# Patient Record
Sex: MALE | Race: WHITE | NOT HISPANIC OR LATINO | Employment: FULL TIME | URBAN - METROPOLITAN AREA
[De-identification: names, ages, dates, MRNs, and addresses within clinical notes are randomized per-mention and may not be internally consistent; named-entity substitution may affect disease eponyms.]

---

## 2017-01-05 ENCOUNTER — LAB CONVERSION - ENCOUNTER (OUTPATIENT)
Dept: OTHER | Facility: OTHER | Age: 57
End: 2017-01-05

## 2017-01-05 ENCOUNTER — GENERIC CONVERSION - ENCOUNTER (OUTPATIENT)
Dept: OTHER | Facility: OTHER | Age: 57
End: 2017-01-05

## 2017-01-05 LAB
INR PPP: 2.4
PROTHROMBIN TIME: 25 SEC (ref 9–11.5)

## 2017-02-02 ENCOUNTER — LAB CONVERSION - ENCOUNTER (OUTPATIENT)
Dept: OTHER | Facility: OTHER | Age: 57
End: 2017-02-02

## 2017-02-02 ENCOUNTER — GENERIC CONVERSION - ENCOUNTER (OUTPATIENT)
Dept: OTHER | Facility: OTHER | Age: 57
End: 2017-02-02

## 2017-02-02 LAB
INR PPP: 2.3
PROTHROMBIN TIME: 23.4 SEC (ref 9–11.5)

## 2017-03-02 ENCOUNTER — GENERIC CONVERSION - ENCOUNTER (OUTPATIENT)
Dept: OTHER | Facility: OTHER | Age: 57
End: 2017-03-02

## 2017-03-02 ENCOUNTER — LAB CONVERSION - ENCOUNTER (OUTPATIENT)
Dept: OTHER | Facility: OTHER | Age: 57
End: 2017-03-02

## 2017-03-02 LAB
INR PPP: 2.1
PROTHROMBIN TIME: 21 SEC (ref 9–11.5)

## 2017-04-04 ENCOUNTER — GENERIC CONVERSION - ENCOUNTER (OUTPATIENT)
Dept: OTHER | Facility: OTHER | Age: 57
End: 2017-04-04

## 2017-04-04 ENCOUNTER — LAB CONVERSION - ENCOUNTER (OUTPATIENT)
Dept: OTHER | Facility: OTHER | Age: 57
End: 2017-04-04

## 2017-04-04 LAB
INR PPP: 2.3
PROTHROMBIN TIME: 23.7 SEC (ref 9–11.5)

## 2017-05-02 ENCOUNTER — LAB CONVERSION - ENCOUNTER (OUTPATIENT)
Dept: OTHER | Facility: OTHER | Age: 57
End: 2017-05-02

## 2017-05-02 ENCOUNTER — GENERIC CONVERSION - ENCOUNTER (OUTPATIENT)
Dept: OTHER | Facility: OTHER | Age: 57
End: 2017-05-02

## 2017-05-02 LAB
INR PPP: 1.9
PROTHROMBIN TIME: 19.5 SEC (ref 9–11.5)

## 2017-05-16 ENCOUNTER — GENERIC CONVERSION - ENCOUNTER (OUTPATIENT)
Dept: OTHER | Facility: OTHER | Age: 57
End: 2017-05-16

## 2017-06-02 ENCOUNTER — GENERIC CONVERSION - ENCOUNTER (OUTPATIENT)
Dept: OTHER | Facility: OTHER | Age: 57
End: 2017-06-02

## 2017-06-02 ENCOUNTER — LAB CONVERSION - ENCOUNTER (OUTPATIENT)
Dept: OTHER | Facility: OTHER | Age: 57
End: 2017-06-02

## 2017-06-02 LAB
INR PPP: 1.9
PROTHROMBIN TIME: 19.8 SEC (ref 9–11.5)

## 2017-06-16 ENCOUNTER — ALLSCRIPTS OFFICE VISIT (OUTPATIENT)
Dept: OTHER | Facility: OTHER | Age: 57
End: 2017-06-16

## 2017-06-30 ENCOUNTER — LAB CONVERSION - ENCOUNTER (OUTPATIENT)
Dept: OTHER | Facility: OTHER | Age: 57
End: 2017-06-30

## 2017-06-30 LAB
INR PPP: 2.5
PROTHROMBIN TIME: 25.4 SEC (ref 9–11.5)

## 2017-07-06 ENCOUNTER — GENERIC CONVERSION - ENCOUNTER (OUTPATIENT)
Dept: OTHER | Facility: OTHER | Age: 57
End: 2017-07-06

## 2017-07-28 ENCOUNTER — GENERIC CONVERSION - ENCOUNTER (OUTPATIENT)
Dept: OTHER | Facility: OTHER | Age: 57
End: 2017-07-28

## 2017-07-28 ENCOUNTER — LAB CONVERSION - ENCOUNTER (OUTPATIENT)
Dept: OTHER | Facility: OTHER | Age: 57
End: 2017-07-28

## 2017-07-28 LAB
INR PPP: 2.2
PROTHROMBIN TIME: 22.3 SEC (ref 9–11.5)

## 2017-08-24 ENCOUNTER — GENERIC CONVERSION - ENCOUNTER (OUTPATIENT)
Dept: OTHER | Facility: OTHER | Age: 57
End: 2017-08-24

## 2017-08-25 ENCOUNTER — LAB CONVERSION - ENCOUNTER (OUTPATIENT)
Dept: OTHER | Facility: OTHER | Age: 57
End: 2017-08-25

## 2017-08-25 ENCOUNTER — GENERIC CONVERSION - ENCOUNTER (OUTPATIENT)
Dept: OTHER | Facility: OTHER | Age: 57
End: 2017-08-25

## 2017-08-25 LAB
INR PPP: 2.5
PROTHROMBIN TIME: 25.4 SEC (ref 9–11.5)

## 2017-08-29 ENCOUNTER — GENERIC CONVERSION - ENCOUNTER (OUTPATIENT)
Dept: OTHER | Facility: OTHER | Age: 57
End: 2017-08-29

## 2017-09-26 ENCOUNTER — LAB CONVERSION - ENCOUNTER (OUTPATIENT)
Dept: OTHER | Facility: OTHER | Age: 57
End: 2017-09-26

## 2017-09-26 ENCOUNTER — GENERIC CONVERSION - ENCOUNTER (OUTPATIENT)
Dept: OTHER | Facility: OTHER | Age: 57
End: 2017-09-26

## 2017-09-26 LAB
INR PPP: 2.2
PROTHROMBIN TIME: 22.8 SEC (ref 9–11.5)

## 2017-10-16 ENCOUNTER — GENERIC CONVERSION - ENCOUNTER (OUTPATIENT)
Dept: OTHER | Facility: OTHER | Age: 57
End: 2017-10-16

## 2017-10-16 DIAGNOSIS — D68.51 ACTIVATED PROTEIN C RESISTANCE (HCC): ICD-10-CM

## 2017-10-27 ENCOUNTER — GENERIC CONVERSION - ENCOUNTER (OUTPATIENT)
Dept: OTHER | Facility: OTHER | Age: 57
End: 2017-10-27

## 2017-10-27 LAB
INR PPP: 2.2
PROTHROMBIN TIME: 22.4 SEC (ref 9–11.5)

## 2017-11-29 ENCOUNTER — ALLSCRIPTS OFFICE VISIT (OUTPATIENT)
Dept: OTHER | Facility: OTHER | Age: 57
End: 2017-11-29

## 2017-11-29 DIAGNOSIS — R07.9 CHEST PAIN: ICD-10-CM

## 2017-11-29 DIAGNOSIS — I10 ESSENTIAL (PRIMARY) HYPERTENSION: ICD-10-CM

## 2017-11-29 DIAGNOSIS — R00.2 PALPITATIONS: ICD-10-CM

## 2017-11-29 DIAGNOSIS — M10.9 GOUT: ICD-10-CM

## 2017-12-02 ENCOUNTER — LAB CONVERSION - ENCOUNTER (OUTPATIENT)
Dept: OTHER | Facility: OTHER | Age: 57
End: 2017-12-02

## 2017-12-02 LAB
A/G RATIO (HISTORICAL): 2.3 (CALC) (ref 1–2.5)
ALBUMIN SERPL BCP-MCNC: 4.5 G/DL (ref 3.6–5.1)
ALP SERPL-CCNC: 53 U/L (ref 40–115)
ALT SERPL W P-5'-P-CCNC: 39 U/L (ref 9–46)
AST SERPL W P-5'-P-CCNC: 33 U/L (ref 10–35)
BASOPHILS # BLD AUTO: 0.8 %
BASOPHILS # BLD AUTO: 58 CELLS/UL (ref 0–200)
BILIRUB SERPL-MCNC: 0.9 MG/DL (ref 0.2–1.2)
BUN SERPL-MCNC: 9 MG/DL (ref 7–25)
BUN/CREA RATIO (HISTORICAL): NORMAL (CALC) (ref 6–22)
CALCIUM SERPL-MCNC: 9.4 MG/DL (ref 8.6–10.3)
CHLORIDE SERPL-SCNC: 103 MMOL/L (ref 98–110)
CHOLEST SERPL-MCNC: 201 MG/DL
CHOLEST/HDLC SERPL: 2.8 (CALC)
CO2 SERPL-SCNC: 27 MMOL/L (ref 20–31)
CREAT SERPL-MCNC: 0.84 MG/DL (ref 0.7–1.33)
DEPRECATED RDW RBC AUTO: 13.1 % (ref 11–15)
EGFR AFRICAN AMERICAN (HISTORICAL): 114 ML/MIN/1.73M2
EGFR-AMERICAN CALC (HISTORICAL): 99 ML/MIN/1.73M2
EOSINOPHIL # BLD AUTO: 285 CELLS/UL (ref 15–500)
EOSINOPHIL # BLD AUTO: 3.9 %
GAMMA GLOBULIN (HISTORICAL): 2 G/DL (CALC) (ref 1.9–3.7)
GLUCOSE (HISTORICAL): 85 MG/DL (ref 65–99)
HBA1C MFR BLD HPLC: 4.9 % OF TOTAL HGB
HCT VFR BLD AUTO: 48.1 % (ref 38.5–50)
HDLC SERPL-MCNC: 72 MG/DL
HGB BLD-MCNC: 17.3 G/DL (ref 13.2–17.1)
LDL CHOLESTEROL (HISTORICAL): 112 MG/DL (CALC)
LYMPHOCYTES # BLD AUTO: 2285 CELLS/UL (ref 850–3900)
LYMPHOCYTES # BLD AUTO: 31.3 %
MCH RBC QN AUTO: 34.5 PG (ref 27–33)
MCHC RBC AUTO-ENTMCNC: 36 G/DL (ref 32–36)
MCV RBC AUTO: 95.8 FL (ref 80–100)
MONOCYTES # BLD AUTO: 526 CELLS/UL (ref 200–950)
MONOCYTES (HISTORICAL): 7.2 %
NEUTROPHILS # BLD AUTO: 4146 CELLS/UL (ref 1500–7800)
NEUTROPHILS # BLD AUTO: 56.8 %
NON-HDL-CHOL (CHOL-HDL) (HISTORICAL): 129 MG/DL (CALC)
PLATELET # BLD AUTO: 128 THOUSAND/UL (ref 140–400)
PMV BLD AUTO: 10.2 FL (ref 7.5–12.5)
POTASSIUM SERPL-SCNC: 4.3 MMOL/L (ref 3.5–5.3)
RBC # BLD AUTO: 5.02 MILLION/UL (ref 4.2–5.8)
SODIUM SERPL-SCNC: 140 MMOL/L (ref 135–146)
TOTAL PROTEIN (HISTORICAL): 6.5 G/DL (ref 6.1–8.1)
TRIGL SERPL-MCNC: 78 MG/DL
TSH SERPL DL<=0.05 MIU/L-ACNC: 1.77 MIU/L (ref 0.4–4.5)
URIC ACID (HISTORICAL): 3.3 MG/DL (ref 4–8)
WBC # BLD AUTO: 7.3 THOUSAND/UL (ref 3.8–10.8)

## 2017-12-05 NOTE — PROGRESS NOTES
Assessment    1  Never smoked cigarettes (V49 89) (Z78 9)   2  Palpitations (785 1) (R00 2)   3  Encounter for preventive health examination (V70 0) (Z00 00)   4  Chest pain (786 50) (R07 9)   5  Elevated hemoglobin (282 7) (D58 2)   6  Esophageal reflux (530 81) (K21 9)   7  Hypertension (401 9) (I10)   8  Gout (274 9) (M10 9)    Plan  Chest pain    · RaNITidine HCl - 300 MG Oral Capsule; 1 Cap daily   · * NM MYOCARDIAL PERFUSION SPECT (STRESS AND/OR REST); Status:Need Information - Financial  Authorization; Requested for:29Nov2017;    · EKG/ECG- POC; Status:Complete;   Done: 59MGQ2045 10:19AM  Chest pain, EKG, abnormal, Hypertension, Palpitations    · *Polysomnography, Sleep Study, Diagnostic; Status:Need Information - Financial Authorization; Requested for:29Nov2017;   there any other medical conditions or medications that would explain these      symptoms? : No  is the sleep disturbance affecting the patient's ability to function? : DAYTIME      HYPERSOMNOLENCE, CHEST PAIN, ELEVATED BP  History/Symptoms: : POOR SLEEP, PALPITATIONS, WAKING UP CP  Study Only or Consult : Sleep Study and Consult and F/U with Sleep Specialist  Chest pain, Gout    · (1) LIPID PANEL, FASTING; Status:Active; Requested for:29Nov2017;   Chest pain, Hypertension    · Metoprolol Succinate ER 50 MG Oral Tablet Extended Release 24 Hour; 1 PO BID  Chest pain, Palpitations    · ECHO COMPLETE WITH CONTRAST IF INDICATED; Status:Need Information - Financial Authorization; Requested for:29Nov2017;   Gout    · (1) CBC/PLT/DIFF; Status:Active; Requested for:29Nov2017;    · (1) COMPREHENSIVE METABOLIC PANEL; Status:Active; Requested for:29Nov2017;    · (1) HEMOGLOBIN A1C; Status:Active; Requested for:29Nov2017;    · (1) URIC ACID; Status:Active; Requested for:29Nov2017;   Gout, Hypertension, Palpitations    · (1) TSH; Status:Active;  Requested for:29Nov2017;     Discussion/Summary  Discussion Summary:   REFER TO CARDIOLOGY- PT HAS SEEN DR ANDERSON- IF APPT WON'T BE FORE MORE THAN A MONTH WILL REFER FOR NUCLEAR STRES TEST, HOLTER TO RULE OUT ARRHYTHMIA AND SLEEP STUDY TO RULE OUT A FIB; INCREASE BETA BLOCKER TO 50 ER BID; FOLLOW BP  CHECK HOLTER  CONTINUE ON WARFARIN FOR HYPERCOAG STATE  ADD RANITIDINE FOR ANY GERD SYMPTOMS   FOLLOW UP  EKG NORMAL  CHECK LABS  Counseling Documentation With Imm: The patient was counseled regarding diagnostic results, instructions for management, risk factor reductions, prognosis, patient and family education, impressions, risks and benefits of treatment options, importance of compliance with treatment  Chief Complaint    1  Chest Pain  Chief Complaint Free Text Note Form: patient here for chest pain, palpitations and having more issues      History of Present Illness  Palpitations: LAURA MALONEY presents with complaints of palpitations  Associated symptoms include a fluttering heartbeat, a pounding heartbeat and chest pain, but no dizziness, no dyspnea, no lightheadedness, no syncope and no weakness  HPI: PT AWAKES WITH PALPITATIONS, FEELING LIKE PRESSURE IN HIS CHEST - AWAKNES - HE HAS BEEN TAKING ATIVAN AND HE TAKES A BETA BLOCKER= HE IS MAINTAINED ON COUMADIN FOR HYPERCOAG STATE; HE HAS SEEN DR ANDERSON IN THE PAST AND WOULD LIKE TO SEE HIM AGAIN;       Chest Pain:   LAURA MALONEY presents with complaints of sudden onset of moderate anterior mid-chest chest pain, described as pressure-like, non-radiating  Associated symptoms include no cough, no dyspnea, no leg edema, no orthopnea, no palpitations, no anxiety, no dizziness, no fatigue, no heartburn, no leg pain, no leg swelling, no nausea, no sweating, no syncope, no vomiting, no weakness and no weight gain  Review of Systems  Complete-Male:   Constitutional: feeling poorly and feeling tired, but as noted in HPI  Eyes: No complaints of eye pain, no red eyes, no discharge from eyes, no itchy eyes, no eyesight problems and no purulent discharge from the eyes  ENT: no complaints of earache, no hearing loss, no nosebleeds, no nasal discharge, no sore throat, no hoarseness, no earache and no hearing loss  Cardiovascular: chest pain, fast heart rate and palpitations, but as noted in HPI and the heart rate was not slow  Respiratory: No complaints of shortness of breath, no wheezing, no cough, no SOB on exertion, no orthopnea or PND, no shortness of breath, no cough, no orthopnea, no wheezing, no shortness of breath during exertion and no PND  Gastrointestinal: as noted in HPI, no abdominal pain, no nausea, no vomiting, no diarrhea and no blood in stools  Genitourinary: No complaints of dysuria, no incontinence, no hesitancy, no nocturia, no genital lesion, no testicular pain, no dysuria, no urinary hesitancy, no incontinence and no nocturia  Musculoskeletal: No complaints of arthralgia, no myalgias, no joint swelling or stiffness, no limb pain or swelling and no arthralgias  Integumentary: No complaints of skin rash or skin lesions, no itching, no skin wound, no dry skin and no rashes  Neurological: No compliants of headache, no confusion, no convulsions, no numbness or tingling, no dizziness or fainting, no limb weakness, no difficulty walking  Psychiatric: Is not suicidal, no sleep disturbances, no anxiety or depression, no change in personality, no emotional problems  Hematologic/Lymphatic: No complaints of swollen glands, no swollen glands in the neck, does not bleed easily, no easy bruising  ROS Reviewed:   ROS reviewed  Active Problems    1  Abdominal pain (789 00) (R10 9)   2  Acute left lower quadrant pain (789 04,338 19) (R10 32)   3  Chest mass (786 6) (R22 2)   4  Chest pain (786 50) (R07 9)   5  Diverticulitis of colon (562 11) (K57 32)   6  Edema (782 3) (R60 9)   7  EKG, abnormal (794 31) (R94 31)   8  Elbow pain (719 42) (M25 529)   9  Elevated hemoglobin (282 7) (D58 2)   10  Esophageal reflux (530 81) (K21 9)   11   Factor V Leiden mutation (289 81) (D68 51)   12  Fatty liver (571 8) (K76 0)   13  Gout (274 9) (M10 9)   14  Hypertension (401 9) (I10)   15  Thrombocytopenia (287 5) (D69 6)   16  Umbilical hernia (125 9) (K42 9)   17  Ventral hernia (553 20) (K43 9)    Past Medical History    1  History of Abnormal Liver Function Test (790 6)   2  History of Foot Pain (Soft Tissue) (729 5)   3  History of acute gouty arthritis (V13 4) (Z87 39)   4  History of Polyp of sigmoid colon (211 3) (D12 5)  Active Problems And Past Medical History Reviewed: The active problems and past medical history were reviewed and updated today  Surgical History    1  History of Umbilical Hernia Repair  Surgical History Reviewed: The surgical history was reviewed and updated today  Family History  Mother    1  Family history of Breast Cancer (V16 3)   2  Family history of Hypertension (V17 49)   3  Family history of Lung cancer  Father    4  Family history of Coronary Artery Disease (V17 49)   5  Family history of Hypertension (V17 49)  Sister    10  Family history of Hypertension (V17 49)   7  Family history of Leukemia (V16 6)  Family History Reviewed: The family history was reviewed and updated today  Social History    · Moderate alcohol use   · Never smoked cigarettes (V49 89) (Z78 9)  Social History Reviewed: The social history was reviewed and updated today  The social history was reviewed and is unchanged  Current Meds   1  Allopurinol 300 MG Oral Tablet; TAKE 1 TABLET DAILY; Therapy: 03EON2634 to (Evaluate:72Ahm7367)  Requested for: 88Yrl2806; Last Rx:40Tmq0350   Ordered   2  Lisinopril 20 MG Oral Tablet; TAKE 1 TABLET DAILY  Requested for: 30RSP5922; Last MI:02NSO1303   Ordered   3  Warfarin Sodium 4 MG Oral Tablet; take one table 4  days per week; Therapy: 04DXU7226 to ([facility])  Requested for: 87YBA6794 Recorded   4  Warfarin Sodium 5 MG Oral Tablet; take one tablet 3 days per week(T,R,SAT);    Therapy: 74LAB4766 to ([phone])  Requested for: 09YSS5008 Recorded  Medication List Reviewed: The medication list was reviewed and updated today  Allergies    1  No Known Drug Allergies    Vitals  Vital Signs    Recorded: 17IID3666 10:08AM   Temperature 98 7 F, Tympanic   Heart Rate 76   Respiration 16   Systolic 715, LUE, Sitting   Diastolic 94, LUE, Sitting   Height 5 ft 8 5 in   Weight 196 lb 8 oz   BMI Calculated 29 44   BSA Calculated 2 04     Physical Exam    Constitutional   General appearance: No acute distress, well appearing and well nourished  WDWN MALE- ANXIOUS  Eyes   Conjunctiva and lids: No swelling, erythema, or discharge  Pupils and irises: Equal, round and reactive to light  Ears, Nose, Mouth, and Throat   Nasal mucosa, septum, and turbinates: Normal without edema or erythema  Oropharynx: Normal with no erythema, edema, exudate or lesions  Pulmonary   Respiratory effort: No increased work of breathing or signs of respiratory distress  Auscultation of lungs: Clear to auscultation, equal breath sounds bilaterally, no wheezes, no rales, no rhonci  Auscultation of the lungs revealed no expiratory wheezing, normal expiratory time and no inspiratory wheezing  no rales or crackles were heard bilaterally  no rhonchi  no friction rub  no wheezing  no diminished breath sounds  no bronchial breath sounds  Cardiovascular   Palpation of heart: Normal PMI, no thrills  RRR, SOME PAIN LEFT PECTORAL IS MUSCLE ON PALPATION  Auscultation of heart: Normal rate and rhythm, normal S1 and S2, without murmurs  The heart rate was normal  The rhythm was regular  Heart sounds: normal S1, normal S2, no S3 and no S4  no murmurs were heard  Abdomen   Abdomen: Non-tender, no masses  NORMAL BS; NO CVA TENDENRESS  Liver and spleen: No hepatomegaly or splenomegaly  NO ORGANOMEGALY  Lymphatic   Palpation of lymph nodes in neck: No lymphadenopathy      Musculoskeletal   Gait and station: Normal  Neurologic   Cranial nerves: Cranial nerves 2-12 intact  Reflexes: 2+ and symmetric  Sensation: No sensory loss  Psychiatric   Orientation to person, place and time: Normal     Mood and affect: Normal          Results/Data  EKG/ECG- POC [id number] 10:19AM Amber Lozada     Test Name Result Flag Reference   EKG/ECG 11/29/2017       PHQ-2 Adult Depression Screening 29Nov2017 10:14AM User, Ahs     Test Name Result Flag Reference   PHQ-2 Adult Depression Score 0     Over the last two weeks, how often have you been bothered by any of the following problems? Little interest or pleasure in doing things: Not at all - 0  Feeling down, depressed, or hopeless: Not at all - 0   PHQ-2 Adult Depression Screening Negative       Coumadin Flow Sheet 07ABQ5387 12:00AM      Test Name Result Flag Reference   INR 2 4     Recheck INR 86Xmf4801     Current Dose      5mgs tues and Thurs, 4mgs other 5 days   New Dose same dose     Patient Notified yes       ECG: A 12 lead ECG was performed and was normal  No acute ischemia  Rhythm and rate: normal sinus rhythm  P-waves: the P wave is normal    QRS: the QRS is normal       Health Management  History of Screen for colon cancer   COLONOSCOPY; every 5 years; Last 63PDN2881; Next Due: [id number]; Active    Signatures   Electronically signed by :  Darby Olea DO; Nov 30 2017  7:55AM EST                       (Author)

## 2017-12-06 ENCOUNTER — GENERIC CONVERSION - ENCOUNTER (OUTPATIENT)
Dept: OTHER | Facility: OTHER | Age: 57
End: 2017-12-06

## 2017-12-11 ENCOUNTER — GENERIC CONVERSION - ENCOUNTER (OUTPATIENT)
Dept: FAMILY MEDICINE CLINIC | Facility: CLINIC | Age: 57
End: 2017-12-11

## 2017-12-12 ENCOUNTER — GENERIC CONVERSION - ENCOUNTER (OUTPATIENT)
Dept: FAMILY MEDICINE CLINIC | Facility: CLINIC | Age: 57
End: 2017-12-12

## 2017-12-28 ENCOUNTER — GENERIC CONVERSION - ENCOUNTER (OUTPATIENT)
Dept: OTHER | Facility: OTHER | Age: 57
End: 2017-12-28

## 2018-01-04 ENCOUNTER — GENERIC CONVERSION - ENCOUNTER (OUTPATIENT)
Dept: FAMILY MEDICINE CLINIC | Facility: CLINIC | Age: 58
End: 2018-01-04

## 2018-01-09 NOTE — RESULT NOTES
Verified Results  (1) PT WITH INR [id number] 06:32AM Rosette Ventura     Test Name Result Flag Reference   INR 2 3 H    Reference Range                     0 9-1 1  Moderate-intensity Warfarin Therapy 2 0-3 0  Higher-intensity Warfarin Therapy   3 0-4 0   PT 24 1 sec H 9 0-11 5   For more information on this test, go to:  [email or web address]

## 2018-01-10 NOTE — RESULT NOTES
Verified Results  (1) PT WITH INR [id number] 09:10AM Martin Montilla     Test Name Result Flag Reference   INR 2 2 H    Reference Range                     0 9-1 1  Moderate-intensity Warfarin Therapy 2 0-3 0  Higher-intensity Warfarin Therapy   3 0-4 0   PT 22 4 sec H 9 0-11 5   For more information on this test, go to:  [email or web address]

## 2018-01-10 NOTE — RESULT NOTES
Verified Results  (1) PT WITH INR [id number] 07:15AM Shailesh Breaker     Test Name Result Flag Reference   INR 2 0 H    Reference Range                     0 9-1 1  Moderate-intensity Warfarin Therapy 2 0-3 0  Higher-intensity Warfarin Therapy   3 0-4 0   PT 20 2 sec H 9 0-11 5   For more information on this test, go to:  [email or web address]

## 2018-01-10 NOTE — RESULT NOTES
Verified Results  (1) PT WITH INR [id number] 07:17AM Mor Almonte   REPORT COMMENT:  FASTING:NO     Test Name Result Flag Reference   INR 2 6 H    Reference Range                     0 9-1 1  Moderate-intensity Warfarin Therapy 2 0-3 0  Higher-intensity Warfarin Therapy   3 0-4 0   PT 26 8 sec H 9 0-11 5   For more information on this test, go to:  http://[email or web address]/TZA556

## 2018-01-10 NOTE — RESULT NOTES
Verified Results  (1) PT WITH INR [id number] 06:33AM Peyman Eye     Test Name Result Flag Reference   INR 2 4 H    Reference Range                     0 9-1 1  Moderate-intensity Warfarin Therapy 2 0-3 0  Higher-intensity Warfarin Therapy   3 0-4 0   PT 24 3 sec H 9 0-11 5   For more information on this test, go to:  [email or web address]

## 2018-01-10 NOTE — RESULT NOTES
Verified Results  (1) PT WITH INR [id number] 07:15AM Melva Booth   REPORT COMMENT:  FASTING:NO     Test Name Result Flag Reference   INR 2 3 H    Reference Range                     0 9-1 1  Moderate-intensity Warfarin Therapy 2 0-3 0  Higher-intensity Warfarin Therapy   3 0-4 0   PT 24 0 sec H 9 0-11 5   For more information on this test, go to:  [email or web address]       Signatures   Electronically signed by : YAMILETH Callahan ; Jul  3 2016  9:10AM EST                       (Author)

## 2018-01-10 NOTE — RESULT NOTES
Message   PSA in normal range  Verified Results  (1) PSA (SCREEN) (Dx V76 44 Screen for Prostate Cancer) [id number] 07:17AM Shailesh Breaker   REPORT COMMENT:  FASTING:NO     Test Name Result Flag Reference   PSA, TOTAL 2 8 ng/mL  < OR = 4 0   This test was performed using the Siemens  chemiluminescent method  Values obtained from  different assay methods cannot be used  interchangeably  PSA levels, regardless of  value, should not be interpreted as absolute  evidence of the presence or absence of disease

## 2018-01-10 NOTE — RESULT NOTES
Verified Results  (1) PT WITH INR [id number] 06:44AM José Miguel Magaña   REPORT COMMENT:  FASTING:NO     Test Name Result Flag Reference   INR 2 3 H    Reference Range                     0 9-1 1  Moderate-intensity Warfarin Therapy 2 0-3 0  Higher-intensity Warfarin Therapy   3 0-4 0   PT 23 7 sec H 9 0-11 5   For more information on this test, go to:  [email or web address]

## 2018-01-10 NOTE — RESULT NOTES
Verified Results  (1) PT WITH INR [id number] 07:00AM Bishop Valle     Test Name Result Flag Reference   INR 2 1 H    Reference Range                     0 9-1 1  Moderate-intensity Warfarin Therapy 2 0-3 0  Higher-intensity Warfarin Therapy   3 0-4 0   PT 21 1 sec H 9 0-11 5   For more information on this test, go to:  [email or web address]

## 2018-01-11 NOTE — RESULT NOTES
Verified Results  (1) PT WITH INR [id number] 06:46AM Ashley Montgomery   REPORT COMMENT:  FASTING:NO     Test Name Result Flag Reference   INR 1 9 H    Reference Range                     0 9-1 1  Moderate-intensity Warfarin Therapy 2 0-3 0  Higher-intensity Warfarin Therapy   3 0-4 0   PT 19 5 sec H 9 0-11 5   For more information on this test, go to:  [email or web address]

## 2018-01-11 NOTE — RESULT NOTES
Verified Results  (1) PT WITH INR [id number] 06:43AM Chantel Reyes     Test Name Result Flag Reference   INR 3 0 H    Reference Range                     0 9-1 1  Moderate-intensity Warfarin Therapy 2 0-3 0  Higher-intensity Warfarin Therapy   3 0-4 0   PT 31 1 sec H 9 0-11 5   For more information on this test, go to:  [email or web address]

## 2018-01-11 NOTE — RESULT NOTES
Verified Results  (1) PT WITH INR [id number] 06:39AM Denice Quintanilla     Test Name Result Flag Reference   INR 2 5 H    Reference Range                     0 9-1 1  Moderate-intensity Warfarin Therapy 2 0-3 0  Higher-intensity Warfarin Therapy   3 0-4 0   PT 25 4 sec H 9 0-11 5   For more information on this test, go to:  [email or web address]/[email or web address]

## 2018-01-11 NOTE — RESULT NOTES
Verified Results  (1) PT WITH INR [id number] 06:33AM Viviane Schwartz     Test Name Result Flag Reference   INR 2 3 H    Reference Range                     0 9-1 1  Moderate-intensity Warfarin Therapy 2 0-3 0  Higher-intensity Warfarin Therapy   3 0-4 0   PT 24 1 sec H 9 0-11 5   For more information on this test, go to:  [email or web address]

## 2018-01-12 NOTE — RESULT NOTES
Verified Results  (1) PT WITH INR [id number] 06:44AM Amber Lozada     Test Name Result Flag Reference   INR 2 3 H    Reference Range                     0 9-1 1  Moderate-intensity Warfarin Therapy 2 0-3 0  Higher-intensity Warfarin Therapy   3 0-4 0   PT 23 4 sec H 9 0-11 5   For more information on this test, go to:  [email or web address]

## 2018-01-12 NOTE — RESULT NOTES
Verified Results  (1) PT WITH INR [id number] 06:44AM Mor Almonte     Test Name Result Flag Reference   INR 2 5 H    Reference Range                     0 9-1 1  Moderate-intensity Warfarin Therapy 2 0-3 0  Higher-intensity Warfarin Therapy   3 0-4 0   PT 25 4 sec H 9 0-11 5   For more information on this test, go to:  [email or web address]

## 2018-01-13 VITALS
HEART RATE: 76 BPM | BODY MASS INDEX: 29.1 KG/M2 | WEIGHT: 196.5 LBS | HEIGHT: 69 IN | SYSTOLIC BLOOD PRESSURE: 146 MMHG | TEMPERATURE: 98.7 F | RESPIRATION RATE: 16 BRPM | DIASTOLIC BLOOD PRESSURE: 94 MMHG

## 2018-01-13 VITALS
SYSTOLIC BLOOD PRESSURE: 102 MMHG | BODY MASS INDEX: 29.06 KG/M2 | HEART RATE: 70 BPM | RESPIRATION RATE: 16 BRPM | TEMPERATURE: 98 F | HEIGHT: 69 IN | WEIGHT: 196.2 LBS | DIASTOLIC BLOOD PRESSURE: 64 MMHG

## 2018-01-14 NOTE — RESULT NOTES
Verified Results  (1) PT WITH INR [id number] 06:46AM Bishop Valle   REPORT COMMENT:  FASTING:NO     Test Name Result Flag Reference   INR 2 4 H    Reference Range                     0 9-1 1  Moderate-intensity Warfarin Therapy 2 0-3 0  Higher-intensity Warfarin Therapy   3 0-4 0   PT 25 0 sec H 9 0-11 5   For more information on this test, go to:  [email or web address]

## 2018-01-15 NOTE — RESULT NOTES
Verified Results  (1) PT WITH INR [id number] 07:11AM Shailesh Breaker   REPORT COMMENT:  FASTING:YES     Test Name Result Flag Reference   INR 2 5 H    Reference Range                     0 9-1 1  Moderate-intensity Warfarin Therapy 2 0-3 0  Higher-intensity Warfarin Therapy   3 0-4 0   PT 25 8 sec H 9 0-11 5   For more information on this test, go to:  [email or web address]

## 2018-01-15 NOTE — RESULT NOTES
Verified Results  *US BREAST RIGHT LIMITED (DIAGNOSTIC) 13Oct2016 11:14AM [facility]     Test Name Result Flag Reference   US BREAST RIGHT LIMITED (Report)     Patient History:   Family history of breast cancer in mother  Reason for exam: clinical finding  Mammo Diagnostic Bilateral W CAD: October 13, 2016 - Check In #:    [de-identified]   Bilateral CC and MLO view(s) were taken  Technologist: WARREN Basilio (R)(M)   The breast tissue is almost entirely fat  These images were    obtained using digital technique and with the assistance of    Computer Aided Detection  There are no dominant masses, foci of    architectural distortion or suspicious clusters of calcification    to suggest malignancy  The visualized skin appears normal      Targeted ultrasound over the formerly palpable abnormality in the   left breast demonstrates no evidence of hypoechoic mass or    architectural distortion to suggest malignancy  Other probable    lymph nodes are identified bilaterally correlating with    mammographic findings  US Breast Right Limited: October 13, 2016 - Check In #: [de-identified]   Technologist: Shayne Aguila RDMS     US Breast Left Limited: October 13, 2016 - Check In #: [de-identified]   Technologist: Shayne Aguila RDMS     ASSESSMENT: BiRad:2 - Benign (Overall)     Recommendation:   Clinical management of the left breast    Analyzed by CAD     Transcription Location: [address]   Signing Station: [id number]     Risk Value(s):   Myriad Table: 1 5%   Signed by:   Marga Soulier, MD   10/13/16     *US BREAST LEFT LIMITED (DIAGNOSTIC) [id number] 10:48AM [facility]   TW Order Number: [id number]    - Patient Instructions: To schedule this appointment, please contact Central Scheduling at [phone]  TW Order Number: [id number]    - Patient Instructions: To schedule this appointment, please contact Central Scheduling at [phone]       Test Name Result Flag Reference   US BREAST LEFT LIMITED (Report) Patient History:   Family history of breast cancer in mother  Reason for exam: clinical finding  Mammo Diagnostic Bilateral W CAD: October 13, 2016 - Check In #:    [de-identified]   Bilateral CC and MLO view(s) were taken  Technologist: WARREN Maria T (R)(M)   The breast tissue is almost entirely fat  These images were    obtained using digital technique and with the assistance of    Computer Aided Detection  There are no dominant masses, foci of    architectural distortion or suspicious clusters of calcification    to suggest malignancy  The visualized skin appears normal      Targeted ultrasound over the formerly palpable abnormality in the   left breast demonstrates no evidence of hypoechoic mass or    architectural distortion to suggest malignancy  Other probable    lymph nodes are identified bilaterally correlating with    mammographic findings  US Breast Right Limited: October 13, 2016 - Check In #: [de-identified]   Technologist: Jackie Moya RDMS     US Breast Left Limited: October 13, 2016 - Check In #: [de-identified]   Technologist: Jackie Moya RDMS     ASSESSMENT: BiRad:2 - Benign (Overall)     Recommendation:   Clinical management of the left breast    Analyzed by CAD     Transcription Location: [address]   Signing Station: [id number]     Risk Value(s):   Myriad Table: 1 5%   Signed by:   Fabi Jackson MD   10/13/16     MAMMO DIAGNOSTIC BILATERAL W CAD [id number] 10:48AM Sidonie Matter   TW Order Number: [id number]    - Patient Instructions: To schedule this appointment, please contact Central Scheduling at [phone]  Do not wear any perfume, powder, lotion or deodorant on breast or underarm area  Please bring your doctors order, referral (if needed) and insurance information with you on the day of the test  Failure to bring this information may result in this test being rescheduled  Arrive 15 minutes prior to your appointment time to register      On the day of your test, please bring any prior mammogram or breast studies with you that were not performed at a [facility]  Failure to bring prior exams may result in your test needing to be rescheduled  TW Order Number: [id number]    - Patient Instructions: To schedule this appointment, please contact Central Scheduling at [phone]  Do not wear any perfume, powder, lotion or deodorant on breast or underarm area  Please bring your doctors order, referral (if needed) and insurance information with you on the day of the test  Failure to bring this information may result in this test being rescheduled  Arrive 15 minutes prior to your appointment time to register  On the day of your test, please bring any prior mammogram or breast studies with you that were not performed at a [facility]  Failure to bring prior exams may result in your test needing to be rescheduled  Test Name Result Flag Reference   MAMMO DIAGNOSTIC BILATERAL W CAD (Report)     Patient History:   Family history of breast cancer in mother  Reason for exam: clinical finding  Mammo Diagnostic Bilateral W CAD: October 13, 2016 - Check In #:    [de-identified]   Bilateral CC and MLO view(s) were taken  Technologist: WRAREN Johnson (WARREN)(M)   The breast tissue is almost entirely fat  These images were    obtained using digital technique and with the assistance of    Computer Aided Detection  There are no dominant masses, foci of    architectural distortion or suspicious clusters of calcification    to suggest malignancy  The visualized skin appears normal      Targeted ultrasound over the formerly palpable abnormality in the   left breast demonstrates no evidence of hypoechoic mass or    architectural distortion to suggest malignancy  Other probable    lymph nodes are identified bilaterally correlating with    mammographic findings       US Breast Right Limited: October 13, 2016 - Check In #: [de-identified]   Technologist: Aure Richter RDMS     US Breast Left Limited: October 13, 2016 - Check In #: [de-identified]   Technologist: Venecia Milner RDMS     ASSESSMENT: BiRad:2 - Benign (Overall)     Recommendation:   Clinical management of the left breast    Analyzed by CAD     Transcription Location: [address]   Signing Station: [id number]     Risk Value(s):   Myriad Table: 1 5%   Signed by:   Brenden Barnes MD   10/13/16

## 2018-01-16 NOTE — RESULT NOTES
Verified Results  (1) PT WITH INR [id number] 06:46AM Mor Almonte   REPORT COMMENT:  FASTING:YES     Test Name Result Flag Reference   INR 3 2 H    Reference Range                     0 9-1 1  Moderate-intensity Warfarin Therapy 2 0-3 0  Higher-intensity Warfarin Therapy   3 0-4 0   PT 33 3 sec H 9 0-11 5   For more information on this test, go to:  http://[employer]/faq/JOF968

## 2018-01-16 NOTE — RESULT NOTES
Verified Results  (1) PT WITH INR [id number] 06:30AM Christel Bailey   REPORT COMMENT:  FASTING:NO     Test Name Result Flag Reference   INR 2 5 H    Reference Range                     0 9-1 1  Moderate-intensity Warfarin Therapy 2 0-3 0  Higher-intensity Warfarin Therapy   3 0-4 0   PT 26 2 sec H 9 0-11 5   For more information on this test, go to:  http://[employer]/faq/IRE690

## 2018-01-16 NOTE — RESULT NOTES
Verified Results  (1) PT WITH INR [id number] 06:52AM Peyman Eye   REPORT COMMENT:  FASTING:NO     Test Name Result Flag Reference   INR 2 1 H    Reference Range                     0 9-1 1  Moderate-intensity Warfarin Therapy 2 0-3 0  Higher-intensity Warfarin Therapy   3 0-4 0   PT 21 0 sec H 9 0-11 5   For more information on this test, go to:  http://[employer]/faq/CYV849

## 2018-01-17 NOTE — RESULT NOTES
Verified Results  (1) PT WITH INR [id number] 06:49AM Germán Valadez     Test Name Result Flag Reference   INR 2 2 H    Reference Range                     0 9-1 1  Moderate-intensity Warfarin Therapy 2 0-3 0  Higher-intensity Warfarin Therapy   3 0-4 0   PT 22 8 sec H 9 0-11 5   For more information on this test, go to:  http://[employer]/faq/HCN055

## 2018-01-17 NOTE — RESULT NOTES
Verified Results  (1) PT WITH INR [id number] 06:44AM Denice Quintanilla     Test Name Result Flag Reference   INR 2 2 H    Reference Range                     0 9-1 1  Moderate-intensity Warfarin Therapy 2 0-3 0  Higher-intensity Warfarin Therapy   3 0-4 0   PT 22 3 sec H 9 0-11 5   For more information on this test, go to:  http://[employer]/faq/GFA935

## 2018-01-17 NOTE — RESULT NOTES
Verified Results  (1) PT WITH INR [id number] 06:45AM Félix Debra     Test Name Result Flag Reference   INR 1 9 H    Reference Range                     0 9-1 1  Moderate-intensity Warfarin Therapy 2 0-3 0  Higher-intensity Warfarin Therapy   3 0-4 0   PT 19 5 sec H 9 0-11 5   For more information on this test, go to:  http://[employer]/faq/KOL198

## 2018-01-18 NOTE — RESULT NOTES
Verified Results  (1) PT WITH INR [id number] 06:44AM Morenita Khan     Test Name Result Flag Reference   INR 2 5 H    Reference Range                     0 9-1 1  Moderate-intensity Warfarin Therapy 2 0-3 0  Higher-intensity Warfarin Therapy   3 0-4 0   PT 26 2 sec H 9 0-11 5   For more information on this test, go to:  http://[employer]/faq/IXL538

## 2018-01-19 ENCOUNTER — LAB CONVERSION - ENCOUNTER (OUTPATIENT)
Dept: OTHER | Facility: OTHER | Age: 58
End: 2018-01-19

## 2018-01-19 LAB
INR PPP: 2.6
INR PPP: 2.6 (ref 0.86–1.16)
INR PPP: 2.6 (ref 0.86–1.16)
PROTHROMBIN TIME: 26.7 SEC (ref 9–11.5)

## 2018-01-22 ENCOUNTER — GENERIC CONVERSION - ENCOUNTER (OUTPATIENT)
Dept: OTHER | Facility: OTHER | Age: 58
End: 2018-01-22

## 2018-01-23 NOTE — RESULT NOTES
Discussion/Summary   Labs are all very good  Verified Results  (1) CBC/PLT/DIFF [id number] 10:06AM Elvan Schirmer     Test Name Result Flag Reference   WHITE BLOOD CELL COUNT 7 3 Thousand/uL  3 8-10 8   RED BLOOD CELL COUNT 5 02 Million/uL  4 20-5 80   HEMOGLOBIN 17 3 g/dL H 13 2-17 1   HEMATOCRIT 48 1 %  38 5-50 0   MCV 95 8 fL  80 0-100 0   MCH 34 5 pg H 27 0-33 0   MCHC 36 0 g/dL  32 0-36 0   RDW 13 1 %  11 0-15 0   PLATELET COUNT 529 Thousand/uL L 140-400   ABSOLUTE NEUTROPHILS 4146 cells/uL  3868-1003   ABSOLUTE LYMPHOCYTES 2285 cells/uL  850-3900   ABSOLUTE MONOCYTES 526 cells/uL  200-950   ABSOLUTE EOSINOPHILS 285 cells/uL     ABSOLUTE BASOPHILS 58 cells/uL  0-200   NEUTROPHILS 56 8 %     LYMPHOCYTES 31 3 %     MONOCYTES 7 2 %     EOSINOPHILS 3 9 %     BASOPHILS 0 8 %     MPV 10 2 fL  7 5-12 5     (1) COMPREHENSIVE METABOLIC PANEL [id number] 45:69ZH Elvan Schirmer     Test Name Result Flag Reference   GLUCOSE 85 mg/dL  65-99   Fasting reference interval   UREA NITROGEN (BUN) 9 mg/dL  7-25   CREATININE 0 84 mg/dL  0 70-1 33   For patients >52years of age, the reference limit  for Creatinine is approximately 13% higher for people  identified as -American  eGFR NON-AFR   AMERICAN 99 mL/min/1 73m2  > OR = 60   eGFR AFRICAN AMERICAN 114 mL/min/1 73m2  > OR = 60   BUN/CREATININE RATIO   5-57   NOT APPLICABLE (calc)   SODIUM 140 mmol/L  135-146   POTASSIUM 4 3 mmol/L  3 5-5 3   CHLORIDE 103 mmol/L     CARBON DIOXIDE 27 mmol/L  20-31   CALCIUM 9 4 mg/dL  8 6-10 3   PROTEIN, TOTAL 6 5 g/dL  6 1-8 1   ALBUMIN 4 5 g/dL  3 6-5 1   GLOBULIN 2 0 g/dL (calc)  1 9-3 7   ALBUMIN/GLOBULIN RATIO 2 3 (calc)  1 0-2 5   BILIRUBIN, TOTAL 0 9 mg/dL  0 2-1 2   ALKALINE PHOSPHATASE 53 U/L     AST 33 U/L  10-35   ALT 39 U/L  9-46     (1) LIPID PANEL, FASTING [id number] 10:06AM Elvan Schirmer     Test Name Result Flag Reference   CHOLESTEROL, TOTAL 201 mg/dL H <200   HDL CHOLESTEROL 72 mg/dL  >36   TRIGLICERIDES 78 mg/dL  <826   LDL-CHOLESTEROL 112 mg/dL (calc) H    Reference range: <100     Desirable range <100 mg/dL for patients with CHD or  diabetes and <70 mg/dL for diabetic patients with  known heart disease  LDL-C is now calculated using the Juan Luis-Storm   calculation, which is a validated novel method providing   better accuracy than the Friedewald equation in the   estimation of LDL-C  Ita Burgess  [facility]  4388;659(17): [phone]   (http://[employer]/faq/KMJ545)   CHOL/HDLC RATIO 2 8 (calc)  <5 0   NON HDL CHOLESTEROL 129 mg/dL (calc)  <130   For patients with diabetes plus 1 major ASCVD risk   factor, treating to a non-HDL-C goal of <100 mg/dL   (LDL-C of <70 mg/dL) is considered a therapeutic   option      (1) URIC ACID [id number] 10:06AM Amparo Conway     Test Name Result Flag Reference   URIC ACID 3 3 mg/dL L 4 0-8 0   Therapeutic target for gout patients: <6 0 mg/dL     (Q) TSH, 3RD GENERATION [id number] 10:06AM Amparo Conway     Test Name Result Flag Reference   TSH 1 77 mIU/L  0 40-4 50     (Q) HEMOGLOBIN A1c [id number] 10:06AM Amparo Conway   REPORT COMMENT:  FASTING:YES     Test Name Result Flag Reference   HEMOGLOBIN A1c 4 9 % of total Hgb  <5 7   For the purpose of screening for the presence of  diabetes:     <5 7%       Consistent with the absence of diabetes  5 7-6 4%    Consistent with increased risk for diabetes              (prediabetes)  > or =6 5%  Consistent with diabetes     This assay result is consistent with a decreased risk  of diabetes  Currently, no consensus exists regarding use of  hemoglobin A1c for diagnosis of diabetes in children  According to American Diabetes Association (ADA)  guidelines, hemoglobin A1c <7 0% represents optimal  control in non-pregnant diabetic patients  Different  metrics may apply to specific patient populations  Standards of Medical Care in Diabetes(ADA)

## 2018-01-24 NOTE — RESULT NOTES
Verified Results  (1) PT WITH INR [id number] 06:44AM Clemente Zeng     Test Name Result Flag Reference   INR 2 6 H    Reference Range                     0 9-1 1  Moderate-intensity Warfarin Therapy 2 0-3 0  Higher-intensity Warfarin Therapy   3 0-4 0   PT 26 7 sec H 9 0-11 5   For more information on this test, go to:  http://[employer]/faq/ZMW372

## 2018-01-26 DIAGNOSIS — I10 ESSENTIAL (PRIMARY) HYPERTENSION: Primary | ICD-10-CM

## 2018-01-26 RX ORDER — METOPROLOL SUCCINATE 50 MG/1
TABLET, EXTENDED RELEASE ORAL
Qty: 60 TABLET | Refills: 1 | Status: SHIPPED | OUTPATIENT
Start: 2018-01-26 | End: 2018-01-29 | Stop reason: SDUPTHER

## 2018-01-29 DIAGNOSIS — I10 ESSENTIAL (PRIMARY) HYPERTENSION: ICD-10-CM

## 2018-01-29 RX ORDER — METOPROLOL SUCCINATE 50 MG/1
TABLET, EXTENDED RELEASE ORAL
Qty: 60 TABLET | Refills: 1 | Status: SHIPPED | OUTPATIENT
Start: 2018-01-29 | End: 2018-02-09 | Stop reason: SDUPTHER

## 2018-01-30 ENCOUNTER — TELEPHONE (OUTPATIENT)
Dept: FAMILY MEDICINE CLINIC | Facility: CLINIC | Age: 58
End: 2018-01-30

## 2018-02-09 DIAGNOSIS — I10 ESSENTIAL (PRIMARY) HYPERTENSION: ICD-10-CM

## 2018-02-09 RX ORDER — METOPROLOL SUCCINATE 50 MG/1
TABLET, EXTENDED RELEASE ORAL
Qty: 60 TABLET | Refills: 1 | Status: SHIPPED | OUTPATIENT
Start: 2018-02-09 | End: 2018-12-31 | Stop reason: SDUPTHER

## 2018-02-12 ENCOUNTER — ANTICOAG VISIT (OUTPATIENT)
Dept: FAMILY MEDICINE CLINIC | Facility: CLINIC | Age: 58
End: 2018-02-12

## 2018-02-19 ENCOUNTER — ANTICOAG VISIT (OUTPATIENT)
Dept: FAMILY MEDICINE CLINIC | Facility: CLINIC | Age: 58
End: 2018-02-19

## 2018-02-19 ENCOUNTER — TELEPHONE (OUTPATIENT)
Dept: FAMILY MEDICINE CLINIC | Facility: CLINIC | Age: 58
End: 2018-02-19

## 2018-02-19 LAB
INR PPP: 2.8
PROTHROMBIN TIME: 28.1 SEC (ref 9–11.5)

## 2018-03-23 ENCOUNTER — ANTICOAG VISIT (OUTPATIENT)
Dept: FAMILY MEDICINE CLINIC | Facility: CLINIC | Age: 58
End: 2018-03-23

## 2018-03-23 ENCOUNTER — TELEPHONE (OUTPATIENT)
Dept: FAMILY MEDICINE CLINIC | Facility: CLINIC | Age: 58
End: 2018-03-23

## 2018-03-23 DIAGNOSIS — Z79.01 CHRONIC ANTICOAGULATION: Primary | ICD-10-CM

## 2018-03-23 LAB
INR PPP: 4.4
PROTHROMBIN TIME: 44.8 SEC (ref 9–11.5)

## 2018-03-23 NOTE — TELEPHONE ENCOUNTER
Patient called back for his INR number  Can we send him a new INR lab slip   His expires after next draw

## 2018-03-23 NOTE — TELEPHONE ENCOUNTER
Please call the patient have him hold his Coumadin for 2 days  Have him recheck an INR in 1 week  If he is having signs or symptoms of bleeding right now that he has to go to the Er, if not advise him to give call if he does develop any symptoms

## 2018-03-23 NOTE — TELEPHONE ENCOUNTER
Left message for patient to return call on vm  Was able to speak with wife Omar Cartagena at [facility] and advised per [facility]' s written instructions ( Dr Yue Omalley aware) hold 3/23, 3/24 and to start 4mg daily on 3/25/18  Protime 3/29  Wife stated understanding and will relay information to

## 2018-03-29 ENCOUNTER — ANTICOAG VISIT (OUTPATIENT)
Dept: FAMILY MEDICINE CLINIC | Facility: CLINIC | Age: 58
End: 2018-03-29

## 2018-03-29 ENCOUNTER — TELEPHONE (OUTPATIENT)
Dept: FAMILY MEDICINE CLINIC | Facility: CLINIC | Age: 58
End: 2018-03-29

## 2018-03-29 LAB
INR PPP: 1.9
PROTHROMBIN TIME: 19.8 SEC (ref 9–11.5)

## 2018-04-05 ENCOUNTER — ANTICOAG VISIT (OUTPATIENT)
Dept: FAMILY MEDICINE CLINIC | Facility: CLINIC | Age: 58
End: 2018-04-05

## 2018-04-05 LAB
INR PPP: 2.2
PROTHROMBIN TIME: 22.2 SEC (ref 9–11.5)

## 2018-04-08 NOTE — RESULT NOTES
Verified Results  (1) PT WITH INR [id number] 07:15AM Nicolette Kwok   REPORT COMMENT:  FASTING:NO     Test Name Result Flag Reference   INR 1 9 H    Reference Range                     0 9-1 1  Moderate-intensity Warfarin Therapy 2 0-3 0  Higher-intensity Warfarin Therapy   3 0-4 0   PT 19 8 sec H 9 0-11 5   For more information on this test, go to:  http://[employer]/faq/KYA734
patient, RN, cardiology NP
patient, RN, cardiology NP

## 2018-05-04 ENCOUNTER — TELEPHONE (OUTPATIENT)
Dept: FAMILY MEDICINE CLINIC | Facility: CLINIC | Age: 58
End: 2018-05-04

## 2018-05-04 LAB
INR PPP: 3.3
PROTHROMBIN TIME: 33.8 SEC (ref 9–11.5)

## 2018-05-04 NOTE — TELEPHONE ENCOUNTER
Please call Eusebio Ambrosio and let him know that his INR was on the high side, I want him to hold one dose of his Coumadin and then do 6 days of 4mg and recheck at that time in 1 week  Really important that he checks it so that we can adjust accordingly

## 2018-05-04 NOTE — TELEPHONE ENCOUNTER
Patient informed  Also stated that he woke up today with leg pain/cramping  Dr Sujata Herbert notified and stated  For patient to monitor leg  For increase pain, and if he develops any redness, swelling, shortness of breath or chest pain to go to ER  Patient agreed on instructions and will call on Monday with update

## 2018-05-07 ENCOUNTER — ANTICOAG VISIT (OUTPATIENT)
Dept: FAMILY MEDICINE CLINIC | Facility: CLINIC | Age: 58
End: 2018-05-07

## 2018-05-08 ENCOUNTER — TELEPHONE (OUTPATIENT)
Dept: FAMILY MEDICINE CLINIC | Facility: CLINIC | Age: 58
End: 2018-05-08

## 2018-05-08 DIAGNOSIS — D68.9 COAGULATION DISORDER (HCC): Primary | ICD-10-CM

## 2018-05-11 ENCOUNTER — ANTICOAG VISIT (OUTPATIENT)
Dept: FAMILY MEDICINE CLINIC | Facility: CLINIC | Age: 58
End: 2018-05-11

## 2018-05-11 LAB
INR PPP: 3
PROTHROMBIN TIME: 30.6 SEC (ref 9–11.5)

## 2018-05-11 NOTE — PROGRESS NOTES
Advised patient per  verbal instructions 4 MG everyday to hold on Sunday's  Recheck on 5/21/18  Patient stated understanding

## 2018-05-21 ENCOUNTER — ANTICOAG VISIT (OUTPATIENT)
Dept: FAMILY MEDICINE CLINIC | Facility: CLINIC | Age: 58
End: 2018-05-21

## 2018-05-21 LAB
INR PPP: 2.1
PROTHROMBIN TIME: 21.2 SEC (ref 9–11.5)

## 2018-05-31 ENCOUNTER — OFFICE VISIT (OUTPATIENT)
Dept: FAMILY MEDICINE CLINIC | Facility: CLINIC | Age: 58
End: 2018-05-31
Payer: COMMERCIAL

## 2018-05-31 VITALS
HEIGHT: 69 IN | SYSTOLIC BLOOD PRESSURE: 118 MMHG | DIASTOLIC BLOOD PRESSURE: 74 MMHG | RESPIRATION RATE: 16 BRPM | HEART RATE: 72 BPM | WEIGHT: 203 LBS | TEMPERATURE: 97.4 F | BODY MASS INDEX: 30.07 KG/M2
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
## 2018-05-31 DIAGNOSIS — L08.9 NONVENOMOUS INSECT BITE OF FACE WITH INFECTION, INITIAL ENCOUNTER: Primary | ICD-10-CM

## 2018-05-31 DIAGNOSIS — S00.86XA NONVENOMOUS INSECT BITE OF FACE WITH INFECTION, INITIAL ENCOUNTER: Primary | ICD-10-CM

## 2018-05-31 PROCEDURE — 99213 OFFICE O/P EST LOW 20 MIN: CPT | Performed by: INTERNAL MEDICINE

## 2018-05-31 PROCEDURE — 3008F BODY MASS INDEX DOCD: CPT | Performed by: INTERNAL MEDICINE

## 2018-05-31 RX ORDER — LISINOPRIL 20 MG/1
20 TABLET ORAL DAILY
COMMUNITY
End: 2018-12-31 | Stop reason: SDUPTHER

## 2018-05-31 RX ORDER — WARFARIN SODIUM 5 MG/1
5 TABLET ORAL DAILY
COMMUNITY
End: 2019-08-08

## 2018-05-31 RX ORDER — WARFARIN SODIUM 4 MG/1
4 TABLET ORAL DAILY
COMMUNITY
End: 2018-12-31 | Stop reason: SDUPTHER

## 2018-05-31 RX ORDER — CEPHALEXIN 500 MG/1
500 CAPSULE ORAL EVERY 12 HOURS SCHEDULED
Qty: 14 CAPSULE | Refills: 0 | Status: SHIPPED | OUTPATIENT
Start: 2018-05-31 | End: 2018-06-07

## 2018-05-31 RX ORDER — ALLOPURINOL 300 MG/1
300 TABLET ORAL DAILY
COMMUNITY
End: 2018-07-17 | Stop reason: SDUPTHER

## 2018-05-31 RX ORDER — PREDNISONE 10 MG/1
TABLET ORAL
Qty: 15 TABLET | Refills: 0 | Status: SHIPPED | OUTPATIENT
Start: 2018-05-31 | End: 2018-11-16

## 2018-05-31 NOTE — PROGRESS NOTES
ASSESSMENT and PLAN:  Eusebio Ambrosio is a 56 y o  male with:   Problem List Items Addressed This Visit     None      Visit Diagnoses     Nonvenomous insect bite of face with infection, initial encounter    -  Primary    Relevant Medications    cephalexin (KEFLEX) 500 mg capsule    predniSONE 10 mg tablet          SUBJECTIVE:  Eusebio Ambrosio is a 56 y o  male who presents today with a chief complaint of Insect Bite (right neck)    Patient here for bug bite  He saw a bite on left side of neck- itchy, red raised; had chills and fever last night  He had chills this am         Review of Systems   Constitutional: Positive for chills and fever  HENT: Negative  Eyes: Negative  Respiratory: Negative  Cardiovascular: Negative  Gastrointestinal: Negative  Endocrine: Negative  Genitourinary: Negative  Musculoskeletal: Negative  Skin: Positive for wound (large single puncture wound right side of neck  with redness and surrounding erythema;  )  Allergic/Immunologic: Negative  Neurological: Negative  Hematological: Negative  Psychiatric/Behavioral: Negative  I have reviewed the patient's PMH, Social History, Medication List and Allergies  OBJECTIVE:  /74   Pulse 72   Temp (!) 97 4 °F (36 3 °C)   Resp 16   Ht 5' 9" (1 753 m)   Wt 92 1 kg (203 lb)   BMI 29 98 kg/m²   Physical Exam   Constitutional: He is oriented to person, place, and time  He appears well-developed and well-nourished  HENT:   Head: Normocephalic and atraumatic  Right Ear: External ear normal    Left Ear: External ear normal    Eyes: Conjunctivae are normal  Pupils are equal, round, and reactive to light  Neck: Normal range of motion  Neck supple  No JVD present  No tracheal deviation present  No thyromegaly present  Cardiovascular: Normal rate and regular rhythm  Pulmonary/Chest: Effort normal and breath sounds normal  No respiratory distress  He has no wheezes  He has no rales     Musculoskeletal: Normal range of motion  Neurological: He is alert and oriented to person, place, and time  Skin: Skin is warm and dry  Large reddened area right side of neck with single puncture rightside of neck with surrounding erythema   Nursing note and vitals reviewed

## 2018-06-19 LAB
INR PPP: 3.8
PROTHROMBIN TIME: 39 SEC (ref 9–11.5)

## 2018-06-20 ENCOUNTER — ANTICOAG VISIT (OUTPATIENT)
Dept: FAMILY MEDICINE CLINIC | Facility: CLINIC | Age: 58
End: 2018-06-20

## 2018-06-20 ENCOUNTER — TELEPHONE (OUTPATIENT)
Dept: FAMILY MEDICINE CLINIC | Facility: CLINIC | Age: 58
End: 2018-06-20

## 2018-06-20 NOTE — TELEPHONE ENCOUNTER
Spoke with pt, gave message  ----- Message from Markell Liriano DO sent at 6/20/2018  7:26 AM EDT -----  Please call the patient regarding his abnormal result  call pt - inr too high- skip one dose then resume and check inr in 10 days

## 2018-06-29 ENCOUNTER — ANTICOAG VISIT (OUTPATIENT)
Dept: FAMILY MEDICINE CLINIC | Facility: CLINIC | Age: 58
End: 2018-06-29

## 2018-06-29 ENCOUNTER — TELEPHONE (OUTPATIENT)
Dept: FAMILY MEDICINE CLINIC | Facility: CLINIC | Age: 58
End: 2018-06-29

## 2018-06-29 LAB
INR PPP: 2.3
PROTHROMBIN TIME: 23.4 SEC (ref 9–11.5)

## 2018-07-17 DIAGNOSIS — E79.0 HYPERURICEMIA: Primary | ICD-10-CM

## 2018-07-18 RX ORDER — ALLOPURINOL 300 MG/1
TABLET ORAL
Qty: 90 TABLET | Refills: 3 | Status: SHIPPED | OUTPATIENT
Start: 2018-07-18 | End: 2018-12-31 | Stop reason: SDUPTHER

## 2018-07-27 LAB
INR PPP: 2.4
PROTHROMBIN TIME: 24.4 SEC (ref 9–11.5)

## 2018-07-30 ENCOUNTER — ANTICOAG VISIT (OUTPATIENT)
Dept: FAMILY MEDICINE CLINIC | Facility: CLINIC | Age: 58
End: 2018-07-30

## 2018-07-30 ENCOUNTER — TELEPHONE (OUTPATIENT)
Dept: FAMILY MEDICINE CLINIC | Facility: CLINIC | Age: 58
End: 2018-07-30

## 2018-07-30 NOTE — TELEPHONE ENCOUNTER
Spoke with pt, gave message      ----- Message from Morena Case DO sent at 7/29/2018  9:41 AM EDT -----  Please call the patient regarding his abnormal result   Call pt- inr stable- same dose recheck in 4 weeks

## 2018-08-28 ENCOUNTER — ANTICOAG VISIT (OUTPATIENT)
Dept: FAMILY MEDICINE CLINIC | Facility: CLINIC | Age: 58
End: 2018-08-28

## 2018-08-28 LAB
INR PPP: 3.4
PROTHROMBIN TIME: 34.6 SEC (ref 9–11.5)

## 2018-09-13 ENCOUNTER — TELEPHONE (OUTPATIENT)
Dept: FAMILY MEDICINE CLINIC | Facility: CLINIC | Age: 58
End: 2018-09-13

## 2018-09-13 NOTE — TELEPHONE ENCOUNTER
Patient notified of message and is requesting a RX be called in for him for tennis elbow     DICLOFENAC 1% 100 grams  Apply as needed for tendonitis      [facility]

## 2018-09-13 NOTE — TELEPHONE ENCOUNTER
Patient is calling to see if he can use DICLOFENAC topical gel for tendonitis while he is taking warfarin?  Please advise  [phone]

## 2018-09-14 DIAGNOSIS — M77.9 TENDONITIS: Primary | ICD-10-CM

## 2018-09-14 NOTE — TELEPHONE ENCOUNTER
Patient calling back to check on status of request for DICLOFENAC   Call patient when script is called in  [phone]

## 2018-09-27 LAB
INR PPP: 3.2
PROTHROMBIN TIME: 32.9 SEC (ref 9–11.5)

## 2018-09-28 ENCOUNTER — TELEPHONE (OUTPATIENT)
Dept: FAMILY MEDICINE CLINIC | Facility: CLINIC | Age: 58
End: 2018-09-28

## 2018-09-28 ENCOUNTER — ANTICOAG VISIT (OUTPATIENT)
Dept: FAMILY MEDICINE CLINIC | Facility: CLINIC | Age: 58
End: 2018-09-28

## 2018-10-02 ENCOUNTER — TELEPHONE (OUTPATIENT)
Dept: FAMILY MEDICINE CLINIC | Facility: CLINIC | Age: 58
End: 2018-10-02

## 2018-10-02 DIAGNOSIS — D68.51 FACTOR V LEIDEN MUTATION (HCC): Primary | ICD-10-CM

## 2018-10-02 DIAGNOSIS — Z79.01 CHRONIC ANTICOAGULATION: ICD-10-CM

## 2018-10-02 NOTE — TELEPHONE ENCOUNTER
Patient called  He needs an new PT INR slip  Can you please enter one for patient good for 6 months?

## 2018-10-12 ENCOUNTER — ANTICOAG VISIT (OUTPATIENT)
Dept: FAMILY MEDICINE CLINIC | Facility: CLINIC | Age: 58
End: 2018-10-12

## 2018-10-12 LAB
INR PPP: 3.1
PROTHROMBIN TIME: 31.1 SEC (ref 9–11.5)

## 2018-10-12 NOTE — PROGRESS NOTES
Patient notified of dosing instructions per Dr Scar Ballesteros, stay on same dose of coumadin, 4 mg, and repeat INR in 2 weeks

## 2018-10-26 ENCOUNTER — TELEPHONE (OUTPATIENT)
Dept: FAMILY MEDICINE CLINIC | Facility: CLINIC | Age: 58
End: 2018-10-26

## 2018-10-26 ENCOUNTER — ANTICOAG VISIT (OUTPATIENT)
Dept: FAMILY MEDICINE CLINIC | Facility: CLINIC | Age: 58
End: 2018-10-26

## 2018-10-26 LAB
INR PPP: 3.9
PROTHROMBIN TIME: 39.6 SEC (ref 9–11.5)

## 2018-10-26 NOTE — TELEPHONE ENCOUNTER
Please call Andrzej Paniagua and let him know that his labs showed his INR is too high  I would like him to hold one dose and do 4mg on Mon-Thursday and 0mg on Fri-Sun  And Recheck on 11/5    Thank you! Orders Only on 10/26/2018   Component Date Value Ref Range Status    INR 10/26/2018 3 9*  Final    Comment: Reference Range                     0 9-1 1  Moderate-intensity Warfarin Therapy 2 0-3 0  Higher-intensity Warfarin Therapy   3 0-4 0          SL AMB PROTHROMBIN TIME 10/26/2018 39 6* 9 0 - 11 5 sec Final    Comment: For more information on this test, go to:  http://[employer]/faq/DXY498

## 2018-11-05 ENCOUNTER — TELEPHONE (OUTPATIENT)
Dept: FAMILY MEDICINE CLINIC | Facility: CLINIC | Age: 58
End: 2018-11-05

## 2018-11-05 LAB
INR PPP: 1.2
PROTHROMBIN TIME: 12.6 SEC (ref 9–11.5)

## 2018-11-05 NOTE — TELEPHONE ENCOUNTER
Please call Jl Abarca and let him know that his labs showed: his INR is too low, have him take an extra 2mg today and then change to 4mg Mon-Thur, 2mg Friday and 0 on Sat/Sun and recheck in 2 weeks    Thank you! Orders Only on 11/05/2018   Component Date Value Ref Range Status    INR 11/05/2018 1 2*  Final    Comment: Reference Range                     0 9-1 1  Moderate-intensity Warfarin Therapy 2 0-3 0  Higher-intensity Warfarin Therapy   3 0-4 0          Prothrombin Time 11/05/2018 12 6* 9 0 - 11 5 sec Final    Comment: For more information on this test, go to:  http://[employer]/faq/[email or web address]

## 2018-11-16 ENCOUNTER — OFFICE VISIT (OUTPATIENT)
Dept: FAMILY MEDICINE CLINIC | Facility: CLINIC | Age: 58
End: 2018-11-16
Payer: COMMERCIAL

## 2018-11-16 ENCOUNTER — TELEPHONE (OUTPATIENT)
Dept: FAMILY MEDICINE CLINIC | Facility: CLINIC | Age: 58
End: 2018-11-16

## 2018-11-16 VITALS
RESPIRATION RATE: 15 BRPM | TEMPERATURE: 98 F | WEIGHT: 209.6 LBS | DIASTOLIC BLOOD PRESSURE: 80 MMHG | BODY MASS INDEX: 31.04 KG/M2 | HEIGHT: 69 IN | HEART RATE: 78 BPM | SYSTOLIC BLOOD PRESSURE: 104 MMHG

## 2018-11-16 DIAGNOSIS — Z23 NEED FOR INFLUENZA VACCINATION: ICD-10-CM

## 2018-11-16 DIAGNOSIS — D69.6 THROMBOCYTOPENIA (HCC): ICD-10-CM

## 2018-11-16 DIAGNOSIS — E66.09 CLASS 1 OBESITY DUE TO EXCESS CALORIES WITH SERIOUS COMORBIDITY AND BODY MASS INDEX (BMI) OF 30.0 TO 30.9 IN ADULT: ICD-10-CM

## 2018-11-16 DIAGNOSIS — M1A.0710 CHRONIC IDIOPATHIC GOUT INVOLVING TOE OF RIGHT FOOT WITHOUT TOPHUS: ICD-10-CM

## 2018-11-16 DIAGNOSIS — I10 ESSENTIAL HYPERTENSION: Primary | ICD-10-CM

## 2018-11-16 DIAGNOSIS — K76.0 FATTY LIVER: ICD-10-CM

## 2018-11-16 DIAGNOSIS — D68.51 FACTOR V LEIDEN MUTATION (HCC): ICD-10-CM

## 2018-11-16 DIAGNOSIS — E78.00 PURE HYPERCHOLESTEROLEMIA: ICD-10-CM

## 2018-11-16 DIAGNOSIS — R00.2 HEART PALPITATIONS: ICD-10-CM

## 2018-11-16 PROBLEM — S00.86XA NONVENOMOUS INSECT BITE OF FACE WITH INFECTION: Status: RESOLVED | Noted: 2018-05-31 | Resolved: 2018-11-16
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
## 2018-11-16 PROBLEM — L08.9 NONVENOMOUS INSECT BITE OF FACE WITH INFECTION: Status: RESOLVED | Noted: 2018-05-31 | Resolved: 2018-11-16

## 2018-11-16 PROBLEM — E66.811 CLASS 1 OBESITY DUE TO EXCESS CALORIES WITH SERIOUS COMORBIDITY AND BODY MASS INDEX (BMI) OF 30.0 TO 30.9 IN ADULT: Status: ACTIVE | Noted: 2018-11-16

## 2018-11-16 PROCEDURE — 3079F DIAST BP 80-89 MM HG: CPT | Performed by: FAMILY MEDICINE

## 2018-11-16 PROCEDURE — 3008F BODY MASS INDEX DOCD: CPT | Performed by: FAMILY MEDICINE

## 2018-11-16 PROCEDURE — 99214 OFFICE O/P EST MOD 30 MIN: CPT | Performed by: FAMILY MEDICINE

## 2018-11-16 PROCEDURE — 90471 IMMUNIZATION ADMIN: CPT | Performed by: FAMILY MEDICINE

## 2018-11-16 PROCEDURE — 3074F SYST BP LT 130 MM HG: CPT | Performed by: FAMILY MEDICINE

## 2018-11-16 PROCEDURE — 1036F TOBACCO NON-USER: CPT | Performed by: FAMILY MEDICINE

## 2018-11-16 PROCEDURE — 90682 RIV4 VACC RECOMBINANT DNA IM: CPT | Performed by: FAMILY MEDICINE

## 2018-11-16 PROCEDURE — 93000 ELECTROCARDIOGRAM COMPLETE: CPT | Performed by: FAMILY MEDICINE

## 2018-11-16 NOTE — ASSESSMENT & PLAN NOTE
Wt Readings from Last 3 Encounters:   11/16/18 95 1 kg (209 lb 9 6 oz)   05/31/18 92 1 kg (203 lb)   11/29/17 89 1 kg (196 lb 8 oz)     -Body mass index is 30 95 kg/m²    -Reviewed healthy diet- high in protein, low in carbohydrates, high calorie/low nutrition foods, try to stop drinking regular and diet sodas  -Drink at least 8 glasses of pure water a day  -Get at least 30 minutes of breathless exercise 4-5 days per week
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
## 2018-11-16 NOTE — ASSESSMENT & PLAN NOTE
BP Readings from Last 3 Encounters:   11/16/18 104/80   05/31/18 118/74   11/29/17 146/94           Invalid input(s): LABALBU  Controlled on current regimen:  - and pro 20 mg   -Metoprolol succinate 50 mg b i d      check CMP

## 2018-11-16 NOTE — ASSESSMENT & PLAN NOTE
Well controlled with Allopurinol 300mg daily  Recheck uric acid  Lab Results   Component Value Date    URICACID 3 3 (L) 12/01/2017

## 2018-11-16 NOTE — ASSESSMENT & PLAN NOTE
Normal cardiovascular exam, EKG done in the office showed normal sinus rhythm at 74 beats per minute without any PACs or PVCs  Reassured patient that he should continue on his current dose of metoprolol and to call if symptoms persist or worsen

## 2018-11-16 NOTE — ASSESSMENT & PLAN NOTE
Lab Results   Component Value Date    WBC 7 3 12/01/2017    HGB 17 3 (H) 12/01/2017    HCT 48 1 12/01/2017    MCV 95 8 12/01/2017     (L) 12/01/2017     Followed by Hematology and had extensive negative work-up  Recheck CBC

## 2018-11-16 NOTE — ASSESSMENT & PLAN NOTE
Last Lipid Panel:  Lab Results   Component Value Date    CHOL 201 (H) 12/01/2017    HDL 72 12/01/2017    TRIG 78 12/01/2017     Recheck FLP

## 2018-11-16 NOTE — ASSESSMENT & PLAN NOTE
Lab Results   Component Value Date    ALT 39 12/01/2017    AST 33 12/01/2017    GGT 88 06/17/2016    ALKPHOS 53 12/01/2017    BILITOT 0 9 12/01/2017     Recheck CMP  Advised weight loss

## 2018-11-16 NOTE — PROGRESS NOTES
FAMILY MEDICINE PROGRESS NOTE  Juliana Romero 66 y o  male   DATE: November 16, 2018     ASSESSMENT and PLAN:  Juliana Romero is a 66 y o  male with: Thrombocytopenia ([facility] [address] )  Lab Results   Component Value Date    WBC 7 3 12/01/2017    HGB 17 3 (H) 12/01/2017    HCT 48 1 12/01/2017    MCV 95 8 12/01/2017     (L) 12/01/2017     Followed by Hematology and had extensive negative work-up  Recheck CBC      Essential hypertension  BP Readings from Last 3 Encounters:   11/16/18 104/80   05/31/18 118/74   11/29/17 146/94           Invalid input(s): LABALBU  Controlled on current regimen:  - and pro 20 mg   -Metoprolol succinate 50 mg b i d      check CMP    Pure hypercholesterolemia  Last Lipid Panel:  Lab Results   Component Value Date    CHOL 201 (H) 12/01/2017    HDL 72 12/01/2017    TRIG 78 12/01/2017     Recheck FLP    Fatty liver  Lab Results   Component Value Date    ALT 39 12/01/2017    AST 33 12/01/2017    GGT 88 06/17/2016    ALKPHOS 53 12/01/2017    BILITOT 0 9 12/01/2017     Recheck CMP  Advised weight loss    Factor V Leiden mutation ([facility] [address] )  History of Factory V Leiden and multiple DVTs years ago after prolonged drive in [facility]  Had seen hematologist  -On Chronic Coumadin, but has been having labile INRs recently   he has new insurance starting in December, will have patient inquired about novel oral anticoagulants versus home monitoring INR  Chronic idiopathic gout involving toe of right foot without tophus  Well controlled with Allopurinol 300mg daily  Recheck uric acid  Lab Results   Component Value Date    URICACID 3 3 (L) 12/01/2017         Class 1 obesity due to excess calories with serious comorbidity and body mass index (BMI) of 30 0 to 30 9 in adult  Wt Readings from Last 3 Encounters:   11/16/18 95 1 kg (209 lb 9 6 oz)   05/31/18 92 1 kg (203 lb)   11/29/17 89 1 kg (196 lb 8 oz)     -Body mass index is 30 95 kg/m²    -Reviewed healthy diet- high in protein, low in carbohydrates, high calorie/low nutrition foods, try to stop drinking regular and diet sodas  -Drink at least 8 glasses of pure water a day  -Get at least 30 minutes of breathless exercise 4-5 days per week      Heart palpitations    Normal cardiovascular exam, EKG done in the office showed normal sinus rhythm at 74 beats per minute without any PACs or PVCs  Reassured patient that he should continue on his current dose of metoprolol and to call if symptoms persist or worsen  SUBJECTIVE:  Otilia Zuñiga is a 66 y o  male who presents today with a chief complaint of Hypertension; Anticoagulation; and Palpitations (heart palpitations on and off)  Pt is here for chronic med follow up and to establish with me as a new patient  1  HTN- on 2 meds, asymptomatic, compliant with meds  Occ gets chest pain, saw Cardiology last year and had a treadmill stress test that was normal  Will get dull pressure in his left anterior chest after exertion  Has occ palpitations and had a Holter monitor done in January 2018 and has improved with higher dose of Metoprolol dose  2  Gout- one medication well controlled, last flare up was years ago in his right big toe  3  HLD- due for FLP  4  Fatty liver- no labs recently      Hypertension   Associated symptoms include chest pain and palpitations  Pertinent negatives include no blurred vision, peripheral edema or shortness of breath  Palpitations   Associated symptoms include chest pain  Pertinent negatives include no fatigue  Review of Systems   Constitutional: Negative for fatigue  Eyes: Negative for blurred vision  Respiratory: Negative for shortness of breath  Cardiovascular: Positive for chest pain and palpitations  Psychiatric/Behavioral: The patient is not nervous/anxious  I have reviewed the patient's PMH, Social History, Medication List and Allergies as appropriate        OBJECTIVE:  /80 (BP Location: Left arm, Patient Position: Sitting, Cuff Size: Standard)   Pulse 78   Temp 98 °F (36 7 °C)   Resp 15   Ht 5' 9" (1 753 m)   Wt 95 1 kg (209 lb 9 6 oz)   BMI 30 95 kg/m²    Physical Exam   Constitutional: He appears well-developed and well-nourished  No distress  obese   Cardiovascular: Normal rate, regular rhythm and normal heart sounds  Pulmonary/Chest: Effort normal and breath sounds normal  No respiratory distress  He has no wheezes  He has no rales  Skin: He is not diaphoretic  Vitals reviewed  [employer]  Shaq Lorenzo MD    Note: Portions of the record may have been created with voice recognition software  Occasional wrong word or "sound a like" substitutions may have occurred due to the inherent limitations of voice recognition software  Read the chart carefully and recognize, using context, where substitutions have occurred

## 2018-11-20 ENCOUNTER — TELEPHONE (OUTPATIENT)
Dept: FAMILY MEDICINE CLINIC | Facility: CLINIC | Age: 58
End: 2018-11-20

## 2018-11-20 ENCOUNTER — ANTICOAG VISIT (OUTPATIENT)
Dept: FAMILY MEDICINE CLINIC | Facility: CLINIC | Age: 58
End: 2018-11-20

## 2018-11-20 LAB
INR PPP: 1.3
PROTHROMBIN TIME: 13.8 SEC (ref 9–11.5)

## 2018-11-20 NOTE — TELEPHONE ENCOUNTER
Spoke with patient, he did not stop warfarin  He is taking 4 mg 5 days per week and nothing 2 days per week

## 2018-11-21 LAB
ALBUMIN SERPL-MCNC: 4.5 G/DL (ref 3.6–5.1)
ALBUMIN/GLOB SERPL: 2 (CALC) (ref 1–2.5)
ALP SERPL-CCNC: 61 U/L (ref 40–115)
ALT SERPL-CCNC: 50 U/L (ref 9–46)
AST SERPL-CCNC: 39 U/L (ref 10–35)
BASOPHILS # BLD AUTO: 92 CELLS/UL (ref 0–200)
BASOPHILS NFR BLD AUTO: 1.2 %
BILIRUB SERPL-MCNC: 1 MG/DL (ref 0.2–1.2)
BUN SERPL-MCNC: 11 MG/DL (ref 7–25)
BUN/CREAT SERPL: ABNORMAL (CALC) (ref 6–22)
CALCIUM SERPL-MCNC: 9.8 MG/DL (ref 8.6–10.3)
CHLORIDE SERPL-SCNC: 102 MMOL/L (ref 98–110)
CHOLEST SERPL-MCNC: 184 MG/DL
CHOLEST/HDLC SERPL: 3 (CALC)
CO2 SERPL-SCNC: 27 MMOL/L (ref 20–32)
CREAT SERPL-MCNC: 1.01 MG/DL (ref 0.7–1.33)
EOSINOPHIL # BLD AUTO: 385 CELLS/UL (ref 15–500)
EOSINOPHIL NFR BLD AUTO: 5 %
ERYTHROCYTE [DISTWIDTH] IN BLOOD BY AUTOMATED COUNT: 13 % (ref 11–15)
GLOBULIN SER CALC-MCNC: 2.2 G/DL (CALC) (ref 1.9–3.7)
GLUCOSE SERPL-MCNC: 100 MG/DL (ref 65–99)
HCT VFR BLD AUTO: 51.4 % (ref 38.5–50)
HDLC SERPL-MCNC: 61 MG/DL
HGB BLD-MCNC: 18.7 G/DL (ref 13.2–17.1)
LDLC SERPL CALC-MCNC: 106 MG/DL (CALC)
LYMPHOCYTES # BLD AUTO: 2456 CELLS/UL (ref 850–3900)
LYMPHOCYTES NFR BLD AUTO: 31.9 %
MCH RBC QN AUTO: 34.4 PG (ref 27–33)
MCHC RBC AUTO-ENTMCNC: 36.4 G/DL (ref 32–36)
MCV RBC AUTO: 94.7 FL (ref 80–100)
MONOCYTES # BLD AUTO: 516 CELLS/UL (ref 200–950)
MONOCYTES NFR BLD AUTO: 6.7 %
NEUTROPHILS # BLD AUTO: 4250 CELLS/UL (ref 1500–7800)
NEUTROPHILS NFR BLD AUTO: 55.2 %
NONHDLC SERPL-MCNC: 123 MG/DL (CALC)
PLATELET # BLD AUTO: 138 THOUSAND/UL (ref 140–400)
PMV BLD REES-ECKER: 11.4 FL (ref 7.5–12.5)
POTASSIUM SERPL-SCNC: 4.2 MMOL/L (ref 3.5–5.3)
PROT SERPL-MCNC: 6.7 G/DL (ref 6.1–8.1)
RBC # BLD AUTO: 5.43 MILLION/UL (ref 4.2–5.8)
SL AMB EGFR AFRICAN AMERICAN: 96 ML/MIN/1.73M2
SL AMB EGFR NON AFRICAN AMERICAN: 83 ML/MIN/1.73M2
SODIUM SERPL-SCNC: 140 MMOL/L (ref 135–146)
TRIGL SERPL-MCNC: 80 MG/DL
TSH SERPL-ACNC: 3.77 MIU/L (ref 0.4–4.5)
URATE SERPL-MCNC: 3.5 MG/DL (ref 4–8)
WBC # BLD AUTO: 7.7 THOUSAND/UL (ref 3.8–10.8)

## 2018-11-25 ENCOUNTER — TELEPHONE (OUTPATIENT)
Dept: FAMILY MEDICINE CLINIC | Facility: CLINIC | Age: 58
End: 2018-11-25

## 2018-11-25 DIAGNOSIS — D58.2 ABNORMAL HEMOGLOBIN (HGB) (HCC): Primary | ICD-10-CM

## 2018-11-25 PROBLEM — E74.39 GLUCOSE INTOLERANCE: Status: ACTIVE | Noted: 2018-11-25

## 2018-11-25 NOTE — TELEPHONE ENCOUNTER
Please call Mini Garciairasemaky and let him know that his labs showed:  1  Your electrolytes, kidney function, liver function, uric acid and thyroid are all in the normal range! His cholesterol is slightly high with ASCVD risk of 3 8%, so no statin at this time, it is improved from last year  Should follow a low cholesterol diet regardless though  2  His platelets are slightly low and his HgbHct are slightly high  If he is still seeing a hematologist, he can f/u with them or I would recommend further testing for hemochromatosis  3  His liver enzymes are elevated may be related to fatty liver versus related to hemachromatosis  4  Elevated glucose, may have pre-diabetes, should limit carbs in his diet  Also, have him give us a call in December after he discusses with his new insurance company about noacs such as pradaxa, eliquis, xarelto instead of coumadin  We may be able to give him samples if the insurance company doesn't cover it  Thank you! Orders Only on 11/20/2018   Component Date Value Ref Range Status    Total Cholesterol 11/20/2018 184  <200 mg/dL Final    HDL 11/20/2018 61  >40 mg/dL Final    Triglycerides 11/20/2018 80  <150 mg/dL Final    LDL Direct 11/20/2018 106* mg/dL (calc) Final    Comment: Reference range: <100     Desirable range <100 mg/dL for primary prevention;    <70 mg/dL for patients with CHD or diabetic patients   with > or = 2 CHD risk factors  LDL-C is now calculated using the Juan Luis-Storm   calculation, which is a validated novel method providing   better accuracy than the Friedewald equation in the   estimation of LDL-C  Elisa Ortiz et al  [facility]  2868;894): [id number]   (http://[employer]/faq/ZQG283)      Chol HDLC Ratio 11/20/2018 3 0  <5 0 (calc) Final    Non-HDL Cholesterol 11/20/2018 123  <130 mg/dL (calc) Final    Comment: For patients with diabetes plus 1 major ASCVD risk   factor, treating to a non-HDL-C goal of <100 mg/dL   (LDL-C of <70 mg/dL) is considered a therapeutic   option   Uric Acid 11/20/2018 3 5* 4 0 - 8 0 mg/dL Final    Comment: Therapeutic target for gout patients: <6 0 mg/dL          Glucose, Random 11/20/2018 100* 65 - 99 mg/dL Final    Comment:               Fasting reference interval     For someone without known diabetes, a glucose value  between 100 and 125 mg/dL is consistent with  prediabetes and should be confirmed with a  follow-up test          BUN 11/20/2018 11  7 - 25 mg/dL Final    Creatinine 11/20/2018 1 01  0 70 - 1 33 mg/dL Final    Comment: For patients >52years of age, the reference limit  for Creatinine is approximately 13% higher for people  identified as -American           eGFR Non  11/20/2018 83  > OR = 60 mL/min/1 73m2 Final    SL AMB EGFR  11/20/2018 96  > OR = 60 mL/min/1 73m2 Final    SL AMB BUN/CREATININE RATIO 74/01/6663 NOT APPLICABLE  6 - 22 (calc) Final    Sodium 11/20/2018 140  135 - 146 mmol/L Final    Potassium 11/20/2018 4 2  3 5 - 5 3 mmol/L Final    Chloride 11/20/2018 102  98 - 110 mmol/L Final    CO2 11/20/2018 27  20 - 32 mmol/L Final    SL AMB CALCIUM 11/20/2018 9 8  8 6 - 10 3 mg/dL Final    SL AMB PROTEIN, TOTAL 11/20/2018 6 7  6 1 - 8 1 g/dL Final    Albumin 11/20/2018 4 5  3 6 - 5 1 g/dL Final    Globulin 11/20/2018 2 2  1 9 - 3 7 g/dL (calc) Final    Albumin/Globulin Ratio 11/20/2018 2 0  1 0 - 2 5 (calc) Final    TOTAL BILIRUBIN 11/20/2018 1 0  0 2 - 1 2 mg/dL Final    Alkaline Phosphatase 11/20/2018 61  40 - 115 U/L Final    SL AMB AST 11/20/2018 39* 10 - 35 U/L Final    SL AMB ALT 11/20/2018 50* 9 - 46 U/L Final    White Blood Cell Count 11/20/2018 7 7  3 8 - 10 8 Thousand/uL Final    Red Blood Cell Count 11/20/2018 5 43  4 20 - 5 80 Million/uL Final    Hemoglobin 11/20/2018 18 7* 13 2 - 17 1 g/dL Final    HCT 11/20/2018 51 4* 38 5 - 50 0 % Final    MCV 11/20/2018 94 7  80 0 - 100 0 fL Final    MCH 11/20/2018 34 4* 27 0 - 33 0 pg Final    MCHC 11/20/2018 36 4* 32 0 - 36 0 g/dL Final    RDW 11/20/2018 13 0  11 0 - 15 0 % Final    Platelet Count 69/01/1699 138* 140 - 400 Thousand/uL Final    SL AMB MPV 11/20/2018 11 4  7 5 - 12 5 fL Final    Neutrophils (Absolute) 11/20/2018 4250  1,500 - 7,800 cells/uL Final    Lymphocytes (Absolute) 11/20/2018 2456  850 - 3,900 cells/uL Final    Monocytes (Absolute) 11/20/2018 516  200 - 950 cells/uL Final    Eosinophils (Absolute) 11/20/2018 385  15 - 500 cells/uL Final    Basophils ABS 11/20/2018 92  0 - 200 cells/uL Final    Neutrophils 11/20/2018 55 2  % Final    Lymphocytes 11/20/2018 31 9  % Final    Monocytes 11/20/2018 6 7  % Final    Eosinophils 11/20/2018 5 0  % Final    Basophils PCT 11/20/2018 1 2  % Final    TSH W/RFX TO FREE T4 11/20/2018 3 77  0 40 - 4 50 mIU/L Final   Orders Only on 11/20/2018   Component Date Value Ref Range Status    INR 11/20/2018 1 3*  Final    Comment: Reference Range                     0 9-1 1  Moderate-intensity Warfarin Therapy 2 0-3 0  Higher-intensity Warfarin Therapy   3 0-4 0          Prothrombin Time 11/20/2018 13 8* 9 0 - 11 5 sec Final    Comment: For more information on this test, go to:  http://[employer]/faq/HOS181

## 2018-11-26 NOTE — TELEPHONE ENCOUNTER
Patient notified of message, he doesn't see Hematology anymore   Patient states he will call insurance company in the [address] [place] [address] in regards to the [address] [place] [address]

## 2018-11-28 ENCOUNTER — TELEPHONE (OUTPATIENT)
Dept: FAMILY MEDICINE CLINIC | Facility: CLINIC | Age: 58
End: 2018-11-28

## 2018-11-28 LAB
INR PPP: 1.7
PROTHROMBIN TIME: 17.8 SEC (ref 9–11.5)

## 2018-11-28 NOTE — TELEPHONE ENCOUNTER
Call INR improved to 1 7 on current dose of Warfarin  He was changed to daily dosing    Continue with same dose protime in 1 week

## 2018-11-29 ENCOUNTER — ANTICOAG VISIT (OUTPATIENT)
Dept: FAMILY MEDICINE CLINIC | Facility: CLINIC | Age: 58
End: 2018-11-29

## 2018-12-11 ENCOUNTER — ANTICOAG VISIT (OUTPATIENT)
Dept: FAMILY MEDICINE CLINIC | Facility: CLINIC | Age: 58
End: 2018-12-11

## 2018-12-11 ENCOUNTER — TELEPHONE (OUTPATIENT)
Dept: FAMILY MEDICINE CLINIC | Facility: CLINIC | Age: 58
End: 2018-12-11

## 2018-12-11 LAB
INR PPP: 3.8
PROTHROMBIN TIME: 38.9 SEC (ref 9–11.5)

## 2018-12-11 NOTE — PROGRESS NOTES
Patient notified of dosing instructions  Hold today's dose  Then 4 mg M-F , 2 mg Sat and Sun  Repeat INR in 1 week  Patient states that he has 4 mg and 5 mg tablets in the home

## 2018-12-11 NOTE — TELEPHONE ENCOUNTER
Patient notified of dosing instructions, hold today's dose, then 4 mg M-F, 2 mg Sat and Sun and repeat INR in 1 week  Patient states that he has 4 & 5 mg tablets in the home

## 2018-12-11 NOTE — TELEPHONE ENCOUNTER
Call INR 3 8 goal 2 to 3  Currently on Warfarin 4 mg daily  Hold one dose then change to 4 mg M-F  2 mg Saturday and Sunday   INR one week

## 2018-12-19 LAB
INR PPP: 1.5
PROTHROMBIN TIME: 15.8 SEC (ref 9–11.5)

## 2018-12-20 ENCOUNTER — ANTICOAG VISIT (OUTPATIENT)
Dept: FAMILY MEDICINE CLINIC | Facility: CLINIC | Age: 58
End: 2018-12-20

## 2018-12-20 ENCOUNTER — TELEPHONE (OUTPATIENT)
Dept: FAMILY MEDICINE CLINIC | Facility: CLINIC | Age: 58
End: 2018-12-20

## 2018-12-20 NOTE — TELEPHONE ENCOUNTER
His insurance was supposed to change in December, have him check if Eliquis, Xarelto, Pradaxa, etc, any of the NOACS are covered/for how much? We can switch him from the coumadin  If not, we can look into samples as well, we have some

## 2018-12-20 NOTE — TELEPHONE ENCOUNTER
Patient notified of message  Dr Nilsa Barraza , patient requested to speak with you about taking Eliquis, states he has had it with  The constant labs and warfarin

## 2018-12-20 NOTE — TELEPHONE ENCOUNTER
Call re INR 1 5 goal 2 to 3  Last dose Warfarin 4 mg M-F and 2 mg sat and Sunday   I would have go back on 4 mg daily and check INR one week     Recent Results (from the past 168 hour(s))   Protime-INR    Collection Time: 12/19/18  8:08 AM   Result Value Ref Range    INR 1 5 (H)     Prothrombin Time 15 8 (H) 9 0 - 11 5 sec

## 2018-12-21 NOTE — TELEPHONE ENCOUNTER
I spoke with patient, he states that the Eliquis is covered for 70 00 for a 90 day supply  He is going to see if Pradaxa is in the formulary and how much that would be  Please leave message in task

## 2018-12-26 ENCOUNTER — TELEPHONE (OUTPATIENT)
Dept: FAMILY MEDICINE CLINIC | Facility: CLINIC | Age: 58
End: 2018-12-26

## 2018-12-26 LAB
INR PPP: 2.5
PROTHROMBIN TIME: 26 SEC (ref 9–11.5)

## 2018-12-27 ENCOUNTER — ANTICOAG VISIT (OUTPATIENT)
Dept: FAMILY MEDICINE CLINIC | Facility: CLINIC | Age: 58
End: 2018-12-27

## 2018-12-31 DIAGNOSIS — E79.0 HYPERURICEMIA: ICD-10-CM

## 2018-12-31 DIAGNOSIS — I10 ESSENTIAL (PRIMARY) HYPERTENSION: Primary | ICD-10-CM

## 2018-12-31 DIAGNOSIS — D68.51 FACTOR V LEIDEN (HCC): Primary | ICD-10-CM

## 2018-12-31 RX ORDER — WARFARIN SODIUM 4 MG/1
4 TABLET ORAL DAILY
Qty: 90 TABLET | Refills: 1 | Status: SHIPPED | OUTPATIENT
Start: 2018-12-31 | End: 2019-06-20 | Stop reason: SDUPTHER

## 2018-12-31 RX ORDER — METOPROLOL SUCCINATE 50 MG/1
TABLET, EXTENDED RELEASE ORAL
Qty: 180 TABLET | Refills: 1 | Status: SHIPPED | OUTPATIENT
Start: 2018-12-31 | End: 2019-02-25 | Stop reason: SDUPTHER

## 2018-12-31 RX ORDER — ALLOPURINOL 300 MG/1
300 TABLET ORAL DAILY
Qty: 90 TABLET | Refills: 1 | Status: SHIPPED | OUTPATIENT
Start: 2018-12-31 | End: 2019-06-20 | Stop reason: SDUPTHER

## 2018-12-31 RX ORDER — LISINOPRIL 20 MG/1
20 TABLET ORAL DAILY
Qty: 90 TABLET | Refills: 1 | Status: SHIPPED | OUTPATIENT
Start: 2018-12-31 | End: 2019-06-20 | Stop reason: SDUPTHER

## 2019-01-17 ENCOUNTER — OFFICE VISIT (OUTPATIENT)
Dept: FAMILY MEDICINE CLINIC | Facility: CLINIC | Age: 59
End: 2019-01-17
Payer: COMMERCIAL

## 2019-01-17 VITALS
WEIGHT: 207 LBS | RESPIRATION RATE: 16 BRPM | DIASTOLIC BLOOD PRESSURE: 80 MMHG | SYSTOLIC BLOOD PRESSURE: 118 MMHG | TEMPERATURE: 98.7 F | HEART RATE: 88 BPM | BODY MASS INDEX: 30.66 KG/M2 | HEIGHT: 69 IN

## 2019-01-17 DIAGNOSIS — R13.19 ESOPHAGEAL DYSPHAGIA: ICD-10-CM

## 2019-01-17 DIAGNOSIS — D68.51 FACTOR V LEIDEN MUTATION (HCC): Primary | ICD-10-CM

## 2019-01-17 PROCEDURE — 1036F TOBACCO NON-USER: CPT | Performed by: FAMILY MEDICINE

## 2019-01-17 PROCEDURE — 99214 OFFICE O/P EST MOD 30 MIN: CPT | Performed by: FAMILY MEDICINE

## 2019-01-17 PROCEDURE — 3008F BODY MASS INDEX DOCD: CPT | Performed by: FAMILY MEDICINE

## 2019-01-17 NOTE — PROGRESS NOTES
FAMILY MEDICINE PROGRESS NOTE  Bhanu Hamilton 66 y o  male   DATE: January 17, 2019     ASSESSMENT and PLAN:  Bhanu Hamilton is a 66 y o  male with:     Factor V Leiden mutation ([address] )  History of Factory V Leiden and multiple DVTs years ago after prolonged drive in [address]7[place]  Had seen hematologist in the past  -On Chronic Coumadin, but has been having labile INRs  -Interested in switching, will check which NOAC is FDA approved for Factor V Leiden def as well as DVT and switch with his next INR check    Esophageal dysphagia  Pt had been on Ranitidine for GERD in the past, but given dysphagia and early satiety would recommend GI referral for EGD, he has seen Angelika in the past  Offerred to start [id number] or PPI prior to visit, but pt would like to wait until he is evaluated first     I have spent 25 minutes with Patient  today in which greater than 50% of this time was spent in counseling/coordination of care regarding Risks and benefits of tx options, Intructions for management, Patient and family education, Importance of treatment compliance and Impressions  SUBJECTIVE:  Bhanu Hamilton is a 66 y o  male who presents today with a chief complaint of Medication Problem (Discuss Eliquis)  Pt is here to discuss other anticoagulation options for his Factor V Leiden deficiency  He is currently on Warfarin, but his INR levels are labile and he doesn't like to get his blood checked so often  He checked with his new insurance company and Eliquis and Xarelto are more cost effective than Pradaxa  About 1 5 yers ago he went on Nutrisystem and has since been doing portion size control and had been doing well, but recently has been having more gas and has early satiety  If he overeats he gets dysphagia that can take 1-2 days to resolve  No n/v/change in BM/bleeding  Review of Systems   Constitutional: Positive for appetite change  Negative for fatigue  HENT: Positive for trouble swallowing      Respiratory: Negative for shortness of breath  Cardiovascular: Negative for chest pain  Gastrointestinal: Negative for abdominal pain, blood in stool, constipation, diarrhea, nausea and vomiting  I have reviewed the patient's PMH, Social History, Medication List and Allergies as appropriate  OBJECTIVE:  /80 (BP Location: Left arm, Patient Position: Sitting, Cuff Size: Standard)   Pulse 88   Temp 98 7 °F (37 1 °C)   Resp 16   Ht 5' 9" (1 753 m)   Wt 93 9 kg (207 lb)   BMI 30 57 kg/m²    Physical Exam   Constitutional: He appears well-developed and well-nourished  No distress  HENT:   Head: Normocephalic and atraumatic  Cardiovascular: Normal rate, regular rhythm and normal heart sounds  No murmur heard  Pulmonary/Chest: Effort normal and breath sounds normal  No respiratory distress  He has no wheezes  Abdominal: Soft  Normal appearance and bowel sounds are normal  He exhibits no distension  There is no tenderness  There is no rebound  Neurological: He is alert  Skin: He is not diaphoretic  Vitals reviewed  Stevie Contreras MD    Note: Portions of the record may have been created with voice recognition software  Occasional wrong word or "sound a like" substitutions may have occurred due to the inherent limitations of voice recognition software  Read the chart carefully and recognize, using context, where substitutions have occurred

## 2019-01-17 NOTE — ASSESSMENT & PLAN NOTE
History of [address] and multiple DVTs years ago after prolonged drive in [address]132/[address]  Had seen hematologist in the past  -On Chronic Coumadin, but has been having labile INRs  -Interested in switching, will check which NOAC is FDA approved for Factor V Leiden def as well as DVT and switch with his next INR check

## 2019-01-22 ENCOUNTER — TELEPHONE (OUTPATIENT)
Dept: FAMILY MEDICINE CLINIC | Facility: CLINIC | Age: 59
End: 2019-01-22

## 2019-01-22 LAB
INR PPP: 2.5
PROTHROMBIN TIME: 25.3 SEC (ref 9–11.5)

## 2019-01-22 NOTE — TELEPHONE ENCOUNTER
Call INR 2 5  Same dose of Warfarin  INR 4 weeks       Recent Results (from the past 336 hour(s))   Protime-INR    Collection Time: 01/22/19  7:14 AM   Result Value Ref Range    INR 2 5 (H)     Prothrombin Time 25 3 (H) 9 0 - 11 5 sec

## 2019-02-07 ENCOUNTER — TELEPHONE (OUTPATIENT)
Dept: FAMILY MEDICINE CLINIC | Facility: CLINIC | Age: 59
End: 2019-02-07

## 2019-02-07 DIAGNOSIS — D68.51 FACTOR V LEIDEN MUTATION (HCC): Primary | ICD-10-CM

## 2019-02-07 NOTE — TELEPHONE ENCOUNTER
I spoke with patient, he thought that his previous INR was acceptable to statrt the Xarelto  He couldn't take the number for Dr Ann Kawasaki, he was driving  I will send it to his my chart

## 2019-02-07 NOTE — TELEPHONE ENCOUNTER
I would like to switch him to Xarelto, I was waiting on his next INR, once he is in the appropriate range for his INR, then I can switch to Xarelto     I put in a referral for Dr Jolene Carvalho office, not sure why he wasn't contacted, can we give him their number and he can call them directly  Thanks

## 2019-02-07 NOTE — TELEPHONE ENCOUNTER
Patient called stating he was in about 3 weeks ago  He said it was discussed that you would let him know about alternatives for Warfarin  He said Eliquis and Xarelto was mentioned  Also stated that Dr Olivier Alvarez was supposed to be contacted from our office but he never heard anything back regarding an Endoscopy  Please advise  Patient would like a return call   21 [phone]

## 2019-02-07 NOTE — TELEPHONE ENCOUNTER
Because his INR's are sometimes labile, I'd like him to get his INR checked again one more time and if its in the range or closer to 2 0, he can start the Xarelto and stop warfarin  Ill send in the med, so that he has it on hand to start once his INR is acceptable

## 2019-02-19 ENCOUNTER — ANTICOAG VISIT (OUTPATIENT)
Dept: FAMILY MEDICINE CLINIC | Facility: CLINIC | Age: 59
End: 2019-02-19

## 2019-02-19 ENCOUNTER — TELEPHONE (OUTPATIENT)
Dept: FAMILY MEDICINE CLINIC | Facility: CLINIC | Age: 59
End: 2019-02-19

## 2019-02-19 LAB
INR PPP: 2
PROTHROMBIN TIME: 20.8 SEC (ref 9–11.5)

## 2019-02-19 NOTE — TELEPHONE ENCOUNTER
Spoke with patient, gave message  He said Dr Supa Price did order xarelto for him but he is "not committed" to taking it yet  He is going to stay on the warfarin for now and talk to her (more in-depth) at a later date

## 2019-02-25 DIAGNOSIS — I10 ESSENTIAL (PRIMARY) HYPERTENSION: ICD-10-CM

## 2019-02-25 RX ORDER — METOPROLOL SUCCINATE 50 MG/1
TABLET, EXTENDED RELEASE ORAL
Qty: 180 TABLET | Refills: 1 | Status: SHIPPED | OUTPATIENT
Start: 2019-02-25 | End: 2019-06-27 | Stop reason: SDUPTHER

## 2019-03-04 NOTE — TELEPHONE ENCOUNTER
There was some confusion from his mail order, so we were supposed to call him to see if there is a reason why his metoprolol succinate is 2x/day, normally it is a once a day medication unless there's some clinical reason  So, if there isn't, I'll change him to metoprolol succinate 100mg daily  I'll send it in to [facility] and mail order, once we confirm

## 2019-03-04 NOTE — TELEPHONE ENCOUNTER
Patient called stating he still hasn't received his prescription through mail order patient is asking if a script can be sent into [facility] 90 day supply  Today  because he is completely out  Please advise  Patient would like a return call  29-Sep-2018 18:27

## 2019-03-05 NOTE — TELEPHONE ENCOUNTER
I spoke with patient yesterday about this and he did not know why he was taking it twice a day  He said Dr Robin Winters and his cardiologist had wanted him to take it bid but he does not remember why

## 2019-03-05 NOTE — TELEPHONE ENCOUNTER
I believe there was a form from his insurance company rejecting the claim that I put in the clinical bin, please return that to me and I'll see what documentation they need

## 2019-03-05 NOTE — TELEPHONE ENCOUNTER
Patient sees [facility] Cardiology,   He was previously on 25 mg tartrate bid, he then had some issues and it was switched to 50 mg succinate bid and he has been well controlled since then  Will call Cardiology and check into this

## 2019-03-06 NOTE — TELEPHONE ENCOUNTER
Left message for patient to call office to inform him that prior-auth for Metoprolol Succinate ER 50 mg BID was approved and he can call [facility] to inform them to send medication

## 2019-03-19 ENCOUNTER — TELEPHONE (OUTPATIENT)
Dept: FAMILY MEDICINE CLINIC | Facility: CLINIC | Age: 59
End: 2019-03-19

## 2019-03-19 LAB
INR PPP: 2.1
PROTHROMBIN TIME: 21.4 SEC (ref 9–11.5)

## 2019-03-19 NOTE — TELEPHONE ENCOUNTER
----- Message from Norvel Lennox sent at 3/19/2019  5:52 PM EDT -----  Regarding: PT/INR      ----- Message -----  From: Sidney Flores Results In  Sent: 3/19/2019   4:16 PM  To: [address]

## 2019-03-20 ENCOUNTER — TELEPHONE (OUTPATIENT)
Dept: FAMILY MEDICINE CLINIC | Facility: CLINIC | Age: 59
End: 2019-03-20

## 2019-03-20 ENCOUNTER — ANTICOAG VISIT (OUTPATIENT)
Dept: FAMILY MEDICINE CLINIC | Facility: CLINIC | Age: 59
End: 2019-03-20

## 2019-03-20 DIAGNOSIS — D68.51 FACTOR V LEIDEN MUTATION (HCC): Primary | ICD-10-CM

## 2019-03-20 DIAGNOSIS — Z79.01 CHRONIC ANTICOAGULATION: ICD-10-CM

## 2019-04-19 LAB
INR PPP: 4.5
PROTHROMBIN TIME: 45.6 SEC (ref 9–11.5)

## 2019-04-22 ENCOUNTER — TELEPHONE (OUTPATIENT)
Dept: FAMILY MEDICINE CLINIC | Facility: CLINIC | Age: 59
End: 2019-04-22

## 2019-04-25 ENCOUNTER — TELEPHONE (OUTPATIENT)
Dept: FAMILY MEDICINE CLINIC | Facility: CLINIC | Age: 59
End: 2019-04-25

## 2019-05-03 LAB
INR PPP: 1.9
PROTHROMBIN TIME: 19.3 SEC (ref 9–11.5)

## 2019-05-06 ENCOUNTER — TELEPHONE (OUTPATIENT)
Dept: FAMILY MEDICINE CLINIC | Facility: CLINIC | Age: 59
End: 2019-05-06

## 2019-05-06 ENCOUNTER — ANTICOAG VISIT (OUTPATIENT)
Dept: FAMILY MEDICINE CLINIC | Facility: CLINIC | Age: 59
End: 2019-05-06

## 2019-05-17 LAB
INR PPP: 3.3
PROTHROMBIN TIME: 34 SEC (ref 9–11.5)

## 2019-05-20 ENCOUNTER — ANTICOAG VISIT (OUTPATIENT)
Dept: FAMILY MEDICINE CLINIC | Facility: CLINIC | Age: 59
End: 2019-05-20

## 2019-05-20 ENCOUNTER — TELEPHONE (OUTPATIENT)
Dept: FAMILY MEDICINE CLINIC | Facility: CLINIC | Age: 59
End: 2019-05-20

## 2019-05-21 ENCOUNTER — OFFICE VISIT (OUTPATIENT)
Dept: FAMILY MEDICINE CLINIC | Facility: CLINIC | Age: 59
End: 2019-05-21
Payer: COMMERCIAL

## 2019-05-21 VITALS
HEIGHT: 69 IN | BODY MASS INDEX: 30.21 KG/M2 | RESPIRATION RATE: 16 BRPM | HEART RATE: 62 BPM | TEMPERATURE: 98 F | WEIGHT: 204 LBS | DIASTOLIC BLOOD PRESSURE: 78 MMHG | SYSTOLIC BLOOD PRESSURE: 126 MMHG

## 2019-05-21 DIAGNOSIS — Z79.01 CHRONIC ANTICOAGULATION: ICD-10-CM

## 2019-05-21 DIAGNOSIS — E66.09 CLASS 1 OBESITY DUE TO EXCESS CALORIES WITH SERIOUS COMORBIDITY AND BODY MASS INDEX (BMI) OF 30.0 TO 30.9 IN ADULT: ICD-10-CM

## 2019-05-21 DIAGNOSIS — M1A.0710 CHRONIC IDIOPATHIC GOUT INVOLVING TOE OF RIGHT FOOT WITHOUT TOPHUS: ICD-10-CM

## 2019-05-21 DIAGNOSIS — D69.6 THROMBOCYTOPENIA (HCC): ICD-10-CM

## 2019-05-21 DIAGNOSIS — D68.51 FACTOR V LEIDEN MUTATION (HCC): Primary | ICD-10-CM

## 2019-05-21 DIAGNOSIS — E78.00 PURE HYPERCHOLESTEROLEMIA: ICD-10-CM

## 2019-05-21 DIAGNOSIS — D58.2 ABNORMAL HEMOGLOBIN (HGB) (HCC): ICD-10-CM

## 2019-05-21 DIAGNOSIS — I10 ESSENTIAL HYPERTENSION: ICD-10-CM

## 2019-05-21 DIAGNOSIS — E74.39 GLUCOSE INTOLERANCE: ICD-10-CM

## 2019-05-21 DIAGNOSIS — K76.0 FATTY LIVER: ICD-10-CM

## 2019-05-21 PROCEDURE — 99214 OFFICE O/P EST MOD 30 MIN: CPT | Performed by: FAMILY MEDICINE

## 2019-05-21 PROCEDURE — 3008F BODY MASS INDEX DOCD: CPT | Performed by: FAMILY MEDICINE

## 2019-05-21 PROCEDURE — 1036F TOBACCO NON-USER: CPT | Performed by: FAMILY MEDICINE

## 2019-05-21 PROCEDURE — 3078F DIAST BP <80 MM HG: CPT | Performed by: FAMILY MEDICINE

## 2019-05-21 PROCEDURE — 3074F SYST BP LT 130 MM HG: CPT | Performed by: FAMILY MEDICINE

## 2019-06-03 ENCOUNTER — TELEPHONE (OUTPATIENT)
Dept: FAMILY MEDICINE CLINIC | Facility: CLINIC | Age: 59
End: 2019-06-03

## 2019-06-03 LAB
INR PPP: 2.8
PROTHROMBIN TIME: 28.6 SEC (ref 9–11.5)
URATE SERPL-MCNC: 4.1 MG/DL (ref 4–8)

## 2019-06-04 ENCOUNTER — ANTICOAG VISIT (OUTPATIENT)
Dept: FAMILY MEDICINE CLINIC | Facility: CLINIC | Age: 59
End: 2019-06-04

## 2019-06-20 DIAGNOSIS — I10 ESSENTIAL (PRIMARY) HYPERTENSION: ICD-10-CM

## 2019-06-20 DIAGNOSIS — E79.0 HYPERURICEMIA: ICD-10-CM

## 2019-06-20 DIAGNOSIS — D68.51 FACTOR V LEIDEN (HCC): ICD-10-CM

## 2019-06-20 RX ORDER — ALLOPURINOL 300 MG/1
TABLET ORAL
Qty: 90 TABLET | Refills: 1 | Status: SHIPPED | OUTPATIENT
Start: 2019-06-20 | End: 2019-12-20 | Stop reason: SDUPTHER

## 2019-06-20 RX ORDER — LISINOPRIL 20 MG/1
TABLET ORAL
Qty: 90 TABLET | Refills: 1 | Status: SHIPPED | OUTPATIENT
Start: 2019-06-20 | End: 2019-12-20 | Stop reason: SDUPTHER

## 2019-06-20 RX ORDER — WARFARIN SODIUM 4 MG/1
TABLET ORAL
Qty: 90 TABLET | Refills: 1 | Status: SHIPPED | OUTPATIENT
Start: 2019-06-20 | End: 2019-08-08

## 2019-06-27 DIAGNOSIS — I10 ESSENTIAL (PRIMARY) HYPERTENSION: ICD-10-CM

## 2019-06-27 RX ORDER — METOPROLOL SUCCINATE 50 MG/1
TABLET, EXTENDED RELEASE ORAL
Qty: 20 TABLET | Refills: 0 | Status: SHIPPED | OUTPATIENT
Start: 2019-06-27 | End: 2019-10-13 | Stop reason: SDUPTHER

## 2019-06-28 LAB
INR PPP: 1.7
PROTHROMBIN TIME: 17.5 SEC (ref 9–11.5)

## 2019-07-01 ENCOUNTER — ANTICOAG VISIT (OUTPATIENT)
Dept: FAMILY MEDICINE CLINIC | Facility: CLINIC | Age: 59
End: 2019-07-01

## 2019-07-01 ENCOUNTER — TELEPHONE (OUTPATIENT)
Dept: FAMILY MEDICINE CLINIC | Facility: CLINIC | Age: 59
End: 2019-07-01

## 2019-07-01 LAB — INR PPP: 1.7 (ref 0.84–1.19)

## 2019-07-01 NOTE — TELEPHONE ENCOUNTER
Patient informed----- Message from Nolberto Fair MD sent at 6/28/2019  5:11 PM EDT -----  INR reviewed, not quite at goal, but since his INR fluctuates, continue current dose, recheck in 1 month

## 2019-07-01 NOTE — TELEPHONE ENCOUNTER
Left detailed message for patient in regards to INR and updated anti coag track, advised to call back with any questions

## 2019-07-30 LAB
INR PPP: 2.1
PROTHROMBIN TIME: 21 SEC (ref 9–11.5)

## 2019-07-31 ENCOUNTER — ANTICOAG VISIT (OUTPATIENT)
Dept: FAMILY MEDICINE CLINIC | Facility: CLINIC | Age: 59
End: 2019-07-31

## 2019-08-08 ENCOUNTER — TELEPHONE (OUTPATIENT)
Dept: FAMILY MEDICINE CLINIC | Facility: CLINIC | Age: 59
End: 2019-08-08

## 2019-08-08 DIAGNOSIS — D68.51 FACTOR V LEIDEN MUTATION (HCC): ICD-10-CM

## 2019-08-08 NOTE — TELEPHONE ENCOUNTER
Patient called stating in his last office visit it was discussed to changed his prescription for Warfarin to Xarelto  Patient would like to know if there will be a change and if so what his instructions would be  He stated he had blood work done at [address] [place] [address] last week  I let patient know  I did not see any results in Chart but he said they were sent  Please advise   Patient would like a return call 21 [phone] Progress Notes by Sarah Izaguirre MD at 06/02/17 02:29 PM     Author:  Sarah Izaguirre MD Service:  (none) Author Type:  Physician     Filed:  06/02/17 02:31 PM Encounter Date:  6/2/2017 Status:  Signed     :  Sarah Izaguirre MD (Physician)            Cyndi Moss is a 50year old male who presents for a possible infection to the[KW1.1T] 1[KW1.1M] digit,[KW1.1T] left foot[KW1.1M]. He has had some redness, pain and swelling for the last[KW1.1T] few[KW1.1M] days. He[KW1.1T] has not[KW1.1M] had this before. He is otherwise asymptomatic. Trauma:[KW1.1T] no[KW1.1M]  Fevers:[KW1.1T]  Felt warm[KW1.1M]  Discharge:[KW1.1T] no[KW1.1M]    PMH:  see chart   Alg:[KW1.1T]     Clarithromycin and Sulfa antibiotics[KW1.2T]   Meds: see chart  Tobacco Use:[KW1.1T] none[KW1.1M]     OBJECTIVE:  /90  Pulse 90  Temp 98 Â°F (36.7 Â°C) (Tympanic)   Resp 16  SpO2 96%  Alert, active, cooperative male in no apparent distress. Lungs: Clear to auscultation; good air entry         no use of accessory muscles  CV:    regular rate and rhythm; no murmurs[KW1.1T]  1[KW1.1M]D[KW1.1T]LF[KW1.1M]:[KW1.1T] paronychia rpesent with purulent collection              FROM;[KW1.1M] neurovascular intact[KW1.1T];[KW1.1M] No erythematous streaks              Nail bed and nail plate are normal and without injury     Procedure:  After discussing possible risks and benefits, and with patient consent, the area was cleaned and prepped in usual fashion.[KW1.1T]  Purulent material expressed. [KW1.1M]  Wound was dressed. Pt tolerated this well. ASSESSMENT:/PLAN:  Paronychia    I discussed this with[KW1.1T] him.[KW1.1M]  Recommended OTC symptomatic treatments.[KW1.1T]  Keflex prescribed[KW1.1M]  Common medication side effects discussed. [KW1.1T]   F/U prn over berta next few days.[KW1.1M]  The patient indicates understanding of these issues and agrees with the plan.     Electronically Signed by:    Sarah Cao MD , 6/2/2017[KW1.1T]        Revision History        User Key Date/Time User Provider Type Action    > KW1.2 06/02/17 02:31 PM Tres Saunders MD Physician Sign     KW1.1 06/02/17 02:29 PM Tres Saunders MD Physician     M - Manual, T - Template

## 2019-08-08 NOTE — TELEPHONE ENCOUNTER
Rebel, I see his INR from last week, whenever he is ready to switch, he would just stop the warfarin and the next day start the Xarelto 20mg  He would not need repeat lab testing  I sent the Xarelto in before, but let me know if he needs another refill

## 2019-08-15 ENCOUNTER — OFFICE VISIT (OUTPATIENT)
Dept: FAMILY MEDICINE CLINIC | Facility: CLINIC | Age: 59
End: 2019-08-15
Payer: COMMERCIAL

## 2019-08-15 VITALS
TEMPERATURE: 98.2 F | BODY MASS INDEX: 29.39 KG/M2 | SYSTOLIC BLOOD PRESSURE: 122 MMHG | RESPIRATION RATE: 16 BRPM | DIASTOLIC BLOOD PRESSURE: 86 MMHG | HEART RATE: 70 BPM | WEIGHT: 199 LBS | OXYGEN SATURATION: 96 %

## 2019-08-15 DIAGNOSIS — A08.4 VIRAL GASTROENTERITIS: Primary | ICD-10-CM

## 2019-08-15 PROCEDURE — 1036F TOBACCO NON-USER: CPT | Performed by: FAMILY MEDICINE

## 2019-08-15 PROCEDURE — 99213 OFFICE O/P EST LOW 20 MIN: CPT | Performed by: FAMILY MEDICINE

## 2019-08-15 NOTE — PROGRESS NOTES
Assessment/Plan:         Diagnoses and all orders for this visit:    Viral gastroenteritis        Clear liquids advancing to light diet as tolerated  P r n  Imodium  Avoid milk or dairy products for 5 days  Recheck as needed call if any changes     Patient ID: Josselin Chen is a 66 y o  male  Patient presents with acute onset of diarrhea on 8/12  No nausea or vomiting  One day of chills  no documented fever  No rectal bleeding  No recent travel  No antibiotic use  Decreased appetite tolerating liquids        The following portions of the patient's history were reviewed and updated as appropriate: allergies, current medications, past family history, past medical history, past social history, past surgical history and problem list     Review of Systems   Constitutional: Positive for unexpected weight change  See HPI 5 lb weight loss from 05/2019   HENT: Negative for congestion, ear pain, rhinorrhea and sore throat  Respiratory: Negative for cough, shortness of breath and wheezing  Cardiovascular: Negative for chest pain and palpitations  Gastrointestinal: Negative for abdominal pain  See HPI   Genitourinary: Negative for difficulty urinating  Skin: Negative for rash  Neurological: Negative for dizziness and headaches  Objective:      /86   Pulse 70   Temp 98 2 °F (36 8 °C)   Resp 16   Wt 90 3 kg (199 lb)   SpO2 96%   BMI 29 39 kg/m²          Physical Exam   Constitutional: No distress  HENT:   Right Ear: Tympanic membrane normal    Left Ear: Tympanic membrane normal    Mouth/Throat: Oropharynx is clear and moist and mucous membranes are normal  No oral lesions  Eyes: No scleral icterus  Neck: Neck supple  Cardiovascular: Normal rate and regular rhythm  Exam reveals no gallop  No murmur heard  Pulmonary/Chest: Effort normal and breath sounds normal  He has no wheezes  He has no rales  Abdominal: Soft   Bowel sounds are normal  He exhibits no distension and no mass  There is no hepatosplenomegaly  There is no tenderness  There is no rebound and no guarding  Lymphadenopathy:     He has no cervical adenopathy  Skin: Skin is warm and dry  No rash noted

## 2019-09-04 ENCOUNTER — TELEPHONE (OUTPATIENT)
Dept: FAMILY MEDICINE CLINIC | Facility: CLINIC | Age: 59
End: 2019-09-04

## 2019-10-04 ENCOUNTER — ANTICOAG VISIT (OUTPATIENT)
Dept: FAMILY MEDICINE CLINIC | Facility: CLINIC | Age: 59
End: 2019-10-04

## 2019-10-13 DIAGNOSIS — I10 ESSENTIAL (PRIMARY) HYPERTENSION: ICD-10-CM

## 2019-10-13 NOTE — TELEPHONE ENCOUNTER
Patient needs a 90 day refill requested [facility]

## 2019-10-14 ENCOUNTER — TELEPHONE (OUTPATIENT)
Dept: FAMILY MEDICINE CLINIC | Facility: CLINIC | Age: 59
End: 2019-10-14

## 2019-10-14 DIAGNOSIS — I10 ESSENTIAL (PRIMARY) HYPERTENSION: ICD-10-CM

## 2019-10-14 RX ORDER — METOPROLOL SUCCINATE 50 MG/1
TABLET, EXTENDED RELEASE ORAL
Qty: 20 TABLET | Refills: 0 | Status: SHIPPED | OUTPATIENT
Start: 2019-10-14 | End: 2019-12-31 | Stop reason: SDUPTHER

## 2019-10-14 RX ORDER — METOPROLOL SUCCINATE 50 MG/1
TABLET, EXTENDED RELEASE ORAL
Qty: 180 TABLET | Refills: 1 | Status: SHIPPED | OUTPATIENT
Start: 2019-10-14 | End: 2019-10-14 | Stop reason: SDUPTHER

## 2019-10-14 NOTE — TELEPHONE ENCOUNTER
Patient called stating he is completely out of Metoprolol and is requesting a refill to hold him out until the mail service order comes in for him  Please advise  Patient would like a return call   [facility] [place]

## 2019-10-14 NOTE — TELEPHONE ENCOUNTER
I called and left patient a VM that a 15 pill metoprolol refill will be sent to [facility] to hold him over for the mail order to arrive

## 2019-11-22 ENCOUNTER — OFFICE VISIT (OUTPATIENT)
Dept: FAMILY MEDICINE CLINIC | Facility: CLINIC | Age: 59
End: 2019-11-22
Payer: COMMERCIAL

## 2019-11-22 VITALS
DIASTOLIC BLOOD PRESSURE: 72 MMHG | SYSTOLIC BLOOD PRESSURE: 116 MMHG | HEART RATE: 80 BPM | TEMPERATURE: 97.5 F | RESPIRATION RATE: 16 BRPM | WEIGHT: 205 LBS | BODY MASS INDEX: 30.27 KG/M2

## 2019-11-22 DIAGNOSIS — E74.39 GLUCOSE INTOLERANCE: ICD-10-CM

## 2019-11-22 DIAGNOSIS — Z11.4 ENCOUNTER FOR SCREENING FOR HIV: ICD-10-CM

## 2019-11-22 DIAGNOSIS — D58.2 ABNORMAL HEMOGLOBIN (HGB) (HCC): ICD-10-CM

## 2019-11-22 DIAGNOSIS — E78.00 PURE HYPERCHOLESTEROLEMIA: ICD-10-CM

## 2019-11-22 DIAGNOSIS — D68.51 FACTOR V LEIDEN MUTATION (HCC): ICD-10-CM

## 2019-11-22 DIAGNOSIS — R79.89 ELEVATED LFTS: ICD-10-CM

## 2019-11-22 DIAGNOSIS — Z23 NEED FOR INFLUENZA VACCINATION: ICD-10-CM

## 2019-11-22 DIAGNOSIS — D69.6 THROMBOCYTOPENIA (HCC): ICD-10-CM

## 2019-11-22 DIAGNOSIS — I10 ESSENTIAL HYPERTENSION: Primary | ICD-10-CM

## 2019-11-22 DIAGNOSIS — M1A.0710 CHRONIC IDIOPATHIC GOUT INVOLVING TOE OF RIGHT FOOT WITHOUT TOPHUS: ICD-10-CM

## 2019-11-22 DIAGNOSIS — Z11.59 ENCOUNTER FOR HEPATITIS C SCREENING TEST FOR LOW RISK PATIENT: ICD-10-CM

## 2019-11-22 DIAGNOSIS — Z01.84 IMMUNITY STATUS TESTING: ICD-10-CM

## 2019-11-22 DIAGNOSIS — K76.0 FATTY LIVER: ICD-10-CM

## 2019-11-22 PROCEDURE — 90682 RIV4 VACC RECOMBINANT DNA IM: CPT

## 2019-11-22 PROCEDURE — 99214 OFFICE O/P EST MOD 30 MIN: CPT | Performed by: FAMILY MEDICINE

## 2019-11-22 PROCEDURE — 1036F TOBACCO NON-USER: CPT | Performed by: FAMILY MEDICINE

## 2019-11-22 PROCEDURE — 90471 IMMUNIZATION ADMIN: CPT

## 2019-11-22 NOTE — ASSESSMENT & PLAN NOTE
Lab Results   Component Value Date    WBC 7 7 11/20/2018    HGB 18 7 (H) 11/20/2018    HCT 51 4 (H) 11/20/2018    MCV 94 7 11/20/2018     (L) 11/20/2018     Extensive work up with Hematology in the past, likely idiopathic per records  Recheck CBC

## 2019-11-22 NOTE — ASSESSMENT & PLAN NOTE
History of Factory V Leiden and multiple DVTs years ago after prolonged drive in 0930/9142  Had seen hematologist in the past (Dr Zechariah James)  No longer on coumadin, had labile and difficult to control INRs  Doing well now with Xarelto 20mg daily

## 2019-11-22 NOTE — PROGRESS NOTES
FAMILY MEDICINE PROGRESS NOTE  Jaida Sosa 64 y o  male   DATE: November 22, 2019     ASSESSMENT and PLAN:  Jaida Sosa is a 64 y o  male with:     Fatty liver  Lab Results   Component Value Date    ALT 50 (H) 11/20/2018    AST 39 (H) 11/20/2018    GGT 88 06/17/2016    ALKPHOS 61 11/20/2018    BILITOT 0 9 12/01/2017 April 2019 (AST-45, ALT-49)  Reviewed healthy diet and weight loss  Recheck CMP with hepatitis labs and HIV    Factor V Leiden mutation ([facility] [address] )  History of Factory V Leiden and multiple DVTs years ago after prolonged drive in 3669/7061  Had seen hematologist in the past (Dr Alejandra Habermann)  No longer on coumadin, had labile and difficult to control INRs  Doing well now with Xarelto 20mg daily    Essential hypertension  Had seen Cardiology (Dr David Abreu) in the past, but was having palpitations with once daily Metoprolol succinate, so now is on Metoprolol succinate 50mg BID without symptoms  BP Readings from Last 3 Encounters:   11/22/19 116/72   08/15/19 122/86   05/21/19 126/78     Lab Results   Component Value Date    CREATININE 1 01 11/20/2018    EGFR >60 0 12/01/2016     Controlled on current regimen:  -Lisinopril 20mg and Metoprolol succinate 50mg BID    Chronic idiopathic gout involving toe of right foot without tophus  Lab Results   Component Value Date    URICACID 4 1 06/03/2019   No recent flares, well controlled with Allopurinol 300mg daily    [facility]  Lab Results   Component Value Date    WBC 7 7 11/20/2018    HGB 18 7 (H) 11/20/2018    HCT 51 4 (H) 11/20/2018    MCV 94 7 11/20/2018     (L) 11/20/2018     Extensive work up with Hematology in the past, likely idiopathic per records  Recheck CBC    Glucose intolerance  Recheck A1c, recommend low carb and weight loss    Pure hypercholesterolemia  Lab Results   Component Value Date    CHOLESTEROL 184 11/20/2018    HDL 61 11/20/2018    LDLC 106 (H) 11/20/2018    TRIG 80 11/20/2018     The 10-year ASCVD risk score (Janelle Mayer et al , 2013) is: 5 1%    Values used to calculate the score:      Age: 64 years      Sex: Male      Is Non- : No      Diabetic: No      Tobacco smoker: No      Systolic Blood Pressure: 839 mmHg      Is BP treated: Yes      HDL Cholesterol: 61 mg/dL      Total Cholesterol: 184 mg/dL    Reviewed healthy, low cholesterol diet  Recheck FLP    Pt requesting measles titer, ordered, check with insurance about coverage    SUBJECTIVE:  Daphnie Ariza is a 64 y o  male who presents today with a chief complaint of Follow-up  Daphnie Ariza is here for a 6 month follow-up, he did not have labs done prior to this visit  The active chronic medical problems and medications are as below:   1  HTN with palpitations- previously saw cardiology, now controlled with Lisinopril and Metoprolol  2  Factor V mutation with history of DVTs, no recent DVTs-pat had been on coumadin for years, has switched to Xarelto since our last visit, no excessive bleeding  3  Gout- no recent exacerbations, on allopurinol    Review of Systems   Eyes: Negative for visual disturbance  Respiratory: Negative for shortness of breath  Cardiovascular: Negative for chest pain and palpitations  Neurological: Negative for dizziness and light-headedness  Hematological: Does not bruise/bleed easily  I have reviewed the patient's Past Medical History      Current Outpatient Medications:     allopurinol (ZYLOPRIM) 300 mg tablet, TAKE 1 TABLET DAILY, Disp: 90 tablet, Rfl: 1    lisinopril (ZESTRIL) 20 mg tablet, TAKE 1 TABLET DAILY, Disp: 90 tablet, Rfl: 1    metoprolol succinate (TOPROL-XL) 50 mg 24 hr tablet, TAKE 1 TABLET BY MOUTH TWICE DAILY, Disp: 20 tablet, Rfl: 0    rivaroxaban (XARELTO) 20 mg tablet, Take 1 tablet (20 mg total) by mouth daily with breakfast, Disp: 30 tablet, Rfl: 5    OBJECTIVE:  /72   Pulse 80   Temp 97 5 °F (36 4 °C)   Resp 16   Wt 120 kg (265 lb)   BMI 39 13 kg/m²    Physical Exam Constitutional: He is oriented to person, place, and time  He appears well-developed and well-nourished  No distress  Cardiovascular: Normal rate, regular rhythm and normal heart sounds  No murmur heard  Pulmonary/Chest: Effort normal and breath sounds normal  No respiratory distress  He has no wheezes  He has no rales  Neurological: He is alert and oriented to person, place, and time  Skin: He is not diaphoretic  Psychiatric: He has a normal mood and affect  Vitals reviewed  BMI Counseling: Body mass index is 39 13 kg/m²  The BMI is above normal  Nutrition recommendations include encouraging healthy choices of fruits and vegetables  Brenda Connell MD    Note: Portions of the record have been created with voice recognition software  Occasional wrong word or "sound a like" substitutions may have occurred due to the inherent limitations of voice recognition software  Read the chart carefully and recognize, using context, where substitutions have occurred

## 2019-11-22 NOTE — ASSESSMENT & PLAN NOTE
Lab Results   Component Value Date    URICACID 4 1 06/03/2019   No recent flares, well controlled with Allopurinol 300mg daily

## 2019-11-22 NOTE — ASSESSMENT & PLAN NOTE
Lab Results   Component Value Date    CHOLESTEROL 184 11/20/2018    HDL 61 11/20/2018    LDLC 106 (H) 11/20/2018    TRIG 80 11/20/2018     The 10-year ASCVD risk score (Sebastian Zhou et al , 2013) is: 5 1%    Values used to calculate the score:      Age: 64 years      Sex: Male      Is Non- : No      Diabetic: No      Tobacco smoker: No      Systolic Blood Pressure: 144 mmHg      Is BP treated: Yes      HDL Cholesterol: 61 mg/dL      Total Cholesterol: 184 mg/dL    Reviewed healthy, low cholesterol diet  Recheck FLP

## 2019-11-22 NOTE — ASSESSMENT & PLAN NOTE
Lab Results   Component Value Date    ALT 50 (H) 11/20/2018    AST 39 (H) 11/20/2018    GGT 88 06/17/2016    ALKPHOS 61 11/20/2018    BILITOT 0 9 12/01/2017 April 2019 (AST-45, ALT-49)  Reviewed healthy diet and weight loss  Recheck CMP with hepatitis labs and HIV

## 2019-11-22 NOTE — ASSESSMENT & PLAN NOTE
Had seen Cardiology (Dr Sandy Jimenez) in the past, but was having palpitations with once daily Metoprolol succinate, so now is on Metoprolol succinate 50mg BID without symptoms  BP Readings from Last 3 Encounters:   11/22/19 116/72   08/15/19 122/86   05/21/19 126/78     Lab Results   Component Value Date    CREATININE 1 01 11/20/2018    EGFR >60 0 12/01/2016     Controlled on current regimen:  -Lisinopril 20mg and Metoprolol succinate 50mg BID

## 2019-12-20 DIAGNOSIS — I10 ESSENTIAL (PRIMARY) HYPERTENSION: ICD-10-CM

## 2019-12-20 DIAGNOSIS — E79.0 HYPERURICEMIA: ICD-10-CM

## 2019-12-20 RX ORDER — ALLOPURINOL 300 MG/1
300 TABLET ORAL DAILY
Qty: 90 TABLET | Refills: 1 | Status: SHIPPED | OUTPATIENT
Start: 2019-12-20 | End: 2020-07-09

## 2019-12-20 RX ORDER — LISINOPRIL 20 MG/1
20 TABLET ORAL DAILY
Qty: 90 TABLET | Refills: 1 | Status: SHIPPED | OUTPATIENT
Start: 2019-12-20 | End: 2020-07-09

## 2019-12-23 LAB
ALBUMIN SERPL-MCNC: 4.2 G/DL (ref 3.6–5.1)
ALBUMIN/GLOB SERPL: 1.9 (CALC) (ref 1–2.5)
ALP SERPL-CCNC: 64 U/L (ref 40–115)
ALT SERPL-CCNC: 60 U/L (ref 9–46)
AST SERPL-CCNC: 54 U/L (ref 10–35)
BASOPHILS # BLD AUTO: 77 CELLS/UL (ref 0–200)
BASOPHILS NFR BLD AUTO: 1 %
BILIRUB SERPL-MCNC: 0.9 MG/DL (ref 0.2–1.2)
BUN SERPL-MCNC: 11 MG/DL (ref 7–25)
BUN/CREAT SERPL: ABNORMAL (CALC) (ref 6–22)
CALCIUM SERPL-MCNC: 9.1 MG/DL (ref 8.6–10.3)
CHLORIDE SERPL-SCNC: 103 MMOL/L (ref 98–110)
CHOLEST SERPL-MCNC: 174 MG/DL
CHOLEST/HDLC SERPL: 3.2 (CALC)
CO2 SERPL-SCNC: 30 MMOL/L (ref 20–32)
CREAT SERPL-MCNC: 1.02 MG/DL (ref 0.7–1.33)
EOSINOPHIL # BLD AUTO: 331 CELLS/UL (ref 15–500)
EOSINOPHIL NFR BLD AUTO: 4.3 %
ERYTHROCYTE [DISTWIDTH] IN BLOOD BY AUTOMATED COUNT: 12.6 % (ref 11–15)
GLOBULIN SER CALC-MCNC: 2.2 G/DL (CALC) (ref 1.9–3.7)
GLUCOSE SERPL-MCNC: 101 MG/DL (ref 65–99)
HAV IGM SERPL QL IA: NORMAL
HBA1C MFR BLD: 5.1 % OF TOTAL HGB
HBV CORE IGM SERPL QL IA: NORMAL
HBV SURFACE AG SERPL QL IA: NORMAL
HCT VFR BLD AUTO: 49.9 % (ref 38.5–50)
HCV AB S/CO SERPL IA: 0.01
HCV AB S/CO SERPL IA: 0.01
HCV AB SERPL QL IA: NORMAL
HCV AB SERPL QL IA: NORMAL
HDLC SERPL-MCNC: 55 MG/DL
HGB BLD-MCNC: 18 G/DL (ref 13.2–17.1)
HIV 1+2 AB+HIV1 P24 AG SERPL QL IA: NORMAL
LDLC SERPL CALC-MCNC: 101 MG/DL (CALC)
LYMPHOCYTES # BLD AUTO: 2118 CELLS/UL (ref 850–3900)
LYMPHOCYTES NFR BLD AUTO: 27.5 %
MCH RBC QN AUTO: 34.7 PG (ref 27–33)
MCHC RBC AUTO-ENTMCNC: 36.1 G/DL (ref 32–36)
MCV RBC AUTO: 96.1 FL (ref 80–100)
MONOCYTES # BLD AUTO: 524 CELLS/UL (ref 200–950)
MONOCYTES NFR BLD AUTO: 6.8 %
NEUTROPHILS # BLD AUTO: 4651 CELLS/UL (ref 1500–7800)
NEUTROPHILS NFR BLD AUTO: 60.4 %
NONHDLC SERPL-MCNC: 119 MG/DL (CALC)
PLATELET # BLD AUTO: 125 THOUSAND/UL (ref 140–400)
PMV BLD REES-ECKER: 11.1 FL (ref 7.5–12.5)
POTASSIUM SERPL-SCNC: 4.1 MMOL/L (ref 3.5–5.3)
PROT SERPL-MCNC: 6.4 G/DL (ref 6.1–8.1)
RBC # BLD AUTO: 5.19 MILLION/UL (ref 4.2–5.8)
SL AMB EGFR AFRICAN AMERICAN: 94 ML/MIN/1.73M2
SL AMB EGFR NON AFRICAN AMERICAN: 81 ML/MIN/1.73M2
SODIUM SERPL-SCNC: 140 MMOL/L (ref 135–146)
TRIGL SERPL-MCNC: 86 MG/DL
URATE SERPL-MCNC: 3.8 MG/DL (ref 4–8)
WBC # BLD AUTO: 7.7 THOUSAND/UL (ref 3.8–10.8)

## 2019-12-31 DIAGNOSIS — D68.51 FACTOR V LEIDEN MUTATION (HCC): ICD-10-CM

## 2019-12-31 DIAGNOSIS — I10 ESSENTIAL (PRIMARY) HYPERTENSION: ICD-10-CM

## 2019-12-31 RX ORDER — METOPROLOL SUCCINATE 50 MG/1
TABLET, EXTENDED RELEASE ORAL
Qty: 30 TABLET | Refills: 3 | Status: SHIPPED | OUTPATIENT
Start: 2019-12-31 | End: 2020-01-16 | Stop reason: SDUPTHER

## 2019-12-31 NOTE — TELEPHONE ENCOUNTER
Patient called requesting refill Metoprolol 50 mg  30 day with refills and Xarelto 20 mg 30 day with refills   Patient would like it to go to [facility]

## 2020-01-16 DIAGNOSIS — I10 ESSENTIAL (PRIMARY) HYPERTENSION: ICD-10-CM

## 2020-01-16 DIAGNOSIS — D68.51 FACTOR V LEIDEN MUTATION (HCC): ICD-10-CM

## 2020-01-16 RX ORDER — METOPROLOL SUCCINATE 50 MG/1
TABLET, EXTENDED RELEASE ORAL
Qty: 180 TABLET | Refills: 1 | Status: SHIPPED | OUTPATIENT
Start: 2020-01-16 | End: 2020-06-24

## 2020-01-16 NOTE — TELEPHONE ENCOUNTER
Patient called and stated his mail order changed because of his insurance  It is now [facility]  I did add it to his chart   He is requesting for the Xarelto 20 mg 30 day with refills and Metoprolol 50 mg 30 day with refills to be sent to [email or web address]

## 2020-03-12 ENCOUNTER — OFFICE VISIT (OUTPATIENT)
Dept: FAMILY MEDICINE CLINIC | Facility: CLINIC | Age: 60
End: 2020-03-12
Payer: COMMERCIAL

## 2020-03-12 VITALS
RESPIRATION RATE: 16 BRPM | WEIGHT: 202 LBS | SYSTOLIC BLOOD PRESSURE: 132 MMHG | BODY MASS INDEX: 29.92 KG/M2 | HEIGHT: 69 IN | HEART RATE: 74 BPM | DIASTOLIC BLOOD PRESSURE: 84 MMHG | TEMPERATURE: 97.3 F | OXYGEN SATURATION: 96 %

## 2020-03-12 DIAGNOSIS — K22.2 SCHATZKI'S RING OF DISTAL ESOPHAGUS: Primary | ICD-10-CM

## 2020-03-12 DIAGNOSIS — R13.19 ESOPHAGEAL DYSPHAGIA: ICD-10-CM

## 2020-03-12 DIAGNOSIS — R80.8 OTHER PROTEINURIA: ICD-10-CM

## 2020-03-12 DIAGNOSIS — R74.01 TRANSAMINITIS: ICD-10-CM

## 2020-03-12 DIAGNOSIS — Z12.11 COLON CANCER SCREENING: ICD-10-CM

## 2020-03-12 DIAGNOSIS — I10 ESSENTIAL HYPERTENSION: ICD-10-CM

## 2020-03-12 DIAGNOSIS — K76.0 HEPATIC STEATOSIS: ICD-10-CM

## 2020-03-12 LAB
CREAT UR-MCNC: 163 MG/DL
MICROALBUMIN UR-MCNC: 16.6 MG/L (ref 0–20)
MICROALBUMIN/CREAT 24H UR: 10 MG/G CREATININE (ref 0–30)
SL AMB  POCT GLUCOSE, UA: ABNORMAL
SL AMB LEUKOCYTE ESTERASE,UA: ABNORMAL
SL AMB POCT BILIRUBIN,UA: ABNORMAL
SL AMB POCT BLOOD,UA: ABNORMAL
SL AMB POCT CLARITY,UA: CLEAR
SL AMB POCT COLOR,UA: ABNORMAL
SL AMB POCT KETONES,UA: ABNORMAL
SL AMB POCT NITRITE,UA: ABNORMAL
SL AMB POCT PH,UA: 6
SL AMB POCT SPECIFIC GRAVITY,UA: 1.01
SL AMB POCT URINE PROTEIN: ABNORMAL
SL AMB POCT UROBILINOGEN: ABNORMAL

## 2020-03-12 PROCEDURE — 81002 URINALYSIS NONAUTO W/O SCOPE: CPT | Performed by: FAMILY MEDICINE

## 2020-03-12 PROCEDURE — 99214 OFFICE O/P EST MOD 30 MIN: CPT | Performed by: FAMILY MEDICINE

## 2020-03-12 PROCEDURE — 3075F SYST BP GE 130 - 139MM HG: CPT | Performed by: FAMILY MEDICINE

## 2020-03-12 PROCEDURE — 1036F TOBACCO NON-USER: CPT | Performed by: FAMILY MEDICINE

## 2020-03-12 PROCEDURE — 82570 ASSAY OF URINE CREATININE: CPT | Performed by: FAMILY MEDICINE

## 2020-03-12 PROCEDURE — 3008F BODY MASS INDEX DOCD: CPT | Performed by: FAMILY MEDICINE

## 2020-03-12 PROCEDURE — 82043 UR ALBUMIN QUANTITATIVE: CPT | Performed by: FAMILY MEDICINE

## 2020-03-12 PROCEDURE — 3079F DIAST BP 80-89 MM HG: CPT | Performed by: FAMILY MEDICINE

## 2020-03-12 RX ORDER — OMEPRAZOLE 20 MG/1
20 CAPSULE, DELAYED RELEASE ORAL DAILY
Qty: 30 CAPSULE | Refills: 0 | Status: SHIPPED | OUTPATIENT
Start: 2020-03-12 | End: 2020-04-06

## 2020-03-12 NOTE — PROGRESS NOTES
FAMILY MEDICINE PROGRESS NOTE  Kindra Jackson 64 y o  male   DATE: March 12, 2020     ASSESSMENT and PLAN:  Kindra Jackson is a 64 y o  male with:     1  Schatzki's ring of distal esophagus  Symptoms c/w possible recurrence of ring, refer to GI for possible EGD with dilation  In the meantime restart 1 month course of Omeprazole 20mg daily  - omeprazole (PriLOSEC) 20 mg delayed release capsule; Take 1 capsule (20 mg total) by mouth daily  Dispense: 30 capsule; Refill: 0    2  Essential hypertension  BP Readings from Last 3 Encounters:   03/12/20 132/84   11/22/19 116/72   08/15/19 122/86     Controlled on current meds    3  Esophageal dysphagia    4  Hepatic steatosis  Check liver US prior to follow up given increased abdominal bloating   - US elastography; Future    5  Transaminitis  Lab Results   Component Value Date    ALT 60 (H) 12/20/2019    AST 54 (H) 12/20/2019    GGT 88 06/17/2016    ALKPHOS 64 12/20/2019    BILITOT 0 9 12/01/2017   Plan to repeat with work physical and f/u afterwards to review results  - US elastography; Future    6  Other proteinuria  Advised patient no hematuria on urine dip, can continue anticoagulation, but did have proteinuria, so given HTN, check microalbuminuria  - POCT urine dip  - Microalbumin / creatinine urine ratio    7  Colon cancer screening  - Ambulatory referral to Gastroenterology; Future    Patient agreeable with the plan and expressed understanding  I discucssed signs and symptoms for which to RTC, go to ER or seek urgent medical care  SUBJECTIVE:  Kindra Jackson is a 64 y o  male who presents today with a chief complaint of Urinary Tract Infection  Pt here for a feeling of "sick stomach," bloating, and chills, usually lasts a few days about once a montt for the past few months  Had EGD 2/11/19 with Dr Félix Gamino and found to Etta ring and did require dilation  Did take Omeprazole 20mg daily x 60 days and it resolved his symptoms at that time   But similar symptoms have now recurred  He also has been not eating too much and has darker urine  He is worried about blood in his urine due to being on Eliquis  Denies hematuria, dysuria, urinary frequency, foul urine odor, fever  Pt has a history of elevated LFTs, presumed fatty liver, seen on CT in 2016  Negative hepatitis work up 3-4 months ago  Review of Systems   HENT: Positive for trouble swallowing  Gastrointestinal: Positive for abdominal distention and abdominal pain  Negative for constipation, diarrhea, nausea and vomiting  I have reviewed the patient's Past Medical History  OBJECTIVE:  /84   Pulse 74   Temp (!) 97 3 °F (36 3 °C)   Resp 16   Ht 5' 9" (1 753 m)   Wt 91 6 kg (202 lb)   SpO2 96%   BMI 29 83 kg/m²    Physical Exam   Constitutional: He is oriented to person, place, and time  He appears well-developed and well-nourished  No distress  HENT:   Head: Normocephalic and atraumatic  Cardiovascular: Normal rate, regular rhythm and normal heart sounds  No murmur heard  Pulmonary/Chest: Effort normal and breath sounds normal  No respiratory distress  He has no wheezes  Abdominal: Soft  Normal appearance and bowel sounds are normal  He exhibits distension  There is no tenderness  Neurological: He is alert and oriented to person, place, and time  Skin: He is not diaphoretic  Psychiatric: He has a normal mood and affect  Vitals reviewed        Labs:  Office Visit on 03/12/2020   Component Date Value Ref Range Status    LEUKOCYTE ESTERASE,UA 03/12/2020 neg   Final    NITRITE,UA 03/12/2020 neg   Final    SL AMB POCT UROBILINOGEN 03/12/2020 0 2(3 5)   Final    POCT URINE PROTEIN 03/12/2020 100(1 0)++   Final     PH,UA 03/12/2020 6 0   Final    BLOOD,UA 03/12/2020 neg   Final    SPECIFIC GRAVITY,UA 03/12/2020 1 010   Final    KETONES,UA 03/12/2020 neg   Final    BILIRUBIN,UA 03/12/2020 neg   Final    GLUCOSE, UA 03/12/2020 neg   Final     COLOR,UA 03/12/2020 tressa   Final    CLARITY,UA 03/12/2020 clear   Final       Damien Winter MD    Note: Portions of the record have been created with voice recognition software  Occasional wrong word or "sound a like" substitutions may have occurred due to the inherent limitations of voice recognition software  Read the chart carefully and recognize, using context, where substitutions have occurred

## 2020-03-24 ENCOUNTER — TELEPHONE (OUTPATIENT)
Dept: FAMILY MEDICINE CLINIC | Facility: CLINIC | Age: 60
End: 2020-03-24

## 2020-03-24 NOTE — TELEPHONE ENCOUNTER
No direct studies have been done with the combination, but fish oil can be a little bit of a blood thinner, so I would avoid using it since he is already on Xarelto

## 2020-03-24 NOTE — TELEPHONE ENCOUNTER
Patient called stating he is on Xarelto and would like to know if its ok to take Fish oil with it  Please advise

## 2020-04-04 DIAGNOSIS — K22.2 SCHATZKI'S RING OF DISTAL ESOPHAGUS: ICD-10-CM

## 2020-04-06 RX ORDER — OMEPRAZOLE 20 MG/1
CAPSULE, DELAYED RELEASE ORAL
Qty: 30 CAPSULE | Refills: 0 | Status: SHIPPED | OUTPATIENT
Start: 2020-04-06 | End: 2020-09-28 | Stop reason: ALTCHOICE

## 2020-06-24 DIAGNOSIS — I10 ESSENTIAL (PRIMARY) HYPERTENSION: ICD-10-CM

## 2020-06-24 RX ORDER — METOPROLOL SUCCINATE 50 MG/1
TABLET, EXTENDED RELEASE ORAL
Qty: 180 TABLET | Refills: 1 | Status: SHIPPED | OUTPATIENT
Start: 2020-06-24 | End: 2021-04-06 | Stop reason: SDUPTHER

## 2020-06-24 RX ORDER — METOPROLOL SUCCINATE 50 MG/1
TABLET, EXTENDED RELEASE ORAL
Qty: 180 TABLET | Refills: 1 | Status: SHIPPED | OUTPATIENT
Start: 2020-06-24 | End: 2020-06-24

## 2020-07-09 DIAGNOSIS — I10 ESSENTIAL (PRIMARY) HYPERTENSION: ICD-10-CM

## 2020-07-09 DIAGNOSIS — E79.0 HYPERURICEMIA: ICD-10-CM

## 2020-07-09 RX ORDER — LISINOPRIL 20 MG/1
TABLET ORAL
Qty: 90 TABLET | Refills: 1 | Status: SHIPPED | OUTPATIENT
Start: 2020-07-09 | End: 2020-12-21 | Stop reason: SDUPTHER

## 2020-07-09 RX ORDER — ALLOPURINOL 300 MG/1
TABLET ORAL
Qty: 90 TABLET | Refills: 1 | Status: SHIPPED | OUTPATIENT
Start: 2020-07-09 | End: 2020-12-21 | Stop reason: SDUPTHER

## 2020-07-17 DIAGNOSIS — D68.51 FACTOR V LEIDEN MUTATION (HCC): ICD-10-CM

## 2020-07-17 RX ORDER — RIVAROXABAN 20 MG/1
TABLET, FILM COATED ORAL
Qty: 90 TABLET | Refills: 1 | Status: SHIPPED | OUTPATIENT
Start: 2020-07-17 | End: 2021-04-06 | Stop reason: SDUPTHER

## 2020-09-28 ENCOUNTER — OFFICE VISIT (OUTPATIENT)
Dept: FAMILY MEDICINE CLINIC | Facility: CLINIC | Age: 60
End: 2020-09-28
Payer: COMMERCIAL

## 2020-09-28 VITALS
DIASTOLIC BLOOD PRESSURE: 84 MMHG | SYSTOLIC BLOOD PRESSURE: 124 MMHG | WEIGHT: 199 LBS | HEART RATE: 84 BPM | TEMPERATURE: 98.2 F | RESPIRATION RATE: 18 BRPM | OXYGEN SATURATION: 96 % | HEIGHT: 69 IN | BODY MASS INDEX: 29.47 KG/M2

## 2020-09-28 DIAGNOSIS — R13.19 ESOPHAGEAL DYSPHAGIA: ICD-10-CM

## 2020-09-28 DIAGNOSIS — D58.2 ABNORMAL HEMOGLOBIN (HGB) (HCC): ICD-10-CM

## 2020-09-28 DIAGNOSIS — K76.0 HEPATIC STEATOSIS: Primary | ICD-10-CM

## 2020-09-28 DIAGNOSIS — Z12.11 COLON CANCER SCREENING: ICD-10-CM

## 2020-09-28 DIAGNOSIS — R31.29 OTHER MICROSCOPIC HEMATURIA: ICD-10-CM

## 2020-09-28 PROCEDURE — 99214 OFFICE O/P EST MOD 30 MIN: CPT | Performed by: FAMILY MEDICINE

## 2020-09-28 PROCEDURE — 1036F TOBACCO NON-USER: CPT | Performed by: FAMILY MEDICINE

## 2020-09-28 NOTE — ASSESSMENT & PLAN NOTE
Lab Results   Component Value Date    WBC 7 7 12/20/2019    HGB 18 0 (H) 12/20/2019    HCT 49 9 12/20/2019    MCV 96 1 12/20/2019     (L) 12/20/2019     Close to baseline

## 2020-09-28 NOTE — PROGRESS NOTES
FAMILY MEDICINE PROGRESS NOTE  Mini Bautista 64 y o  male   DATE: September 28, 2020     ASSESSMENT and PLAN:  Mini Bautista is a 64 y o  male with:     Problem List Items Addressed This Visit        Digestive    Hepatic steatosis - Primary     AST 54, ALT 62, slightly elevated than previous, does have known history of hepatic steatosis noted on CT scan in 2016  Reviewed importance of weight loss and diet, check ultrasound elastography to delineate level of steatosis  Patient will be re-establishing with GI for colonoscopy and EGD         Relevant Orders    Hepatic function panel    US elastography    Ambulatory referral to Gastroenterology    Esophageal dysphagia    Relevant Orders    Ambulatory referral to Gastroenterology       Genitourinary    Other microscopic hematuria     Mild hematuria noted on incidental urinalysis for work, asymptomatic, patient denies gross hematuria  Recheck urine test prior to additional workup given that he is on Xarelto         Relevant Orders    UA w Reflex to Microscopic w Reflex to Culture -Lab Collect       Other    Abnormal hemoglobin (Hgb) (HCC)     Lab Results   Component Value Date    WBC 7 7 12/20/2019    HGB 18 0 (H) 12/20/2019    HCT 49 9 12/20/2019    MCV 96 1 12/20/2019     (L) 12/20/2019     Close to baseline           Other Visit Diagnoses     Colon cancer screening        Relevant Orders    Ambulatory referral to Gastroenterology          SUBJECTIVE:  Mini Bautista is a 64 y o  male who presents today with a chief complaint of Follow-up (lab results)  Patient had labs done on July 31, 2020 at [facility] that he would like to review today  Patient had elevated hemoglobin and hematocrit which is similar to his baseline  Patient also has history of hepatic steatosis noted on CT scan in 2016, elevated AST and ALT, similarly elevated on that blood work  Mild hematuria, denies symptoms  FLP WNL  Review of Systems   Constitutional: Negative for fatigue  Gastrointestinal: Negative for abdominal pain  Hematological: Bruises/bleeds easily (on Xarelto, but doesnt bruise easily)  I have reviewed the patient's Past Medical History  Current Outpatient Medications:     allopurinol (ZYLOPRIM) 300 mg tablet, TAKE 1 TABLET BY MOUTH DAILY, Disp: 90 tablet, Rfl: 1    lisinopril (ZESTRIL) 20 mg tablet, TAKE 1 TABLET BY MOUTH DAILY, Disp: 90 tablet, Rfl: 1    metoprolol succinate (TOPROL-XL) 50 mg 24 hr tablet, TAKE 1 TABLET BY MOUTH TWICE DAILY, Disp: 180 tablet, Rfl: 1    Multiple Vitamins-Minerals (Centrum Silver 50+Men) TABS, Take by mouth, Disp: , Rfl:     NON FORMULARY, Take 1 tablet by mouth once a week, Disp: , Rfl:     XARELTO 20 MG tablet, TAKE 1 TABLET BY MOUTH DAILY WITH BREAKFAST (Patient taking differently: 20 mg every evening ), Disp: 90 tablet, Rfl: 1    OBJECTIVE:  /84 (BP Location: Left arm, Patient Position: Sitting, Cuff Size: Large)   Pulse 84   Temp 98 2 °F (36 8 °C)   Resp 18   Ht 5' 9" (1 753 m)   Wt 90 3 kg (199 lb)   SpO2 96%   BMI 29 39 kg/m²      I have spent 29 minutes with Patient today in which greater than 50% of this time was spent in counseling/coordination of care regarding Risks and benefits of tx options, Instructions for management, Patient and family education, Importance of treatment compliance and Impressions  BMI Counseling: Body mass index is 29 39 kg/m²  The BMI is above normal  Nutrition recommendations include encouraging healthy choices of fruits and vegetables  Adrian Leyva MD    Note: Portions of the record have been created with voice recognition software  Occasional wrong word or "sound a like" substitutions may have occurred due to the inherent limitations of voice recognition software  Read the chart carefully and recognize, using context, where substitutions have occurred

## 2020-09-28 NOTE — ASSESSMENT & PLAN NOTE
AST 54, ALT 62, slightly elevated than previous, does have known history of hepatic steatosis noted on CT scan in 2016  Reviewed importance of weight loss and diet, check ultrasound elastography to delineate level of steatosis    Patient will be re-establishing with GI for colonoscopy and EGD

## 2020-10-04 ENCOUNTER — APPOINTMENT (EMERGENCY)
Dept: RADIOLOGY | Facility: HOSPITAL | Age: 60
End: 2020-10-04
Payer: COMMERCIAL

## 2020-10-04 ENCOUNTER — HOSPITAL ENCOUNTER (EMERGENCY)
Facility: HOSPITAL | Age: 60
Discharge: HOME | End: 2020-10-05
Attending: EMERGENCY MEDICINE | Admitting: EMERGENCY MEDICINE
Payer: COMMERCIAL

## 2020-10-04 ENCOUNTER — APPOINTMENT (EMERGENCY)
Dept: CT IMAGING | Facility: HOSPITAL | Age: 60
End: 2020-10-04
Payer: COMMERCIAL

## 2020-10-04 VITALS
DIASTOLIC BLOOD PRESSURE: 85 MMHG | SYSTOLIC BLOOD PRESSURE: 132 MMHG | HEART RATE: 118 BPM | RESPIRATION RATE: 18 BRPM | OXYGEN SATURATION: 91 %

## 2020-10-04 DIAGNOSIS — S42.009A CLAVICLE FRACTURE: Primary | ICD-10-CM

## 2020-10-04 DIAGNOSIS — S20.212A RIB CONTUSION, LEFT, INITIAL ENCOUNTER: ICD-10-CM
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
## 2020-10-04 PROCEDURE — 99284 EMERGENCY DEPT VISIT MOD MDM: CPT

## 2020-10-04 PROCEDURE — 73564 X-RAY EXAM KNEE 4 OR MORE: CPT

## 2020-10-04 PROCEDURE — 99285 EMERGENCY DEPT VISIT HI MDM: CPT | Performed by: EMERGENCY MEDICINE

## 2020-10-04 PROCEDURE — 71101 X-RAY EXAM UNILAT RIBS/CHEST: CPT

## 2020-10-04 PROCEDURE — 70450 CT HEAD/BRAIN W/O DYE: CPT

## 2020-10-04 PROCEDURE — 73502 X-RAY EXAM HIP UNI 2-3 VIEWS: CPT

## 2020-10-04 PROCEDURE — 73000 X-RAY EXAM OF COLLAR BONE: CPT

## 2020-10-04 PROCEDURE — G1004 CDSM NDSC: HCPCS

## 2020-10-04 RX ORDER — OXYCODONE HYDROCHLORIDE AND ACETAMINOPHEN 5; 325 MG/1; MG/1
1 TABLET ORAL EVERY 4 HOURS PRN
Qty: 15 TABLET | Refills: 0 | Status: SHIPPED | OUTPATIENT
Start: 2020-10-04 | End: 2021-04-23 | Stop reason: ALTCHOICE

## 2020-10-04 RX ORDER — KETOROLAC TROMETHAMINE 30 MG/ML
15 INJECTION, SOLUTION INTRAMUSCULAR; INTRAVENOUS ONCE
Status: DISCONTINUED | OUTPATIENT
Start: 2020-10-05 | End: 2020-10-05 | Stop reason: HOSPADM

## 2020-10-07 ENCOUNTER — OFFICE VISIT (OUTPATIENT)
Dept: OBGYN CLINIC | Facility: HOSPITAL | Age: 60
End: 2020-10-07
Payer: COMMERCIAL

## 2020-10-07 VITALS
HEART RATE: 97 BPM | HEIGHT: 69 IN | BODY MASS INDEX: 29.21 KG/M2 | WEIGHT: 197.2 LBS | SYSTOLIC BLOOD PRESSURE: 136 MMHG | DIASTOLIC BLOOD PRESSURE: 93 MMHG

## 2020-10-07 DIAGNOSIS — S80.01XA CONTUSION OF RIGHT KNEE, INITIAL ENCOUNTER: ICD-10-CM

## 2020-10-07 DIAGNOSIS — S42.018A CLOSED NONDISPLACED FRACTURE OF STERNAL END OF LEFT CLAVICLE, INITIAL ENCOUNTER: Primary | ICD-10-CM

## 2020-10-07 PROCEDURE — 99204 OFFICE O/P NEW MOD 45 MIN: CPT | Performed by: ORTHOPAEDIC SURGERY

## 2020-10-09 ENCOUNTER — IMMUNIZATIONS (OUTPATIENT)
Dept: FAMILY MEDICINE CLINIC | Facility: CLINIC | Age: 60
End: 2020-10-09
Payer: COMMERCIAL

## 2020-10-09 ENCOUNTER — APPOINTMENT (EMERGENCY)
Dept: RADIOLOGY | Facility: HOSPITAL | Age: 60
End: 2020-10-09
Payer: COMMERCIAL

## 2020-10-09 ENCOUNTER — HOSPITAL ENCOUNTER (EMERGENCY)
Facility: HOSPITAL | Age: 60
Discharge: HOME | End: 2020-10-10
Attending: EMERGENCY MEDICINE | Admitting: EMERGENCY MEDICINE
Payer: COMMERCIAL

## 2020-10-09 ENCOUNTER — APPOINTMENT (EMERGENCY)
Dept: CT IMAGING | Facility: HOSPITAL | Age: 60
End: 2020-10-09
Payer: COMMERCIAL

## 2020-10-09 DIAGNOSIS — S42.009A CLAVICLE FRACTURE: ICD-10-CM

## 2020-10-09 DIAGNOSIS — Z23 ENCOUNTER FOR IMMUNIZATION: Primary | ICD-10-CM

## 2020-10-09 DIAGNOSIS — S22.49XA RIB FRACTURES: Primary | ICD-10-CM

## 2020-10-09 LAB
ALBUMIN SERPL BCP-MCNC: 3.7 G/DL (ref 3.5–5)
ALP SERPL-CCNC: 63 U/L (ref 46–116)
ALT SERPL W P-5'-P-CCNC: 54 U/L (ref 12–78)
ANION GAP SERPL CALCULATED.3IONS-SCNC: 11 MMOL/L (ref 4–13)
APTT PPP: 26 SECONDS (ref 23–37)
AST SERPL W P-5'-P-CCNC: 36 U/L (ref 5–45)
BASOPHILS # BLD AUTO: 0.09 THOUSANDS/ΜL (ref 0–0.1)
BASOPHILS NFR BLD AUTO: 1 % (ref 0–1)
BILIRUB SERPL-MCNC: 1.32 MG/DL (ref 0.2–1)
BUN SERPL-MCNC: 12 MG/DL (ref 5–25)
CALCIUM SERPL-MCNC: 9 MG/DL (ref 8.3–10.1)
CHLORIDE SERPL-SCNC: 102 MMOL/L (ref 100–108)
CO2 SERPL-SCNC: 25 MMOL/L (ref 21–32)
CREAT SERPL-MCNC: 0.98 MG/DL (ref 0.6–1.3)
EOSINOPHIL # BLD AUTO: 0.26 THOUSAND/ΜL (ref 0–0.61)
EOSINOPHIL NFR BLD AUTO: 2 % (ref 0–6)
ERYTHROCYTE [DISTWIDTH] IN BLOOD BY AUTOMATED COUNT: 12.6 % (ref 11.6–15.1)
GFR SERPL CREATININE-BSD FRML MDRD: 85 ML/MIN/1.73SQ M
GLUCOSE SERPL-MCNC: 88 MG/DL (ref 65–140)
HCT VFR BLD AUTO: 49.5 % (ref 36.5–49.3)
HGB BLD-MCNC: 17.2 G/DL (ref 12–17)
IMM GRANULOCYTES # BLD AUTO: 0.07 THOUSAND/UL (ref 0–0.2)
IMM GRANULOCYTES NFR BLD AUTO: 1 % (ref 0–2)
INR PPP: 1.11 (ref 0.84–1.19)
LYMPHOCYTES # BLD AUTO: 3.03 THOUSANDS/ΜL (ref 0.6–4.47)
LYMPHOCYTES NFR BLD AUTO: 22 % (ref 14–44)
MCH RBC QN AUTO: 34.7 PG (ref 26.8–34.3)
MCHC RBC AUTO-ENTMCNC: 34.7 G/DL (ref 31.4–37.4)
MCV RBC AUTO: 100 FL (ref 82–98)
MONOCYTES # BLD AUTO: 1 THOUSAND/ΜL (ref 0.17–1.22)
MONOCYTES NFR BLD AUTO: 7 % (ref 4–12)
NEUTROPHILS # BLD AUTO: 9.2 THOUSANDS/ΜL (ref 1.85–7.62)
NEUTS SEG NFR BLD AUTO: 67 % (ref 43–75)
NRBC BLD AUTO-RTO: 0 /100 WBCS
PLATELET # BLD AUTO: 186 THOUSANDS/UL (ref 149–390)
PMV BLD AUTO: 9.9 FL (ref 8.9–12.7)
POTASSIUM SERPL-SCNC: 3.9 MMOL/L (ref 3.5–5.3)
PROT SERPL-MCNC: 7.4 G/DL (ref 6.4–8.2)
PROTHROMBIN TIME: 14.4 SECONDS (ref 11.6–14.5)
RBC # BLD AUTO: 4.96 MILLION/UL (ref 3.88–5.62)
SODIUM SERPL-SCNC: 138 MMOL/L (ref 136–145)
TROPONIN I SERPL-MCNC: <0.02 NG/ML
WBC # BLD AUTO: 13.65 THOUSAND/UL (ref 4.31–10.16)

## 2020-10-09 PROCEDURE — G1004 CDSM NDSC: HCPCS

## 2020-10-09 PROCEDURE — 85730 THROMBOPLASTIN TIME PARTIAL: CPT | Performed by: EMERGENCY MEDICINE

## 2020-10-09 PROCEDURE — 85025 COMPLETE CBC W/AUTO DIFF WBC: CPT | Performed by: EMERGENCY MEDICINE

## 2020-10-09 PROCEDURE — 80053 COMPREHEN METABOLIC PANEL: CPT | Performed by: EMERGENCY MEDICINE

## 2020-10-09 PROCEDURE — 74177 CT ABD & PELVIS W/CONTRAST: CPT

## 2020-10-09 PROCEDURE — 99285 EMERGENCY DEPT VISIT HI MDM: CPT | Performed by: EMERGENCY MEDICINE

## 2020-10-09 PROCEDURE — 85610 PROTHROMBIN TIME: CPT | Performed by: EMERGENCY MEDICINE

## 2020-10-09 PROCEDURE — 90682 RIV4 VACC RECOMBINANT DNA IM: CPT

## 2020-10-09 PROCEDURE — 36415 COLL VENOUS BLD VENIPUNCTURE: CPT

## 2020-10-09 PROCEDURE — 90471 IMMUNIZATION ADMIN: CPT

## 2020-10-09 PROCEDURE — 99285 EMERGENCY DEPT VISIT HI MDM: CPT

## 2020-10-09 PROCEDURE — 84484 ASSAY OF TROPONIN QUANT: CPT | Performed by: EMERGENCY MEDICINE

## 2020-10-09 PROCEDURE — 93005 ELECTROCARDIOGRAM TRACING: CPT

## 2020-10-09 PROCEDURE — 71260 CT THORAX DX C+: CPT

## 2020-10-09 RX ORDER — ACETAMINOPHEN 325 MG/1
975 TABLET ORAL ONCE
Status: COMPLETED | OUTPATIENT
Start: 2020-10-09 | End: 2020-10-09

## 2020-10-09 RX ADMIN — ACETAMINOPHEN 975 MG: 325 TABLET, FILM COATED ORAL at 23:50

## 2020-10-10 VITALS
RESPIRATION RATE: 18 BRPM | OXYGEN SATURATION: 97 % | HEART RATE: 89 BPM | DIASTOLIC BLOOD PRESSURE: 69 MMHG | TEMPERATURE: 98.8 F | SYSTOLIC BLOOD PRESSURE: 143 MMHG

## 2020-10-10 LAB
ATRIAL RATE: 83 BPM
P AXIS: 44 DEGREES
PR INTERVAL: 150 MS
QRS AXIS: 56 DEGREES
QRSD INTERVAL: 80 MS
QT INTERVAL: 362 MS
QTC INTERVAL: 425 MS
T WAVE AXIS: 38 DEGREES
VENTRICULAR RATE: 83 BPM

## 2020-10-10 PROCEDURE — 93010 ELECTROCARDIOGRAM REPORT: CPT | Performed by: INTERNAL MEDICINE

## 2020-10-10 RX ADMIN — IOHEXOL 100 ML: 350 INJECTION, SOLUTION INTRAVENOUS at 00:02

## 2020-10-16 DIAGNOSIS — S42.018A CLOSED NONDISPLACED FRACTURE OF STERNAL END OF LEFT CLAVICLE, INITIAL ENCOUNTER: Primary | ICD-10-CM

## 2020-10-21 ENCOUNTER — HOSPITAL ENCOUNTER (OUTPATIENT)
Dept: RADIOLOGY | Facility: HOSPITAL | Age: 60
Discharge: HOME | End: 2020-10-21
Attending: ORTHOPAEDIC SURGERY
Payer: COMMERCIAL

## 2020-10-21 ENCOUNTER — OFFICE VISIT (OUTPATIENT)
Dept: OBGYN CLINIC | Facility: HOSPITAL | Age: 60
End: 2020-10-21
Payer: COMMERCIAL

## 2020-10-21 VITALS
WEIGHT: 192 LBS | DIASTOLIC BLOOD PRESSURE: 87 MMHG | HEART RATE: 67 BPM | SYSTOLIC BLOOD PRESSURE: 130 MMHG | HEIGHT: 69 IN | BODY MASS INDEX: 28.44 KG/M2

## 2020-10-21 DIAGNOSIS — S80.01XA CONTUSION OF RIGHT KNEE, INITIAL ENCOUNTER: Primary | ICD-10-CM

## 2020-10-21 DIAGNOSIS — S42.018A CLOSED NONDISPLACED FRACTURE OF STERNAL END OF LEFT CLAVICLE, INITIAL ENCOUNTER: ICD-10-CM

## 2020-10-21 PROCEDURE — 99213 OFFICE O/P EST LOW 20 MIN: CPT | Performed by: ORTHOPAEDIC SURGERY

## 2020-10-21 PROCEDURE — 20610 DRAIN/INJ JOINT/BURSA W/O US: CPT | Performed by: ORTHOPAEDIC SURGERY

## 2020-10-21 PROCEDURE — 73000 X-RAY EXAM OF COLLAR BONE: CPT

## 2020-10-21 RX ORDER — METHYLPREDNISOLONE ACETATE 40 MG/ML
2 INJECTION, SUSPENSION INTRA-ARTICULAR; INTRALESIONAL; INTRAMUSCULAR; SOFT TISSUE
Status: COMPLETED | OUTPATIENT
Start: 2020-10-21 | End: 2020-10-21

## 2020-10-21 RX ADMIN — METHYLPREDNISOLONE ACETATE 2 ML: 40 INJECTION, SUSPENSION INTRA-ARTICULAR; INTRALESIONAL; INTRAMUSCULAR; SOFT TISSUE at 09:28

## 2020-10-31 LAB
ALBUMIN SERPL-MCNC: 4.4 G/DL (ref 3.6–5.1)
ALBUMIN/GLOB SERPL: 2.2 (CALC) (ref 1–2.5)
ALP SERPL-CCNC: 118 U/L (ref 35–144)
ALT SERPL-CCNC: 44 U/L (ref 9–46)
APPEARANCE UR: CLEAR
AST SERPL-CCNC: 38 U/L (ref 10–35)
BACTERIA UR QL AUTO: NORMAL /HPF
BILIRUB DIRECT SERPL-MCNC: 0.3 MG/DL
BILIRUB INDIRECT SERPL-MCNC: 1.1 MG/DL (CALC) (ref 0.2–1.2)
BILIRUB SERPL-MCNC: 1.4 MG/DL (ref 0.2–1.2)
BILIRUB UR QL STRIP: NEGATIVE
COLOR UR: YELLOW
GLOBULIN SER CALC-MCNC: 2 G/DL (CALC) (ref 1.9–3.7)
GLUCOSE UR QL STRIP: NEGATIVE
HGB UR QL STRIP: NEGATIVE
HYALINE CASTS #/AREA URNS LPF: NORMAL /LPF
KETONES UR QL STRIP: NEGATIVE
LEUKOCYTE ESTERASE UR QL STRIP: NEGATIVE
NITRITE UR QL STRIP: NEGATIVE
PH UR STRIP: 6 [PH] (ref 5–8)
PROT SERPL-MCNC: 6.4 G/DL (ref 6.1–8.1)
PROT UR QL STRIP: NEGATIVE
RBC #/AREA URNS HPF: NORMAL /HPF
SP GR UR STRIP: 1 (ref 1–1.03)
SQUAMOUS #/AREA URNS HPF: NORMAL /HPF
WBC #/AREA URNS HPF: NORMAL /HPF

## 2020-11-11 ENCOUNTER — OFFICE VISIT (OUTPATIENT)
Dept: GASTROENTEROLOGY | Facility: AMBULARY SURGERY CENTER | Age: 60
End: 2020-11-11
Payer: COMMERCIAL

## 2020-11-11 VITALS — BODY MASS INDEX: 27.76 KG/M2 | TEMPERATURE: 97.2 F | HEIGHT: 69 IN | WEIGHT: 187.4 LBS

## 2020-11-11 DIAGNOSIS — R13.19 ESOPHAGEAL DYSPHAGIA: Primary | ICD-10-CM

## 2020-11-11 DIAGNOSIS — K76.0 HEPATIC STEATOSIS: ICD-10-CM

## 2020-11-11 DIAGNOSIS — Z79.01 ANTICOAGULANT LONG-TERM USE: ICD-10-CM

## 2020-11-11 DIAGNOSIS — Z86.010 HISTORY OF COLON POLYPS: ICD-10-CM

## 2020-11-11 PROBLEM — Z86.0100 HISTORY OF COLON POLYPS: Status: ACTIVE | Noted: 2020-11-11

## 2020-11-11 PROCEDURE — 99244 OFF/OP CNSLTJ NEW/EST MOD 40: CPT | Performed by: INTERNAL MEDICINE

## 2020-11-11 PROCEDURE — 1036F TOBACCO NON-USER: CPT | Performed by: INTERNAL MEDICINE

## 2020-11-11 RX ORDER — SODIUM, POTASSIUM,MAG SULFATES 17.5-3.13G
2 SOLUTION, RECONSTITUTED, ORAL ORAL SEE ADMIN INSTRUCTIONS
Qty: 2 BOTTLE | Refills: 0 | Status: SHIPPED | OUTPATIENT
Start: 2020-11-11 | End: 2021-04-23 | Stop reason: ALTCHOICE

## 2020-11-15 DIAGNOSIS — S80.01XA CONTUSION OF RIGHT KNEE, INITIAL ENCOUNTER: Primary | ICD-10-CM

## 2020-11-15 DIAGNOSIS — S42.018A CLOSED NONDISPLACED FRACTURE OF STERNAL END OF LEFT CLAVICLE, INITIAL ENCOUNTER: ICD-10-CM

## 2020-11-16 ENCOUNTER — TELEPHONE (OUTPATIENT)
Dept: GASTROENTEROLOGY | Facility: AMBULARY SURGERY CENTER | Age: 60
End: 2020-11-16

## 2020-11-17 DIAGNOSIS — Z11.59 SCREENING FOR VIRAL DISEASE: ICD-10-CM

## 2020-11-17 PROCEDURE — U0003 INFECTIOUS AGENT DETECTION BY NUCLEIC ACID (DNA OR RNA); SEVERE ACUTE RESPIRATORY SYNDROME CORONAVIRUS 2 (SARS-COV-2) (CORONAVIRUS DISEASE [COVID-19]), AMPLIFIED PROBE TECHNIQUE, MAKING USE OF HIGH THROUGHPUT TECHNOLOGIES AS DESCRIBED BY CMS-2020-01-R: HCPCS | Performed by: INTERNAL MEDICINE

## 2020-11-18 LAB — SARS-COV-2 RNA SPEC QL NAA+PROBE: NOT DETECTED

## 2020-11-22 ENCOUNTER — ANESTHESIA EVENT (OUTPATIENT)
Dept: GASTROENTEROLOGY | Facility: AMBULARY SURGERY CENTER | Age: 60
End: 2020-11-22

## 2020-11-22 DIAGNOSIS — S42.018A CLOSED NONDISPLACED FRACTURE OF STERNAL END OF LEFT CLAVICLE, INITIAL ENCOUNTER: Primary | ICD-10-CM

## 2020-11-23 ENCOUNTER — HOSPITAL ENCOUNTER (OUTPATIENT)
Dept: GASTROENTEROLOGY | Facility: AMBULARY SURGERY CENTER | Age: 60
Setting detail: OUTPATIENT SURGERY
Discharge: HOME | End: 2020-11-23
Attending: INTERNAL MEDICINE | Admitting: INTERNAL MEDICINE
Payer: COMMERCIAL

## 2020-11-23 ENCOUNTER — ANESTHESIA (OUTPATIENT)
Dept: GASTROENTEROLOGY | Facility: AMBULARY SURGERY CENTER | Age: 60
End: 2020-11-23

## 2020-11-23 VITALS
HEART RATE: 97 BPM | WEIGHT: 183 LBS | HEIGHT: 69 IN | TEMPERATURE: 97.8 F | BODY MASS INDEX: 27.11 KG/M2 | RESPIRATION RATE: 18 BRPM | SYSTOLIC BLOOD PRESSURE: 109 MMHG | OXYGEN SATURATION: 95 % | DIASTOLIC BLOOD PRESSURE: 81 MMHG

## 2020-11-23 VITALS — HEART RATE: 98 BPM

## 2020-11-23 DIAGNOSIS — K76.0 HEPATIC STEATOSIS: ICD-10-CM

## 2020-11-23 DIAGNOSIS — Z86.010 HISTORY OF COLON POLYPS: ICD-10-CM

## 2020-11-23 DIAGNOSIS — R13.19 ESOPHAGEAL DYSPHAGIA: ICD-10-CM

## 2020-11-23 DIAGNOSIS — Z79.01 ANTICOAGULANT LONG-TERM USE: ICD-10-CM

## 2020-11-23 PROBLEM — I82.409 DVT (DEEP VENOUS THROMBOSIS) (HCC): Status: ACTIVE | Noted: 2020-11-23

## 2020-11-23 PROBLEM — K21.9 GASTROESOPHAGEAL REFLUX DISEASE: Status: ACTIVE | Noted: 2020-11-23

## 2020-11-23 PROCEDURE — 43249 ESOPH EGD DILATION <30 MM: CPT | Performed by: INTERNAL MEDICINE

## 2020-11-23 PROCEDURE — 88305 TISSUE EXAM BY PATHOLOGIST: CPT | Performed by: PATHOLOGY

## 2020-11-23 PROCEDURE — 45385 COLONOSCOPY W/LESION REMOVAL: CPT | Performed by: INTERNAL MEDICINE

## 2020-11-23 PROCEDURE — C1726 CATH, BAL DIL, NON-VASCULAR: HCPCS

## 2020-11-23 RX ORDER — PROPOFOL 10 MG/ML
INJECTION, EMULSION INTRAVENOUS AS NEEDED
Status: DISCONTINUED | OUTPATIENT
Start: 2020-11-23 | End: 2020-11-23

## 2020-11-23 RX ORDER — GLYCOPYRROLATE 0.2 MG/ML
INJECTION INTRAMUSCULAR; INTRAVENOUS AS NEEDED
Status: DISCONTINUED | OUTPATIENT
Start: 2020-11-23 | End: 2020-11-23

## 2020-11-23 RX ORDER — SODIUM CHLORIDE, SODIUM LACTATE, POTASSIUM CHLORIDE, CALCIUM CHLORIDE 600; 310; 30; 20 MG/100ML; MG/100ML; MG/100ML; MG/100ML
75 INJECTION, SOLUTION INTRAVENOUS CONTINUOUS
Status: DISCONTINUED | OUTPATIENT
Start: 2020-11-23 | End: 2020-11-27 | Stop reason: HOSPADM

## 2020-11-23 RX ADMIN — PROPOFOL 20 MG: 10 INJECTION, EMULSION INTRAVENOUS at 11:46

## 2020-11-23 RX ADMIN — LIDOCAINE HYDROCHLORIDE 50 MG: 20 INJECTION, SOLUTION INTRAVENOUS at 11:29

## 2020-11-23 RX ADMIN — PROPOFOL 50 MG: 10 INJECTION, EMULSION INTRAVENOUS at 11:36

## 2020-11-23 RX ADMIN — SODIUM CHLORIDE, SODIUM LACTATE, POTASSIUM CHLORIDE, AND CALCIUM CHLORIDE 75 ML/HR: .6; .31; .03; .02 INJECTION, SOLUTION INTRAVENOUS at 11:23

## 2020-11-23 RX ADMIN — PROPOFOL 30 MG: 10 INJECTION, EMULSION INTRAVENOUS at 11:40

## 2020-11-23 RX ADMIN — PROPOFOL 50 MG: 10 INJECTION, EMULSION INTRAVENOUS at 11:32

## 2020-11-23 RX ADMIN — PROPOFOL 100 MG: 10 INJECTION, EMULSION INTRAVENOUS at 11:29

## 2020-11-23 RX ADMIN — PROPOFOL 20 MG: 10 INJECTION, EMULSION INTRAVENOUS at 11:43

## 2020-11-23 RX ADMIN — GLYCOPYRROLATE 0.2 MG: 0.2 INJECTION, SOLUTION INTRAMUSCULAR; INTRAVENOUS at 11:36

## 2020-11-23 RX ADMIN — PROPOFOL 50 MG: 10 INJECTION, EMULSION INTRAVENOUS at 11:35

## 2020-11-24 ENCOUNTER — HOSPITAL ENCOUNTER (OUTPATIENT)
Dept: ULTRASOUND IMAGING | Facility: HOSPITAL | Age: 60
Discharge: HOME | End: 2020-11-24
Payer: COMMERCIAL

## 2020-11-24 DIAGNOSIS — K76.0 HEPATIC STEATOSIS: ICD-10-CM

## 2020-11-24 PROCEDURE — 76981 USE PARENCHYMA: CPT

## 2020-11-25 ENCOUNTER — OFFICE VISIT (OUTPATIENT)
Dept: OBGYN CLINIC | Facility: HOSPITAL | Age: 60
End: 2020-11-25
Payer: COMMERCIAL

## 2020-11-25 ENCOUNTER — TELEPHONE (OUTPATIENT)
Dept: FAMILY MEDICINE CLINIC | Facility: CLINIC | Age: 60
End: 2020-11-25

## 2020-11-25 ENCOUNTER — HOSPITAL ENCOUNTER (OUTPATIENT)
Dept: RADIOLOGY | Facility: HOSPITAL | Age: 60
Discharge: HOME | End: 2020-11-25
Attending: ORTHOPAEDIC SURGERY
Payer: COMMERCIAL

## 2020-11-25 VITALS
HEIGHT: 69 IN | SYSTOLIC BLOOD PRESSURE: 132 MMHG | HEART RATE: 67 BPM | DIASTOLIC BLOOD PRESSURE: 90 MMHG | WEIGHT: 188 LBS | BODY MASS INDEX: 27.85 KG/M2

## 2020-11-25 DIAGNOSIS — M23.91 INTERNAL DERANGEMENT OF RIGHT KNEE: ICD-10-CM

## 2020-11-25 DIAGNOSIS — S42.018D CLOSED NONDISPLACED FRACTURE OF STERNAL END OF LEFT CLAVICLE WITH ROUTINE HEALING, SUBSEQUENT ENCOUNTER: Primary | ICD-10-CM

## 2020-11-25 DIAGNOSIS — K21.9 GASTROESOPHAGEAL REFLUX DISEASE WITHOUT ESOPHAGITIS: Primary | ICD-10-CM

## 2020-11-25 DIAGNOSIS — S42.018A CLOSED NONDISPLACED FRACTURE OF STERNAL END OF LEFT CLAVICLE, INITIAL ENCOUNTER: ICD-10-CM

## 2020-11-25 PROCEDURE — 73000 X-RAY EXAM OF COLLAR BONE: CPT

## 2020-11-25 PROCEDURE — 99213 OFFICE O/P EST LOW 20 MIN: CPT | Performed by: ORTHOPAEDIC SURGERY

## 2020-11-25 PROCEDURE — 3008F BODY MASS INDEX DOCD: CPT | Performed by: INTERNAL MEDICINE

## 2020-11-25 RX ORDER — FAMOTIDINE 20 MG/1
20 TABLET, FILM COATED ORAL 2 TIMES DAILY
Qty: 60 TABLET | Refills: 2 | Status: SHIPPED | OUTPATIENT
Start: 2020-11-25

## 2020-12-21 ENCOUNTER — TELEPHONE (OUTPATIENT)
Dept: FAMILY MEDICINE CLINIC | Facility: CLINIC | Age: 60
End: 2020-12-21

## 2020-12-21 DIAGNOSIS — E79.0 HYPERURICEMIA: ICD-10-CM

## 2020-12-21 DIAGNOSIS — I10 ESSENTIAL (PRIMARY) HYPERTENSION: ICD-10-CM

## 2020-12-21 NOTE — TELEPHONE ENCOUNTER
Patient states he received a text from [facility] that they received no response from our office re: refills for Allopurinol and Lisinopril

## 2020-12-22 RX ORDER — LISINOPRIL 20 MG/1
20 TABLET ORAL DAILY
Qty: 90 TABLET | Refills: 1 | Status: SHIPPED | OUTPATIENT
Start: 2020-12-22 | End: 2021-04-06 | Stop reason: SDUPTHER

## 2020-12-22 RX ORDER — ALLOPURINOL 300 MG/1
300 TABLET ORAL DAILY
Qty: 90 TABLET | Refills: 1 | Status: SHIPPED | OUTPATIENT
Start: 2020-12-22 | End: 2021-04-06 | Stop reason: SDUPTHER

## 2021-01-13 ENCOUNTER — TELEPHONE (OUTPATIENT)
Dept: OBGYN CLINIC | Facility: HOSPITAL | Age: 61
End: 2021-01-13

## 2021-01-13 DIAGNOSIS — S42.018D CLOSED NONDISPLACED FRACTURE OF STERNAL END OF LEFT CLAVICLE WITH ROUTINE HEALING, SUBSEQUENT ENCOUNTER: Primary | ICD-10-CM

## 2021-01-13 NOTE — TELEPHONE ENCOUNTER
Patient is calling wanting to get a new script for pt  He has not gotten the MRI done for his right knee he wanted to let you know

## 2021-01-13 NOTE — TELEPHONE ENCOUNTER
Patient notified   Patient provided with address and phone number for Rubin Pablo PT @ [employer]

## 2021-01-25 ENCOUNTER — EVALUATION (OUTPATIENT)
Dept: PHYSICAL THERAPY | Facility: MEDICAL CENTER | Age: 61
End: 2021-01-25
Payer: COMMERCIAL

## 2021-01-25 DIAGNOSIS — S42.018D CLOSED NONDISPLACED FRACTURE OF STERNAL END OF LEFT CLAVICLE WITH ROUTINE HEALING, SUBSEQUENT ENCOUNTER: Primary | ICD-10-CM

## 2021-01-25 PROCEDURE — 97140 MANUAL THERAPY 1/> REGIONS: CPT

## 2021-01-25 PROCEDURE — 97161 PT EVAL LOW COMPLEX 20 MIN: CPT
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
## 2021-01-27 ENCOUNTER — TELEPHONE (OUTPATIENT)
Dept: OBGYN CLINIC | Facility: HOSPITAL | Age: 61
End: 2021-01-27

## 2021-01-27 NOTE — TELEPHONE ENCOUNTER
Patient has not yet gotten the MRI ordered in November and would like an updated order placed  Patient was advised that the authorization is valid through 05-  Please advise

## 2021-01-27 NOTE — TELEPHONE ENCOUNTER
I spoke with the patient and let him know that he shouldn't need a updated MRI ordered  I told him if he has a hard time scheduling it then to give me a call back and we can put a new order in

## 2021-02-02 ENCOUNTER — APPOINTMENT (OUTPATIENT)
Dept: PHYSICAL THERAPY | Facility: MEDICAL CENTER | Age: 61
End: 2021-02-02
Payer: COMMERCIAL

## 2021-02-10 ENCOUNTER — OFFICE VISIT (OUTPATIENT)
Dept: PHYSICAL THERAPY | Facility: MEDICAL CENTER | Age: 61
End: 2021-02-10
Payer: COMMERCIAL

## 2021-02-10 DIAGNOSIS — S42.018D CLOSED NONDISPLACED FRACTURE OF STERNAL END OF LEFT CLAVICLE WITH ROUTINE HEALING, SUBSEQUENT ENCOUNTER: Primary | ICD-10-CM

## 2021-02-10 PROCEDURE — 97112 NEUROMUSCULAR REEDUCATION: CPT

## 2021-02-10 PROCEDURE — 97140 MANUAL THERAPY 1/> REGIONS: CPT

## 2021-02-10 PROCEDURE — 97110 THERAPEUTIC EXERCISES: CPT

## 2021-02-10 NOTE — PROGRESS NOTES
Daily Note     Today's date: 2/10/2021  Patient name: Anastacia Razo  : 1960  MRN: [id number]  Referring provider: Chu Pool MD  Dx:   Encounter Diagnosis     ICD-10-CM    1  Closed nondisplaced fracture of sternal end of left clavicle with routine healing, subsequent encounter  S42 018D        Start Time: 1500  Stop Time: 1545  Total time in clinic (min): 45 minutes    Subjective: Patient states no significnat change in function  He states he has been compliant with HEP to this date  Objective: See treatment diary below      Assessment: Tolerated treatment well  Patient with increased periscapular trigger points this session post use of pulley  With IASTM patient has symptom relief  Moderate restrictions during IASTM this session  Patient demonstrated fatigue post treatment and would benefit from continued PT      Plan: Continue per plan of care  Progress treatment as tolerated         Precautions: HTN, GERD      Manuals 1/25 2/10           IASTM pec minor, pec major, ant delt, sc, ac jt pec minor, pec major, ant delt, sc, ac jt, periscapular musculature           Cupping  RC musculature                                     Neuro Re-Ed             Row 3"x10 40# 2x10           PU Weight Shift 3"x5 ea            SL PNF D2             Rhythmic Stabilization  30"x3                                                  Ther Ex             Pec minor stretch 30"x3 3A0"x3           UBE  Pulley 5'           AAROM Flex  10"x10           AAROM Scap  10"x10           AAROm IR  10"x10           AAROM ER  10"x10           Wall Slide  10"x10                        Ther Activity                                       Gait Training                                       Modalities

## 2021-02-16 ENCOUNTER — OFFICE VISIT (OUTPATIENT)
Dept: PHYSICAL THERAPY | Facility: MEDICAL CENTER | Age: 61
End: 2021-02-16
Payer: COMMERCIAL

## 2021-02-16 DIAGNOSIS — S42.018D CLOSED NONDISPLACED FRACTURE OF STERNAL END OF LEFT CLAVICLE WITH ROUTINE HEALING, SUBSEQUENT ENCOUNTER: Primary | ICD-10-CM

## 2021-02-16 PROCEDURE — 97112 NEUROMUSCULAR REEDUCATION: CPT

## 2021-02-16 PROCEDURE — 97110 THERAPEUTIC EXERCISES: CPT

## 2021-02-16 PROCEDURE — 97140 MANUAL THERAPY 1/> REGIONS: CPT

## 2021-02-16 NOTE — PROGRESS NOTES
Daily Note     Today's date: 2021  Patient name: Jojo Kelly  : 1960  MRN: [id number]  Referring provider: Brigida Cade MD  Dx:   Encounter Diagnosis     ICD-10-CM    1  Closed nondisplaced fracture of sternal end of left clavicle with routine healing, subsequent encounter  S42 018D        Start Time: 9435  Stop Time: 1625  Total time in clinic (min): 42 minutes    Subjective: Pt states feeling improved overall in function  He states he has noticed improvement with AAROM exercises  Objective: See treatment diary below      Assessment: Tolerated treatment well  Patient educated in self STM to perform at home, patient performs exercise well  Patient consents to IASTM  Moderate restrictions noted with IASTM this session  Patient with increased redness post IASTM, with some petechiae  Patient demonstrated fatigue post treatment and would benefit from continued PT      Plan: Continue per plan of care  Progress treatment as tolerated         Precautions: HTN, GERD      Manuals 1/25 2/10 2/16          IASTM pec minor, pec major, ant delt, sc, ac jt pec minor, pec major, ant delt, sc, ac jt, periscapular musculature pec minor, pec major, ant delt, sc, ac jt, periscapular musculature          Cupping  RC musculature           PROM   With STM                       Neuro Re-Ed             Row 3"x10 40# 2x10           PU Weight Shift 3"x5 ea            SL PNF D2   3" 2x10          Rhythmic Stabilization  30"x3 30"x3          Standing GH ABC   1x                                    Ther Ex             Pec minor stretch 30"x3 3A0"x3           UBE  Pulley 5'           AAROM Flex  10"x10           AAROM Scap  10"x10           AAROm IR  10"x10           AAROM ER  10"x10           Lacrosse Ball STM   Pec minor/major          Wall Slide  10"x10 10"x10 Abduction          Pullies    5'          ER Stretch   30"x3          Ther Activity                                       Gait Training Modalities

## 2021-02-23 ENCOUNTER — EVALUATION (OUTPATIENT)
Dept: PHYSICAL THERAPY | Facility: MEDICAL CENTER | Age: 61
End: 2021-02-23
Payer: COMMERCIAL

## 2021-02-23 DIAGNOSIS — S42.018D CLOSED NONDISPLACED FRACTURE OF STERNAL END OF LEFT CLAVICLE WITH ROUTINE HEALING, SUBSEQUENT ENCOUNTER: Primary | ICD-10-CM

## 2021-02-23 PROCEDURE — 97110 THERAPEUTIC EXERCISES: CPT

## 2021-02-23 PROCEDURE — 97140 MANUAL THERAPY 1/> REGIONS: CPT
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
## 2021-03-02 ENCOUNTER — OFFICE VISIT (OUTPATIENT)
Dept: PHYSICAL THERAPY | Facility: MEDICAL CENTER | Age: 61
End: 2021-03-02
Payer: COMMERCIAL

## 2021-03-02 DIAGNOSIS — S42.018D CLOSED NONDISPLACED FRACTURE OF STERNAL END OF LEFT CLAVICLE WITH ROUTINE HEALING, SUBSEQUENT ENCOUNTER: Primary | ICD-10-CM

## 2021-03-02 PROCEDURE — 97140 MANUAL THERAPY 1/> REGIONS: CPT

## 2021-03-02 PROCEDURE — 97112 NEUROMUSCULAR REEDUCATION: CPT

## 2021-03-02 PROCEDURE — 97110 THERAPEUTIC EXERCISES: CPT

## 2021-03-02 NOTE — PROGRESS NOTES
Daily Note     Today's date: 3/2/2021  Patient name: Livia Pallas  : 1960  MRN: [id number]  Referring provider: Domonique Young MD  Dx:   Encounter Diagnosis     ICD-10-CM    1  Closed nondisplaced fracture of sternal end of left clavicle with routine healing, subsequent encounter  S42 018D        Start Time: 8938  Stop Time: 1612  Total time in clinic (min): 42 minutes    Subjective: Pt states pain has been "cut in half, but still feels stiff "      Objective: See treatment diary below      Assessment: Tolerated treatment well  Patient with c/o posterior shoulder pain with prolonged flexion stretch  Posterior capsule stretch alleviates issue  Patient tolerates PNF patterns well  Patient demonstrated fatigue post treatment and would benefit from continued PT      Plan: Continue per plan of care  Progress treatment as tolerated         Precautions: HTN, GERD        Manuals 1/25 2/10 2/16 2/23 3/2        IASTM pec minor, pec major, ant delt, sc, ac jt pec minor, pec major, ant delt, sc, ac jt, periscapular musculature pec minor, pec major, ant delt, sc, ac jt, periscapular musculature pec minor, pec major, ant delt, sc, ac jt, periscapular musculature pec minor, pec major, ant delt, sc, ac jt, periscapular musculature        Cupping  RC musculature           PROM   With STM With STM                      Neuro Re-Ed             Row 3"x10 40# 2x10  NV         PU Weight Shift 3"x5 ea            SL PNF D2   3" 2x10 NV         Rhythmic Stabilization  30"x3 30"x3 NV         Standing GH ABC   1x NV 1x flexion 1x 90/90        PNF D1 and D2 flexion     YTB x20 ea                     Ther Ex             Pec minor stretch 30"x3 3A0"x3           Sleeper Stretch     30"x3        UBE  Pulley 5'           AAROM Flex  10"x10           AAROM Scap  10"x10           AAROm IR  10"x10           AAROM ER  10"x10           Pec Stretch     30"x3        Thoracic Ext             Lacrosse Ball STM   Pec minor/major Pec minor/major Reevaluation    10'         Wall Slide  10"x10 10"x10 Abduction 10"x10 Abduction 10"x10 Abduction        Flexion Stretch    10"x10 10"x10        Pullies    5' 5' 5'        ER Stretch   30"x3 30"x3 30"x3        Ther Activity                                       Gait Training                                       Modalities

## 2021-03-05 ENCOUNTER — HOSPITAL ENCOUNTER (OUTPATIENT)
Dept: RADIOLOGY | Facility: HOSPITAL | Age: 61
Discharge: HOME | End: 2021-03-05
Attending: ORTHOPAEDIC SURGERY
Payer: COMMERCIAL

## 2021-03-05 DIAGNOSIS — M23.91 INTERNAL DERANGEMENT OF RIGHT KNEE: ICD-10-CM

## 2021-03-05 PROCEDURE — 73721 MRI JNT OF LWR EXTRE W/O DYE: CPT

## 2021-03-05 PROCEDURE — G1004 CDSM NDSC: HCPCS

## 2021-03-09 ENCOUNTER — APPOINTMENT (OUTPATIENT)
Dept: PHYSICAL THERAPY | Facility: MEDICAL CENTER | Age: 61
End: 2021-03-09
Payer: COMMERCIAL

## 2021-03-16 ENCOUNTER — OFFICE VISIT (OUTPATIENT)
Dept: PHYSICAL THERAPY | Facility: MEDICAL CENTER | Age: 61
End: 2021-03-16
Payer: COMMERCIAL

## 2021-03-16 DIAGNOSIS — S42.018D CLOSED NONDISPLACED FRACTURE OF STERNAL END OF LEFT CLAVICLE WITH ROUTINE HEALING, SUBSEQUENT ENCOUNTER: Primary | ICD-10-CM

## 2021-03-16 PROCEDURE — 97110 THERAPEUTIC EXERCISES: CPT

## 2021-03-16 PROCEDURE — 97140 MANUAL THERAPY 1/> REGIONS: CPT

## 2021-03-16 NOTE — PROGRESS NOTES
Daily Note     Today's date: 3/16/2021  Patient name: Justin Avendano  : 1960  MRN: [id number]  Referring provider: Tamir Slaughter MD  Dx: No diagnosis found  Start Time:   Stop Time:   Total time in clinic (min): 27 minutes    Subjective: Pt states he continues to notice improvements in ROM and pain  He states he lightly swung a golf club this weekend, however he states he experienced some tightness in his L shoulder  Objective: See treatment diary below      Assessment: Tolerated treatment well  Patient with min to mod restrictions during IASTM this session  Petechiae present post administration  With trial golf swing post IASTM, patient denies issues with "tightness" or pain  Patient demonstrated fatigue post treatment and would benefit from continued PT      Plan: Continue per plan of care  Progress treatment as tolerated         Precautions: HTN, GERD        Manuals 1/25 2/10 2/16 2/23 3/2 3/16       IASTM pec minor, pec major, ant delt, sc, ac jt pec minor, pec major, ant delt, sc, ac jt, periscapular musculature pec minor, pec major, ant delt, sc, ac jt, periscapular musculature pec minor, pec major, ant delt, sc, ac jt, periscapular musculature pec minor, pec major, ant delt, sc, ac jt, periscapular musculature pec minor, pec major, ant delt, sc, ac jt, periscapular musculature       Cupping  RC musculature           PROM   With STM With STM                      Neuro Re-Ed             Row 3"x10 40# 2x10  NV         PU Weight Shift 3"x5 ea            SL PNF D2   3" 2x10 NV         Rhythmic Stabilization  30"x3 30"x3 NV         Standing GH ABC   1x NV 1x flexion 1x 90/90 HEP       PNF D1 and D2 flexion     YTB x20 ea HEP       SL Abduction      3#x10       AROM Flex      3# x10       Ther Ex             Pec minor stretch 30"x3 3A0"x3           Sleeper Stretch     30"x3 HEP       UBE  Pulley 5'           AAROM Flex  10"x10           AAROM Scap  10"x10           AAROm IR  10"x10 10"x10 strap       AAROM ER  10"x10           Pec Stretch     30"x3 HEP       Thoracic Ext             Lacrosse Ball STM   Pec minor/major Pec minor/major         Reevaluation    10'         Wall Slide  10"x10 10"x10 Abduction 10"x10 Abduction 10"x10 Abduction 10"x10 Abduction       Flexion Stretch    10"x10 10"x10        Pullies    5' 5' 5' UBE NV       ER Stretch   30"x3 30"x3 30"x3 30"x3       Ther Activity                                       Gait Training                                       Modalities

## 2021-03-22 ENCOUNTER — OFFICE VISIT (OUTPATIENT)
Dept: URGENT CARE | Facility: MEDICAL CENTER | Age: 61
End: 2021-03-22
Payer: COMMERCIAL

## 2021-03-22 VITALS
DIASTOLIC BLOOD PRESSURE: 90 MMHG | HEART RATE: 68 BPM | TEMPERATURE: 97.5 F | SYSTOLIC BLOOD PRESSURE: 150 MMHG | WEIGHT: 197 LBS | HEIGHT: 69 IN | OXYGEN SATURATION: 96 % | RESPIRATION RATE: 18 BRPM | BODY MASS INDEX: 29.18 KG/M2

## 2021-03-22 DIAGNOSIS — M54.50 ACUTE RIGHT-SIDED LOW BACK PAIN WITHOUT SCIATICA: Primary | ICD-10-CM

## 2021-03-22 PROCEDURE — S9083 URGENT CARE CENTER GLOBAL: HCPCS | Performed by: PHYSICIAN ASSISTANT

## 2021-03-22 PROCEDURE — G0382 LEV 3 HOSP TYPE B ED VISIT: HCPCS | Performed by: PHYSICIAN ASSISTANT

## 2021-03-22 NOTE — PROGRESS NOTES
[address]        NAME: Sola Adams is a 64 y o  male  : 1960    MRN: [id number]  DATE: 2021  TIME: 7:29 PM    Assessment and Plan   Acute right-sided low back pain without sciatica [M54 5]  1  Acute right-sided low back pain without sciatica       Explained the patient this is muscular as it is reproduced with most motions of the lumbar back  I advised this is not diverticulitis as he does not even have abdominal pain or any abdominal symptoms  Recommended Tylenol for pain as he cannot take NSAIDs  Offered muscle relaxers but he declined  Recommended heat and OTC topical medications for back pain  Patient Instructions     Tylenol for pain   heat to back   may use OTC topical creams  Follow up with PCP in 3-5 days  Proceed to  ER if symptoms worsen  Chief Complaint     Chief Complaint   Patient presents with    Back Pain     lower back pain x 2 days          History of Present Illness        Patient is a 68-year-old male who presents today with complaints of low back pain for the past 2-3 days  Denies any injury  Denies any radiation of the pain with no numbness and tingling of the legs  Denies any history of back issues including degenerative disc disease or surgeries  Denies any bowel or bladder issues, denies urinary symptoms  No fever, chills, nausea, vomiting, diarrhea  Denies any abdominal pain  Patient feels this is consistent with diverticulitis even though he does not have abdominal pain since he has had this in the past   Denies any history of kidney stones or hematuria  Has not yet tried anything for the pain  Pain is reproducible with movement  Review of Systems   Review of Systems   Constitutional: Negative for chills and fever  Respiratory: Negative for shortness of breath  Cardiovascular: Negative for chest pain  Gastrointestinal: Negative for abdominal pain, diarrhea, nausea and vomiting     Genitourinary: Negative for difficulty urinating, flank pain and hematuria  Musculoskeletal: Positive for back pain  Skin: Negative for color change and rash  Neurological: Negative for numbness           Current Medications       Current Outpatient Medications:     allopurinol (ZYLOPRIM) 300 mg tablet, Take 1 tablet (300 mg total) by mouth daily, Disp: 90 tablet, Rfl: 1    famotidine (PEPCID) 20 mg tablet, Take 1 tablet (20 mg total) by mouth 2 (two) times a day, Disp: 60 tablet, Rfl: 2    lisinopril (ZESTRIL) 20 mg tablet, Take 1 tablet (20 mg total) by mouth daily, Disp: 90 tablet, Rfl: 1    metoprolol succinate (TOPROL-XL) 50 mg 24 hr tablet, TAKE 1 TABLET BY MOUTH TWICE DAILY, Disp: 180 tablet, Rfl: 1    Multiple Vitamins-Minerals (Centrum Silver 50+Men) TABS, Take by mouth, Disp: , Rfl:     XARELTO 20 MG tablet, TAKE 1 TABLET BY MOUTH DAILY WITH BREAKFAST (Patient taking differently: 20 mg every evening Last dose 11/20/20), Disp: 90 tablet, Rfl: 1    Na Sulfate-K Sulfate-Mg Sulf (Suprep Bowel Prep Kit) 17 5-3 13-1 6 GM/177ML SOLN, Take 2 Bottles by mouth see administration instructions Please follow the instructions from the office (Patient not taking: Reported on 3/22/2021), Disp: 2 Bottle, Rfl: 0    NON FORMULARY, Take 1 tablet by mouth once a week Vit b complex 2 x week, Disp: , Rfl:     oxyCODONE-acetaminophen (PERCOCET) 5-325 mg per tablet, Take 1 tablet by mouth every 4 (four) hours as needed for moderate pain for up to 15 dosesMax Daily Amount: 6 tablets (Patient not taking: Reported on 3/22/2021), Disp: 15 tablet, Rfl: 0    Current Allergies     Allergies as of 03/22/2021    (No Known Allergies)            The following portions of the patient's history were reviewed and updated as appropriate: allergies, current medications, past family history, past medical history, past social history, past surgical history and problem list      Past Medical History:   Diagnosis Date    Abnormal liver function test     DVT (deep venous thrombosis) ([facility] [address] )     left leg  2012 also has Factor V    Gouty arthritis     Polyp of sigmoid colon        Past Surgical History:   Procedure Laterality Date    COLONOSCOPY      UMBILICAL HERNIA REPAIR      UPPER GASTROINTESTINAL ENDOSCOPY      WISDOM TOOTH EXTRACTION         Family History   Problem Relation Age of Onset    Breast cancer Mother     Hypertension Mother     Lung cancer Mother     Coronary artery disease Father     Hypertension Father     Hypertension Brother     Leukemia Brother     Leukemia Sister          Medications have been verified  Objective   /90   Pulse 68   Temp 97 5 °F (36 4 °C)   Resp 18   Ht 5' 9" (1 753 m)   Wt 89 4 kg (197 lb)   SpO2 96%   BMI 29 09 kg/m²        Physical Exam     Physical Exam  Constitutional:       General: He is not in acute distress  Appearance: Normal appearance  He is obese  He is not ill-appearing  HENT:      Mouth/Throat:      Mouth: Mucous membranes are moist       Pharynx: Oropharynx is clear  Cardiovascular:      Rate and Rhythm: Normal rate and regular rhythm  Pulses: Normal pulses  Pulmonary:      Effort: Pulmonary effort is normal       Breath sounds: Normal breath sounds  Abdominal:      General: Abdomen is flat  Bowel sounds are normal  There is no distension  Palpations: Abdomen is soft  There is no mass  Tenderness: There is no abdominal tenderness  There is no right CVA tenderness, left CVA tenderness, guarding or rebound  Hernia: No hernia is present  Musculoskeletal: Normal range of motion  General: No swelling, deformity or signs of injury  Lumbar back: He exhibits tenderness, pain and spasm  He exhibits normal range of motion, no bony tenderness, no swelling, no edema, no deformity, no laceration and normal pulse  Back:    Skin:     General: Skin is warm and dry  Neurological:      Mental Status: He is alert

## 2021-03-23 ENCOUNTER — EVALUATION (OUTPATIENT)
Dept: PHYSICAL THERAPY | Facility: MEDICAL CENTER | Age: 61
End: 2021-03-23
Payer: COMMERCIAL

## 2021-03-23 DIAGNOSIS — S42.018D CLOSED NONDISPLACED FRACTURE OF STERNAL END OF LEFT CLAVICLE WITH ROUTINE HEALING, SUBSEQUENT ENCOUNTER: Primary | ICD-10-CM

## 2021-03-23 PROCEDURE — 97140 MANUAL THERAPY 1/> REGIONS: CPT

## 2021-03-23 PROCEDURE — 97112 NEUROMUSCULAR REEDUCATION: CPT

## 2021-03-23 PROCEDURE — 97110 THERAPEUTIC EXERCISES: CPT

## 2021-03-23 NOTE — PROGRESS NOTES
Daily Note and Discharge    Today's date: 3/23/2021  Patient name: Rebeca Junior  : 1960  MRN: [id number]  Referring provider: Perico Alexander MD  Dx:   Encounter Diagnosis     ICD-10-CM    1  Closed nondisplaced fracture of sternal end of left clavicle with routine healing, subsequent encounter  [id number]        Start Time:   Stop Time:   Total time in clinic (min): 38 minutes    Subjective: Pt states feeling improved overall with his L shoulder and clavicle  He states he notices occasional tightness however, overall notices an improvement  Objective: See treatment diary below      Assessment: Tolerated treatment well  Patient demonstrated fatigue post treatment Minimal restrictions during IASTM this session, moderate redness and petechiae present post administration  At this time patient symptoms which he originally sought skilled PT services have improved  Patient is independent with HEP  Pt is appropriate and agrees to skilled PT discharge       Plan: D/C at this time     Precautions: HTN, GERD        Manuals 1/25 2/10 2/16 2/23 3/2 3/16 3/23      IASTM pec minor, pec major, ant delt, sc, ac jt pec minor, pec major, ant delt, sc, ac jt, periscapular musculature pec minor, pec major, ant delt, sc, ac jt, periscapular musculature pec minor, pec major, ant delt, sc, ac jt, periscapular musculature pec minor, pec major, ant delt, sc, ac jt, periscapular musculature pec minor, pec major, ant delt, sc, ac jt, periscapular musculature pec minor, pec major, ant delt, sc, ac jt, periscapular musculature      Cupping  RC musculature           PROM   With STM With STM                      Neuro Re-Ed             Row 3"x10 40# 2x10  NV         PU Weight Shift 3"x5 ea            SL PNF D2   3" 2x10 NV         Rhythmic Stabilization  30"x3 30"x3 NV         Standing GH ABC   1x NV 1x flexion 1x 90/90 HEP 3 position for HEP 1x      PNF D1 and D2 flexion     YTB x20 ea HEP       SL Abduction      3#x10 3# x10                   AROM Flex      3# x10 3# x10      Ther Ex             Pec minor stretch 30"x3 3A0"x3           Sleeper Stretch     30"x3 HEP       UBE  Pulley 5'           AAROM Flex  10"x10           AAROM Scap  10"x10           AAROm IR  10"x10    10"x10 strap 30"x3      AAROM ER  10"x10           Pec Stretch     30"x3 HEP Reveiw      Thoracic Ext             Lacrosse Ball STM   Pec minor/major Pec minor/major   In door way, pec with flex/abduction      Reevaluation    10'         Wall Slide  10"x10 10"x10 Abduction 10"x10 Abduction 10"x10 Abduction 10"x10 Abduction 10"x10 Abduction      Flexion Stretch    10"x10 10"x10  10"x10      Pullies    5' 5' 5' UBE NV       ER Stretch   30"x3 30"x3 30"x3 30"x3 30"x3      Ther Activity              Carry       5# 20'x5                   Gait Training                                       Modalities

## 2021-03-30 ENCOUNTER — APPOINTMENT (OUTPATIENT)
Dept: PHYSICAL THERAPY | Facility: MEDICAL CENTER | Age: 61
End: 2021-03-30
Payer: COMMERCIAL

## 2021-04-06 DIAGNOSIS — E79.0 HYPERURICEMIA: ICD-10-CM

## 2021-04-06 DIAGNOSIS — D68.51 FACTOR V LEIDEN MUTATION (HCC): ICD-10-CM

## 2021-04-06 DIAGNOSIS — I10 ESSENTIAL (PRIMARY) HYPERTENSION: ICD-10-CM

## 2021-04-06 RX ORDER — METOPROLOL SUCCINATE 50 MG/1
50 TABLET, EXTENDED RELEASE ORAL 2 TIMES DAILY
Qty: 180 TABLET | Refills: 0 | Status: SHIPPED | OUTPATIENT
Start: 2021-04-06 | End: 2021-04-23 | Stop reason: SDUPTHER

## 2021-04-06 RX ORDER — ALLOPURINOL 300 MG/1
300 TABLET ORAL DAILY
Qty: 90 TABLET | Refills: 0 | Status: SHIPPED | OUTPATIENT
Start: 2021-04-06 | End: 2021-04-23 | Stop reason: SDUPTHER

## 2021-04-06 RX ORDER — LISINOPRIL 20 MG/1
20 TABLET ORAL DAILY
Qty: 90 TABLET | Refills: 0 | Status: SHIPPED | OUTPATIENT
Start: 2021-04-06 | End: 2021-04-23 | Stop reason: SDUPTHER

## 2021-04-06 NOTE — TELEPHONE ENCOUNTER
Patient due for 6 months follow-up  Will send no Rx no refills, further refills to be given at follow-up appointment  Please have patient schedule follow-up      Thank you

## 2021-04-19 ENCOUNTER — RA CDI HCC (OUTPATIENT)
Dept: OTHER | Facility: HOSPITAL | Age: 61
End: 2021-04-19

## 2021-04-19 NOTE — PROGRESS NOTES
[employer]  coding opportunities             Chart reviewed, (number of) suggestions sent to provider: 4     Problem listed updated  Provider Accepted, (number of) suggestions accepted: 0        Patients insurance company: [employer] (Medicare Advantage and Commercial)     Visit status: Patient arrived for their scheduled appointment     Provider never responded to [employer]  coding request  Provider used D68 51 , D69 9     [employer]  coding opportunities             Chart reviewed, (number of) suggestions sent to provider: 4           Patients insurance company: [employer] ([employer])           Found on active problem list_ please assess current status and document for 2021 billing     1) I82 409 DVT (deep venous thrombosis) ([facility])  2) D68 51 Factor V Leiden mutation ([facility])  3) D69 6 Thrombocytopenia ([facility])  4) D58 2 Abnormal hemoglobin (Hgb) ([facility])

## 2021-04-22 NOTE — ASSESSMENT & PLAN NOTE
Lab Results   Component Value Date    ALT 44 10/30/2020    AST 38 (H) 10/30/2020    GGT 88 06/17/2016    ALKPHOS 118 10/30/2020    BILITOT 0 9 12/01/2017     Overall stable  Elastography overall normal

## 2021-04-22 NOTE — PROGRESS NOTES
ANNUAL PHYSICAL    Date of Service: 21  Primary Care Provider:   Marly Carlson MD       Name: Ritchie Rome       : 1960       Age:60 y o  Sex: male      MRN: [id number]      Chief Complaint:Follow-up (6 months) and Medication Refill       Assessment and Plan:  64 y o  male exam      1  Health Maintenance  - Colon Cancer Screening: last colonoscopy in 2020, repeat in 5 years  - Prostate Cancer Screen: Reviewed risks/benefits of screening and patient declines at this time  - Labs: has lab work done via employer physical   - Immunizations: Reviewed  Recommend yearly flu vaccine      2  Other diagnoses addressed today:   Problem List Items Addressed This Visit        Digestive    Hepatic steatosis     Lab Results   Component Value Date    ALT 44 10/30/2020    AST 38 (H) 10/30/2020    GGT 88 2016    ALKPHOS 118 10/30/2020    BILITOT 0 9 2017     Overall stable  Elastography overall normal         Gastroesophageal reflux disease     Uses Pepcid as needed            Cardiovascular and Mediastinum    Essential hypertension     BP Readings from Last 3 Encounters:   21 106/80   21 150/90   20 132/90     Controlled, continue lisinopril 20 mg and metoprolol succinate 50 mg twice daily         Relevant Medications    lisinopril (ZESTRIL) 20 mg tablet    metoprolol succinate (TOPROL-XL) 50 mg 24 hr tablet       Hematopoietic and Hemostatic    Factor V Leiden mutation ([address] )     History of Factory V Leiden and multiple DVTs years ago after prolonged drive in 8149/7274  Had seen hematologist in the past (Dr Kely Rivas)  On anticoagulation with Xarelto 20mg daily         Relevant Medications    rivaroxaban (Xarelto) 20 mg tablet       Genitourinary    Other microscopic hematuria     Noted on previous UA, most recent normal             Other    Anticoagulant long-term use    Thrombocytopenia (HCC)     Stable  Has seen Heme/Onc in the past         Glucose intolerance     Lab Results   Component Value Date    HGBA1C 5 1 12/20/2019    HGBA1C 4 9 12/01/2017    HGBA1C 5 6 10/02/2014     Lab Results   Component Value Date    LDLCALC 101 (H) 12/20/2019    CREATININE 0 98 10/09/2020     Counseled on healthy eating, regular activity           Other Visit Diagnoses     Annual physical exam    -  Primary    Hyperuricemia        Relevant Medications    allopurinol (ZYLOPRIM) 300 mg tablet    Essential (primary) hypertension        Relevant Medications    lisinopril (ZESTRIL) 20 mg tablet    metoprolol succinate (TOPROL-XL) 50 mg 24 hr tablet    BMI 28 0-28 9,adult               Immunizations and preventive care screenings were discussed with patient today  Appropriate education was printed on patient's after visit summary  Counseling:  · Alcohol/drug use: discussed moderation in alcohol intake, the recommendations for healthy alcohol use, and avoidance of illicit drug use  · Dental Health: discussed importance of regular tooth brushing, flossing, and dental visits  · Injury prevention: discussed safety/seat belts, safety helmets, smoke detectors, carbon dioxide detectors  · Exercise: the importance of regular exercise/physical activity was discussed  Recommend exercise 3-5 times per week for at least 30 minutes  BMI Counseling: Body mass index is 28 06 kg/m²  The BMI is above normal  Nutrition recommendations include 3-5 servings of fruits/vegetables daily and reducing intake of saturated fat and trans fat  Exercise recommendations include moderate aerobic physical activity for 150 minutes/week  RTC 1 year for annual  visit or sooner PRN    Subjective:    Jamie Espinoza is a 64 y o  male and is here for his comprehensive physical exam      He works as an   Acute complaints: none  He follows with cardiology for history of hypertension  He is on lisinopril 20 mg and metoprolol succinate 50 mg twice daily  He had normal stress ECHO   He denies symptoms of hyper or hypotension  Diet and Physical Activity  · Diet/Nutrition: does not follow a well balanced diet  · Exercise: no formal exercise  General Health  · Vision: no vision problems and goes for regular eye exams  · Dental: regular dental visits          Histories Updated and Reviewed 4/23/2021:  Patient's Medications   New Prescriptions    No medications on file   Previous Medications    FAMOTIDINE (PEPCID) 20 MG TABLET    Take 1 tablet (20 mg total) by mouth 2 (two) times a day    FLUOROURACIL (EFUDEX) 5 % CREAM    APPLY TO FOREHEAD,TEMPLES AND SCALP TWICE DAILY FOR THREE WEEKS   Modified Medications    Modified Medication Previous Medication    ALLOPURINOL (ZYLOPRIM) 300 MG TABLET allopurinol (ZYLOPRIM) 300 mg tablet       Take 1 tablet (300 mg total) by mouth daily    Take 1 tablet (300 mg total) by mouth daily    LISINOPRIL (ZESTRIL) 20 MG TABLET lisinopril (ZESTRIL) 20 mg tablet       Take 1 tablet (20 mg total) by mouth daily    Take 1 tablet (20 mg total) by mouth daily    METOPROLOL SUCCINATE (TOPROL-XL) 50 MG 24 HR TABLET metoprolol succinate (TOPROL-XL) 50 mg 24 hr tablet       Take 1 tablet (50 mg total) by mouth 2 (two) times a day    Take 1 tablet (50 mg total) by mouth 2 (two) times a day    RIVAROXABAN (XARELTO) 20 MG TABLET rivaroxaban (Xarelto) 20 mg tablet       Take 1 tablet (20 mg total) by mouth daily with breakfast    Take 1 tablet (20 mg total) by mouth daily with breakfast   Discontinued Medications    MULTIPLE VITAMINS-MINERALS (CENTRUM SILVER 50+MEN) TABS    Take by mouth    NA SULFATE-K SULFATE-MG SULF (SUPREP BOWEL PREP KIT) 17 5-3 13-1 6 GM/177ML SOLN    Take 2 Bottles by mouth see administration instructions Please follow the instructions from the office    NON FORMULARY    Take 1 tablet by mouth once a week Vit b complex 2 x week    OXYCODONE-ACETAMINOPHEN (PERCOCET) 5-325 MG PER TABLET    Take 1 tablet by mouth every 4 (four) hours as needed for moderate pain for up to 15 dosesMax Daily Amount: 6 tablets     No Known Allergies  Past Medical History:   Diagnosis Date    Abnormal liver function test     DVT (deep venous thrombosis) (HCC)     left leg  2012 also has Factor V    Gouty arthritis     Polyp of sigmoid colon      Social History     Socioeconomic History    Marital status: /Civil Union     Spouse name: Not on file    Number of children: Not on file    Years of education: Not on file    Highest education level: Not on file   Occupational History    Not on file   Social Needs    Financial resource strain: Not on file    Food insecurity     Worry: Not on file     Inability: Not on file    Transportation needs     Medical: Not on file     Non-medical: Not on file   Tobacco Use    Smoking status: Never Smoker    Smokeless tobacco: Never Used   Substance and Sexual Activity    Alcohol use: Not Currently    Drug use: No    Sexual activity: Yes     Partners: Female     Birth control/protection: None   Lifestyle    Physical activity     Days per week: Not on file     Minutes per session: Not on file    Stress: Not on file   Relationships    Social connections     Talks on phone: Not on file     Gets together: Not on file     Attends Sabianism service: Not on file     Active member of club or organization: Not on file     Attends meetings of clubs or organizations: Not on file     Relationship status: Not on file    Intimate partner violence     Fear of current or ex partner: Not on file     Emotionally abused: Not on file     Physically abused: Not on file     Forced sexual activity: Not on file   Other Topics Concern    Not on file   Social History Narrative    Not on file     Immunization History   Administered Date(s) Administered    INFLUENZA 11/25/2014, 09/14/2016, 01/18/2018, 11/16/2018    Influenza Quadrivalent Preservative Free 3 years and older IM 01/18/2018    Influenza Quadrivalent, 6-35 Months IM 11/25/2014, 09/14/2016    Influenza, recombinant, quadrivalent,injectable, preservative free 11/16/2018, 11/22/2019, 10/09/2020    Influenza, seasonal, injectable 01/04/2011    SARS-CoV-2 / COVID-19 mRNA IM (Moderna) 03/20/2021, 04/17/2021    Td (adult), adsorbed 01/04/2011    Tdap 06/21/2020       PHQ-9 Depression Screening    PHQ-9:   Frequency of the following problems over the past two weeks:      Little interest or pleasure in doing things: 0 - not at all  Feeling down, depressed, or hopeless: 0 - not at all  PHQ-2 Score: 0         Objective:  /80   Pulse 63   Temp (!) 96 8 °F (36 °C)   Resp 14   Ht 5' 9" (1 753 m)   Wt 86 2 kg (190 lb)   SpO2 98%   BMI 28 06 kg/m²   BP Readings from Last 3 Encounters:   04/23/21 106/80   03/22/21 150/90   11/25/20 132/90      Wt Readings from Last 3 Encounters:   04/23/21 86 2 kg (190 lb)   03/22/21 89 4 kg (197 lb)   11/25/20 85 3 kg (188 lb)      Physical Exam  Constitutional:       General: He is not in acute distress  Appearance: Normal appearance  He is normal weight  He is not ill-appearing or toxic-appearing  HENT:      Head: Normocephalic and atraumatic  Right Ear: Tympanic membrane, ear canal and external ear normal       Left Ear: Tympanic membrane, ear canal and external ear normal    Eyes:      Extraocular Movements: Extraocular movements intact  Conjunctiva/sclera: Conjunctivae normal    Neck:      Musculoskeletal: Normal range of motion and neck supple  No neck rigidity  Cardiovascular:      Rate and Rhythm: Normal rate and regular rhythm  Pulses: Normal pulses  Heart sounds: Normal heart sounds  No murmur  No gallop  Pulmonary:      Effort: Pulmonary effort is normal       Breath sounds: Normal breath sounds  Abdominal:      General: Abdomen is flat  Bowel sounds are normal  There is no distension  Palpations: Abdomen is soft  Tenderness: There is no abdominal tenderness  There is no guarding     Musculoskeletal: Normal range of motion  General: No tenderness  Right lower leg: No edema  Left lower leg: No edema  Lymphadenopathy:      Cervical: No cervical adenopathy  Skin:     General: Skin is warm and dry  Findings: No erythema or rash  Neurological:      General: No focal deficit present  Mental Status: He is alert and oriented to person, place, and time  Gait: Gait normal    Psychiatric:         Mood and Affect: Mood normal          Behavior: Behavior normal         Lab Results   Component Value Date    WBC 13 65 (H) 10/09/2020    HGB 17 2 (H) 10/09/2020    HCT 49 5 (H) 10/09/2020     (H) 10/09/2020     10/09/2020     Lab Results   Component Value Date     12/01/2017    SODIUM 138 10/09/2020    K 3 9 10/09/2020     10/09/2020    CO2 25 10/09/2020    ANIONGAP 10 11/01/2015    AGAP 11 10/09/2020    BUN 12 10/09/2020    CREATININE 0 98 10/09/2020    GLUC 88 10/09/2020    CALCIUM 9 0 10/09/2020    AST 38 (H) 10/30/2020    ALT 44 10/30/2020    ALKPHOS 118 10/30/2020    PROT 6 5 12/01/2017    TP 6 4 10/30/2020    BILITOT 0 9 12/01/2017    TBILI 1 4 (H) 10/30/2020    EGFR 85 10/09/2020         Patient Care Team:  Cindy Brady MD as PCP - General (Family Medicine)  Kye Erickson MD as PCP - PCP-[facility] Elizabeth Alvarado MD    Note: Portions of the record may have been created with voice recognition software  Occasional wrong word or "sound a like" substitutions may have occurred due to the inherent limitations of voice recognition software  Read the chart carefully and recognize, using context, where substitutions have occurred

## 2021-04-23 ENCOUNTER — OFFICE VISIT (OUTPATIENT)
Dept: FAMILY MEDICINE CLINIC | Facility: CLINIC | Age: 61
End: 2021-04-23
Payer: COMMERCIAL

## 2021-04-23 VITALS
OXYGEN SATURATION: 98 % | DIASTOLIC BLOOD PRESSURE: 80 MMHG | WEIGHT: 190 LBS | TEMPERATURE: 96.8 F | HEART RATE: 63 BPM | RESPIRATION RATE: 14 BRPM | BODY MASS INDEX: 28.14 KG/M2 | HEIGHT: 69 IN | SYSTOLIC BLOOD PRESSURE: 106 MMHG

## 2021-04-23 DIAGNOSIS — I10 ESSENTIAL (PRIMARY) HYPERTENSION: ICD-10-CM

## 2021-04-23 DIAGNOSIS — D68.51 FACTOR V LEIDEN MUTATION (HCC): ICD-10-CM

## 2021-04-23 DIAGNOSIS — E74.39 GLUCOSE INTOLERANCE: ICD-10-CM

## 2021-04-23 DIAGNOSIS — E79.0 HYPERURICEMIA: ICD-10-CM

## 2021-04-23 DIAGNOSIS — Z79.01 ANTICOAGULANT LONG-TERM USE: ICD-10-CM

## 2021-04-23 DIAGNOSIS — D69.6 THROMBOCYTOPENIA (HCC): ICD-10-CM

## 2021-04-23 DIAGNOSIS — K21.9 GASTROESOPHAGEAL REFLUX DISEASE WITHOUT ESOPHAGITIS: ICD-10-CM

## 2021-04-23 DIAGNOSIS — Z00.00 ANNUAL PHYSICAL EXAM: Primary | ICD-10-CM

## 2021-04-23 DIAGNOSIS — K76.0 HEPATIC STEATOSIS: ICD-10-CM

## 2021-04-23 DIAGNOSIS — R31.29 OTHER MICROSCOPIC HEMATURIA: ICD-10-CM

## 2021-04-23 DIAGNOSIS — I10 ESSENTIAL HYPERTENSION: ICD-10-CM

## 2021-04-23 PROCEDURE — 99396 PREV VISIT EST AGE 40-64: CPT | Performed by: FAMILY MEDICINE

## 2021-04-23 PROCEDURE — 1036F TOBACCO NON-USER: CPT | Performed by: FAMILY MEDICINE

## 2021-04-23 PROCEDURE — 3008F BODY MASS INDEX DOCD: CPT | Performed by: FAMILY MEDICINE

## 2021-04-23 PROCEDURE — 3725F SCREEN DEPRESSION PERFORMED: CPT | Performed by: FAMILY MEDICINE

## 2021-04-23 RX ORDER — METOPROLOL SUCCINATE 50 MG/1
50 TABLET, EXTENDED RELEASE ORAL 2 TIMES DAILY
Qty: 180 TABLET | Refills: 3 | Status: SHIPPED | OUTPATIENT
Start: 2021-04-23 | End: 2021-08-20

## 2021-04-23 RX ORDER — LISINOPRIL 20 MG/1
20 TABLET ORAL DAILY
Qty: 90 TABLET | Refills: 3 | Status: SHIPPED | OUTPATIENT
Start: 2021-04-23 | End: 2021-10-01 | Stop reason: SDUPTHER

## 2021-04-23 RX ORDER — ALLOPURINOL 300 MG/1
300 TABLET ORAL DAILY
Qty: 90 TABLET | Refills: 3 | Status: SHIPPED | OUTPATIENT
Start: 2021-04-23 | End: 2021-10-01 | Stop reason: SDUPTHER

## 2021-04-23 RX ORDER — FLUOROURACIL 50 MG/G
CREAM TOPICAL
COMMUNITY
Start: 2021-03-26 | End: 2022-06-02 | Stop reason: ALTCHOICE

## 2021-04-23 RX ORDER — ALLOPURINOL 300 MG/1
300 TABLET ORAL DAILY
Qty: 90 TABLET | Refills: 3 | Status: SHIPPED | OUTPATIENT
Start: 2021-04-23 | End: 2021-04-23

## 2021-04-23 RX ORDER — LISINOPRIL 20 MG/1
20 TABLET ORAL DAILY
Qty: 90 TABLET | Refills: 3 | Status: SHIPPED | OUTPATIENT
Start: 2021-04-23 | End: 2021-04-23

## 2021-04-23 RX ORDER — METOPROLOL SUCCINATE 50 MG/1
50 TABLET, EXTENDED RELEASE ORAL 2 TIMES DAILY
Qty: 180 TABLET | Refills: 3 | Status: SHIPPED | OUTPATIENT
Start: 2021-04-23 | End: 2021-04-23

## 2021-04-23 NOTE — ASSESSMENT & PLAN NOTE
Lab Results   Component Value Date    HGBA1C 5 1 12/20/2019    HGBA1C 4 9 12/01/2017    HGBA1C 5 6 10/02/2014     Lab Results   Component Value Date    LDLCALC 101 (H) 12/20/2019    CREATININE 0 98 10/09/2020     Counseled on healthy eating, regular activity

## 2021-04-23 NOTE — ASSESSMENT & PLAN NOTE
BP Readings from Last 3 Encounters:   04/23/21 106/80   03/22/21 150/90   11/25/20 132/90     Controlled, continue lisinopril 20 mg and metoprolol succinate 50 mg twice daily

## 2021-04-23 NOTE — ASSESSMENT & PLAN NOTE
History of Factory V Leiden and multiple DVTs years ago after prolonged drive in 52/[address]  Had seen hematologist in the past (Dr Roman Robledo)  On anticoagulation with Xarelto 20mg daily

## 2021-04-23 NOTE — PATIENT INSTRUCTIONS

## 2021-07-12 DIAGNOSIS — D68.51 FACTOR V LEIDEN MUTATION (HCC): ICD-10-CM

## 2021-07-12 RX ORDER — RIVAROXABAN 20 MG/1
TABLET, FILM COATED ORAL
Qty: 90 TABLET | Refills: 3 | Status: SHIPPED | OUTPATIENT
Start: 2021-07-12 | End: 2021-07-13

## 2021-07-13 DIAGNOSIS — D68.51 FACTOR V LEIDEN MUTATION (HCC): ICD-10-CM

## 2021-07-13 RX ORDER — RIVAROXABAN 20 MG/1
TABLET, FILM COATED ORAL
Qty: 90 TABLET | Refills: 3 | Status: SHIPPED | OUTPATIENT
Start: 2021-07-13 | End: 2022-05-10 | Stop reason: SDUPTHER

## 2021-08-17 NOTE — TELEPHONE ENCOUNTER
INR is high, change to 0mg on both Sat and Sun, and 4mg on Mon-Fri, recheck 2 weeks Gestational Diabetes/Abnormal Fetal Surveillance

## 2021-08-20 DIAGNOSIS — I10 ESSENTIAL (PRIMARY) HYPERTENSION: ICD-10-CM

## 2021-08-20 RX ORDER — METOPROLOL SUCCINATE 50 MG/1
TABLET, EXTENDED RELEASE ORAL
Qty: 180 TABLET | Refills: 3 | Status: SHIPPED | OUTPATIENT
Start: 2021-08-20 | End: 2022-05-10 | Stop reason: SDUPTHER

## 2021-10-01 DIAGNOSIS — E79.0 HYPERURICEMIA: ICD-10-CM

## 2021-10-01 DIAGNOSIS — I10 ESSENTIAL (PRIMARY) HYPERTENSION: ICD-10-CM

## 2021-10-01 RX ORDER — LISINOPRIL 20 MG/1
20 TABLET ORAL DAILY
Qty: 90 TABLET | Refills: 2 | Status: SHIPPED | OUTPATIENT
Start: 2021-10-01 | End: 2022-01-07 | Stop reason: SDUPTHER

## 2021-10-01 RX ORDER — ALLOPURINOL 300 MG/1
300 TABLET ORAL DAILY
Qty: 90 TABLET | Refills: 2 | Status: SHIPPED | OUTPATIENT
Start: 2021-10-01 | End: 2022-05-10 | Stop reason: SDUPTHER

## 2022-01-07 DIAGNOSIS — I10 ESSENTIAL (PRIMARY) HYPERTENSION: ICD-10-CM

## 2022-01-07 RX ORDER — LISINOPRIL 20 MG/1
20 TABLET ORAL DAILY
Qty: 10 TABLET | Refills: 1 | Status: SHIPPED | OUTPATIENT
Start: 2022-01-07 | End: 2022-05-10 | Stop reason: SDUPTHER

## 2022-01-07 NOTE — TELEPHONE ENCOUNTER
Patient is waiting for meds to arrive from [facility]  He is requesting an emergency supply of Lisinopril      [facility]

## 2022-02-11 NOTE — TELEPHONE ENCOUNTER
I double checked with pharmacist and for Xarelto he can take it any time of the day with or without food, just for some people taking it with food is less irritating to the stomach, but if it isnt an issue for him, he can take it anytime
Patient called   He used to take his warfarin HS, but he noticed the xarelto bottle said take in AM w/ breakfast  He wants to know if it really matters what time of day he takes this med
Patient informed
[id number] [facility]

## 2022-03-02 ENCOUNTER — TELEPHONE (OUTPATIENT)
Dept: FAMILY MEDICINE CLINIC | Facility: CLINIC | Age: 62
End: 2022-03-02

## 2022-03-02 NOTE — TELEPHONE ENCOUNTER
Patient took a home test today and is tested positive for covid but he is asking if he can take xarelto and what meds he has to stop taking

## 2022-03-02 NOTE — TELEPHONE ENCOUNTER
He can continue all medications  COVID home care guidelines below:    Your COVID test is positive  You need to stay home, isolate in a "sick room" or area and away from other people  Use a separate bathroom, if available  Wear a mask when leaving your room  Guidelines for isolation are for 5 days from onset of symptoms, as long as you are fever free  You should then wear your mask for 5 days  You can take over the counter medications  It is important to rest, stay hydrated  Please schedule a virtual visit if you have further questions

## 2022-05-10 DIAGNOSIS — I10 ESSENTIAL (PRIMARY) HYPERTENSION: ICD-10-CM

## 2022-05-10 DIAGNOSIS — E79.0 HYPERURICEMIA: ICD-10-CM

## 2022-05-10 DIAGNOSIS — Z00.00 ANNUAL PHYSICAL EXAM: Primary | ICD-10-CM

## 2022-05-10 DIAGNOSIS — D68.51 FACTOR V LEIDEN MUTATION (HCC): ICD-10-CM

## 2022-05-10 RX ORDER — LISINOPRIL 20 MG/1
20 TABLET ORAL DAILY
Qty: 90 TABLET | Refills: 0 | Status: SHIPPED | OUTPATIENT
Start: 2022-05-10 | End: 2022-06-02 | Stop reason: SDUPTHER

## 2022-05-10 RX ORDER — ALLOPURINOL 300 MG/1
300 TABLET ORAL DAILY
Qty: 90 TABLET | Refills: 0 | Status: SHIPPED | OUTPATIENT
Start: 2022-05-10 | End: 2022-06-02 | Stop reason: SDUPTHER

## 2022-05-10 RX ORDER — METOPROLOL SUCCINATE 50 MG/1
50 TABLET, EXTENDED RELEASE ORAL 2 TIMES DAILY
Qty: 180 TABLET | Refills: 0 | Status: SHIPPED | OUTPATIENT
Start: 2022-05-10 | End: 2022-06-02 | Stop reason: SDUPTHER

## 2022-05-10 NOTE — TELEPHONE ENCOUNTER
90, no refills  Patient OVERDUE for follow-up    Please have him schedule   Labs ordered today to do before appointment, please mail slips if patient goes to lab outside of the system  No further refills without appointment

## 2022-05-20 LAB
ALBUMIN SERPL-MCNC: 4.6 G/DL (ref 3.6–5.1)
ALBUMIN/GLOB SERPL: 2.1 (CALC) (ref 1–2.5)
ALP SERPL-CCNC: 62 U/L (ref 35–144)
ALT SERPL-CCNC: 19 U/L (ref 9–46)
AST SERPL-CCNC: 22 U/L (ref 10–35)
BASOPHILS # BLD AUTO: 69 CELLS/UL (ref 0–200)
BASOPHILS NFR BLD AUTO: 1.1 %
BILIRUB SERPL-MCNC: 0.7 MG/DL (ref 0.2–1.2)
BUN SERPL-MCNC: 10 MG/DL (ref 7–25)
BUN/CREAT SERPL: NORMAL (CALC) (ref 6–22)
CALCIUM SERPL-MCNC: 9.4 MG/DL (ref 8.6–10.3)
CHLORIDE SERPL-SCNC: 105 MMOL/L (ref 98–110)
CHOLEST SERPL-MCNC: 174 MG/DL
CHOLEST/HDLC SERPL: 3.1 (CALC)
CO2 SERPL-SCNC: 29 MMOL/L (ref 20–32)
CREAT SERPL-MCNC: 0.92 MG/DL (ref 0.7–1.33)
EOSINOPHIL # BLD AUTO: 365 CELLS/UL (ref 15–500)
EOSINOPHIL NFR BLD AUTO: 5.8 %
ERYTHROCYTE [DISTWIDTH] IN BLOOD BY AUTOMATED COUNT: 13.8 % (ref 11–15)
GLOBULIN SER CALC-MCNC: 2.2 G/DL (CALC) (ref 1.9–3.7)
GLUCOSE SERPL-MCNC: 68 MG/DL (ref 65–99)
HCT VFR BLD AUTO: 49 % (ref 38.5–50)
HDLC SERPL-MCNC: 56 MG/DL
HGB BLD-MCNC: 17.4 G/DL (ref 13.2–17.1)
LDLC SERPL CALC-MCNC: 91 MG/DL (CALC)
LYMPHOCYTES # BLD AUTO: 2552 CELLS/UL (ref 850–3900)
LYMPHOCYTES NFR BLD AUTO: 40.5 %
MCH RBC QN AUTO: 32.5 PG (ref 27–33)
MCHC RBC AUTO-ENTMCNC: 35.5 G/DL (ref 32–36)
MCV RBC AUTO: 91.4 FL (ref 80–100)
MONOCYTES # BLD AUTO: 441 CELLS/UL (ref 200–950)
MONOCYTES NFR BLD AUTO: 7 %
NEUTROPHILS # BLD AUTO: 2873 CELLS/UL (ref 1500–7800)
NEUTROPHILS NFR BLD AUTO: 45.6 %
NONHDLC SERPL-MCNC: 118 MG/DL (CALC)
PLATELET # BLD AUTO: 133 THOUSAND/UL (ref 140–400)
PMV BLD REES-ECKER: 11.3 FL (ref 7.5–12.5)
POTASSIUM SERPL-SCNC: 4.2 MMOL/L (ref 3.5–5.3)
PROT SERPL-MCNC: 6.8 G/DL (ref 6.1–8.1)
RBC # BLD AUTO: 5.36 MILLION/UL (ref 4.2–5.8)
SL AMB EGFR AFRICAN AMERICAN: 105 ML/MIN/1.73M2
SL AMB EGFR NON AFRICAN AMERICAN: 91 ML/MIN/1.73M2
SODIUM SERPL-SCNC: 141 MMOL/L (ref 135–146)
TRIGL SERPL-MCNC: 168 MG/DL
WBC # BLD AUTO: 6.3 THOUSAND/UL (ref 3.8–10.8)

## 2022-05-23 ENCOUNTER — RA CDI HCC (OUTPATIENT)
Dept: OTHER | Facility: HOSPITAL | Age: 62
End: 2022-05-23

## 2022-05-23 NOTE — PROGRESS NOTES
[facility]  coding opportunities       Chart reviewed, no opportunity found: CHART REVIEWED, NO OPPORTUNITY FOUND        Patients Insurance        Commercial Insurance: [address] [place] [address]

## 2022-06-01 PROBLEM — E78.1 HYPERTRIGLYCERIDEMIA: Status: ACTIVE | Noted: 2022-06-01

## 2022-06-01 RX ORDER — OMEPRAZOLE 20 MG/1
CAPSULE, DELAYED RELEASE ORAL
COMMUNITY
End: 2022-06-02 | Stop reason: ALTCHOICE

## 2022-06-01 NOTE — PROGRESS NOTES
ANNUAL PHYSICAL    Date of Service: 22  Primary Care Provider:   Serene De Los Santos MD       Name: Eri Oliva       : 1960       Age:61 y o  Sex: male      MRN: [id number]      Chief Complaint:Physical Exam     Assessment and Plan:  63 y o  male exam      1  Health Maintenance  - Colon Cancer Screening: last colonoscopy in 2020, repeat in 5 years  - Prostate Cancer Screen: Reviewed risks/benefits of screening and patient declines at this time  - Labs: done previously  - Immunizations: Reviewed  Recommend yearly flu vaccine      2  Other diagnoses addressed today:   Problem List Items Addressed This Visit        Digestive    Hepatic steatosis    Gastroesophageal reflux disease     On as needed Pepcid, which he reports he rarely needs              Cardiovascular and Mediastinum    Primary hypertension     BP Readings from Last 3 Encounters:   22 122/74   21 106/80   21 150/90     Controlled, continue lisinopril 20 mg and metoprolol succinate 50 mg twice daily           Relevant Medications    metoprolol succinate (TOPROL-XL) 50 mg 24 hr tablet    lisinopril (ZESTRIL) 20 mg tablet       Musculoskeletal and Integument    Chronic idiopathic gout involving toe of right foot without tophus     Controlled, continue allopurinol 300 mg daily            Relevant Medications    allopurinol (ZYLOPRIM) 300 mg tablet       Hematopoietic and Hemostatic    Factor V Leiden mutation ([facility] )     History of Factory V Leiden and multiple DVTs years ago after prolonged drive in [facility]/[address]  Had seen hematologist in the past (Dr Larry Cohen)  On anticoagulation with Xarelto 20mg daily           Relevant Medications    rivaroxaban (Xarelto) 20 mg tablet       Other    Anticoagulant long-term use    Thrombocytopenia (HCC)     Stable, no signs or symptoms of bleeding  Has seen Heme/Onc in the past           Overweight with body mass index (BMI) of 28 to 28 9 in adult    Abnormal hemoglobin (Hgb) ([facility] ) Mild polycythemia, which has been overall stable           Hypertriglyceridemia     Mildly elevated triglycerides, discussed healthy lifestyle             Other Visit Diagnoses     Annual physical exam    -  Primary           Immunizations and preventive care screenings were discussed with patient today  Appropriate education was printed on patient's after visit summary  Counseling:  Alcohol/drug use: discussed moderation in alcohol intake, the recommendations for healthy alcohol use, and avoidance of illicit drug use  Dental Health: discussed importance of regular tooth brushing, flossing, and dental visits  Injury prevention: discussed safety/seat belts, safety helmets, smoke detectors, carbon dioxide detectors    Exercise: the importance of regular exercise/physical activity was discussed  Recommend exercise 3-5 times per week for at least 30 minutes  BMI Counseling: Body mass index is 28 94 kg/m²  The BMI is above normal  Nutrition recommendations include decreasing portion sizes, encouraging healthy choices of fruits and vegetables, moderation in carbohydrate intake and reducing intake of saturated and trans fat  Exercise recommendations include moderate physical activity 150 minutes/week  Rationale for BMI follow-up plan is due to patient being overweight or obese  Depression Screening and Follow-up Plan: Patient was screened for depression during today's encounter  They screened negative with a PHQ-2 score of 0  RTC 1 year for annual  visit or sooner PRN    Subjective:    Oni Santos is a 63 y o  male and is here for his comprehensive physical exam      Acute complaints: none  He follows with cardiology for history of hypertension  He is on lisinopril 20 mg and metoprolol succinate 50 mg twice daily  He remains on Xarelto for history of Factor V Leiden and DVTs  Diet and Physical Activity  Diet/Nutrition: does not follow a well balanced diet     Exercise: no formal exercise  Plays golf once a week  General Health  Vision: no vision problems and goes for regular eye exams  Dental: regular dental visits          Histories Updated and Reviewed 6/2/2022:  Patient's Medications   New Prescriptions    No medications on file   Previous Medications    FAMOTIDINE (PEPCID) 20 MG TABLET    Take 1 tablet (20 mg total) by mouth 2 (two) times a day   Modified Medications    Modified Medication Previous Medication    ALLOPURINOL (ZYLOPRIM) 300 MG TABLET allopurinol (ZYLOPRIM) 300 mg tablet       Take 1 tablet (300 mg total) by mouth daily    Take 1 tablet (300 mg total) by mouth daily    LISINOPRIL (ZESTRIL) 20 MG TABLET lisinopril (ZESTRIL) 20 mg tablet       Take 1 tablet (20 mg total) by mouth daily    Take 1 tablet (20 mg total) by mouth daily    METOPROLOL SUCCINATE (TOPROL-XL) 50 MG 24 HR TABLET metoprolol succinate (TOPROL-XL) 50 mg 24 hr tablet       Take 1 tablet (50 mg total) by mouth 2 (two) times a day    Take 1 tablet (50 mg total) by mouth 2 (two) times a day    RIVAROXABAN (XARELTO) 20 MG TABLET rivaroxaban (Xarelto) 20 mg tablet       Take 1 tablet (20 mg total) by mouth daily with breakfast    Take 1 tablet (20 mg total) by mouth daily with breakfast   Discontinued Medications    FLUOROURACIL (EFUDEX) 5 % CREAM    APPLY TO FOREHEAD,TEMPLES AND SCALP TWICE DAILY FOR THREE WEEKS    OMEPRAZOLE (PRILOSEC) 20 MG DELAYED RELEASE CAPSULE    omeprazole 20 mg capsule,delayed release     No Known Allergies  Past Medical History:   Diagnosis Date    Abnormal liver function test     DVT (deep venous thrombosis) (HCC)     left leg  2012 also has Factor V    Gouty arthritis     Polyp of sigmoid colon      Social History     Socioeconomic History    Marital status: /Civil Union     Spouse name: Not on file    Number of children: Not on file    Years of education: Not on file    Highest education level: Not on file   Occupational History    Not on file   Tobacco Use    Smoking status: Never Smoker    Smokeless tobacco: Never Used   Vaping Use    Vaping Use: Never used   Substance and Sexual Activity    Alcohol use: Not Currently    Drug use: No    Sexual activity: Yes     Partners: Female     Birth control/protection: None   Other Topics Concern    Not on file   Social History Narrative    Not on file     Social Determinants of Health     Financial Resource Strain: Not on file   Food Insecurity: Not on file   Transportation Needs: Not on file   Physical Activity: Not on file   Stress: Not on file   Social Connections: Not on file   Intimate Partner Violence: Not on file   Housing Stability: Not on file     Immunization History   Administered Date(s) Administered    COVID-19 MODERNA VACC 0 5 ML IM 03/20/2021, 04/17/2021, 11/26/2021    INFLUENZA 11/25/2014, 09/14/2016, 01/18/2018, 11/16/2018, 11/22/2019, 10/09/2020    Influenza Quadrivalent Preservative Free 3 years and older IM 01/18/2018    Influenza Quadrivalent, 6-35 Months IM 11/25/2014, 09/14/2016    Influenza, recombinant, quadrivalent,injectable, preservative free 11/16/2018, 11/22/2019, 10/09/2020    Influenza, seasonal, injectable 01/04/2011    Td (adult), adsorbed 01/04/2011    Tdap 06/21/2020       PHQ-2/9 Depression Screening    Little interest or pleasure in doing things: 0 - not at all  Feeling down, depressed, or hopeless: 0 - not at all  PHQ-2 Score: 0  PHQ-2 Interpretation: Negative depression screen         Objective:  /74   Pulse 78   Temp (!) 96 4 °F (35 8 °C)   Resp 16   Ht 5' 9" (1 753 m)   Wt 88 9 kg (196 lb)   BMI 28 94 kg/m²   BP Readings from Last 3 Encounters:   06/02/22 122/74   04/23/21 106/80   03/22/21 150/90      Wt Readings from Last 3 Encounters:   06/02/22 88 9 kg (196 lb)   04/23/21 86 2 kg (190 lb)   03/22/21 89 4 kg (197 lb)      Physical Exam  Constitutional:       General: He is not in acute distress  Appearance: Normal appearance   He is not ill-appearing or toxic-appearing  HENT:      Head: Normocephalic and atraumatic  Right Ear: Tympanic membrane, ear canal and external ear normal       Left Ear: Tympanic membrane, ear canal and external ear normal       Nose: Nose normal       Mouth/Throat:      Mouth: Mucous membranes are moist    Eyes:      Extraocular Movements: Extraocular movements intact  Conjunctiva/sclera: Conjunctivae normal    Cardiovascular:      Rate and Rhythm: Normal rate and regular rhythm  Pulses: Normal pulses  Heart sounds: Normal heart sounds  No murmur heard  No friction rub  No gallop  Pulmonary:      Effort: Pulmonary effort is normal  No respiratory distress  Breath sounds: Normal breath sounds  No stridor  No wheezing, rhonchi or rales  Abdominal:      General: There is no distension  Palpations: Abdomen is soft  Tenderness: There is no abdominal tenderness  There is no guarding or rebound  Musculoskeletal:      Cervical back: Normal range of motion and neck supple  No rigidity  Right lower leg: No edema  Left lower leg: No edema  Lymphadenopathy:      Cervical: No cervical adenopathy  Skin:     General: Skin is warm and dry  Findings: No erythema or rash  Neurological:      General: No focal deficit present  Mental Status: He is alert and oriented to person, place, and time     Psychiatric:         Mood and Affect: Mood normal          Behavior: Behavior normal            Lab Results   Component Value Date    WBC 6 3 05/20/2022    HGB 17 4 (H) 05/20/2022    HCT 49 0 05/20/2022    MCV 91 4 05/20/2022     (L) 05/20/2022     Lab Results   Component Value Date    SODIUM 141 05/20/2022    K 4 2 05/20/2022     05/20/2022    CO2 29 05/20/2022    BUN 10 05/20/2022    CREATININE 0 92 05/20/2022    GLUC 68 05/20/2022    CALCIUM 9 4 05/20/2022     Lab Results   Component Value Date    ALT 19 05/20/2022    AST 22 05/20/2022    GGT 88 06/17/2016    ALKPHOS 62 05/20/2022 BILITOT 0 9 12/01/2017     Lab Results   Component Value Date    CHOLESTEROL 174 05/20/2022    CHOLESTEROL 174 12/20/2019    CHOLESTEROL 184 11/20/2018     Lab Results   Component Value Date    HDL 56 05/20/2022    HDL 55 12/20/2019    HDL 61 11/20/2018     Lab Results   Component Value Date    TRIG 168 (H) 05/20/2022    TRIG 86 12/20/2019    TRIG 80 11/20/2018     Lab Results   Component Value Date    [place] 129 12/01/2017     Lab Results   Component Value Date    [address] 05/20/2022         Patient Care Team:  Beatriz Morejon MD as PCP - General (Family Medicine)  Bobby Aragon MD as PCP - PCP-[facility] Shant Sethi MD    Note: Portions of the record may have been created with voice recognition software  Occasional wrong word or "sound a like" substitutions may have occurred due to the inherent limitations of voice recognition software  Read the chart carefully and recognize, using context, where substitutions have occurred

## 2022-06-01 NOTE — ASSESSMENT & PLAN NOTE
Lab Results   Component Value Date    HGBA1C 5 1 12/20/2019    HGBA1C 4 9 12/01/2017    HGBA1C 5 6 10/02/2014     Lab Results   Component Value Date    LDLCALC 91 05/20/2022    CREATININE 0 92 05/20/2022     Counseled on healthy eating, regular activity

## 2022-06-01 NOTE — ASSESSMENT & PLAN NOTE
History of Factory V Leiden and multiple DVTs years ago after prolonged drive in [address]  Had seen hematologist in the past (Dr Mcdonald Ip)  On anticoagulation with Xarelto 20mg daily

## 2022-06-02 ENCOUNTER — OFFICE VISIT (OUTPATIENT)
Dept: FAMILY MEDICINE CLINIC | Facility: CLINIC | Age: 62
End: 2022-06-02
Payer: COMMERCIAL

## 2022-06-02 VITALS
HEIGHT: 69 IN | SYSTOLIC BLOOD PRESSURE: 122 MMHG | TEMPERATURE: 96.4 F | DIASTOLIC BLOOD PRESSURE: 74 MMHG | WEIGHT: 196 LBS | HEART RATE: 78 BPM | BODY MASS INDEX: 29.03 KG/M2 | RESPIRATION RATE: 16 BRPM

## 2022-06-02 DIAGNOSIS — K21.9 GASTROESOPHAGEAL REFLUX DISEASE WITHOUT ESOPHAGITIS: ICD-10-CM

## 2022-06-02 DIAGNOSIS — E78.1 HYPERTRIGLYCERIDEMIA: ICD-10-CM

## 2022-06-02 DIAGNOSIS — D58.2 ABNORMAL HEMOGLOBIN (HGB) (HCC): ICD-10-CM

## 2022-06-02 DIAGNOSIS — Z00.00 ANNUAL PHYSICAL EXAM: Primary | ICD-10-CM

## 2022-06-02 DIAGNOSIS — I10 PRIMARY HYPERTENSION: ICD-10-CM

## 2022-06-02 DIAGNOSIS — K76.0 HEPATIC STEATOSIS: ICD-10-CM

## 2022-06-02 DIAGNOSIS — D68.51 FACTOR V LEIDEN MUTATION (HCC): ICD-10-CM

## 2022-06-02 DIAGNOSIS — E66.3 OVERWEIGHT WITH BODY MASS INDEX (BMI) OF 28 TO 28.9 IN ADULT: ICD-10-CM

## 2022-06-02 DIAGNOSIS — M1A.0710 CHRONIC IDIOPATHIC GOUT INVOLVING TOE OF RIGHT FOOT WITHOUT TOPHUS: ICD-10-CM

## 2022-06-02 DIAGNOSIS — Z79.01 ANTICOAGULANT LONG-TERM USE: ICD-10-CM

## 2022-06-02 DIAGNOSIS — D69.6 THROMBOCYTOPENIA (HCC): ICD-10-CM

## 2022-06-02 PROBLEM — R13.19 ESOPHAGEAL DYSPHAGIA: Status: RESOLVED | Noted: 2019-01-17 | Resolved: 2022-06-02

## 2022-06-02 PROCEDURE — 3008F BODY MASS INDEX DOCD: CPT | Performed by: FAMILY MEDICINE

## 2022-06-02 PROCEDURE — 3725F SCREEN DEPRESSION PERFORMED: CPT | Performed by: FAMILY MEDICINE

## 2022-06-02 PROCEDURE — 99396 PREV VISIT EST AGE 40-64: CPT | Performed by: FAMILY MEDICINE

## 2022-06-02 PROCEDURE — 1036F TOBACCO NON-USER: CPT | Performed by: FAMILY MEDICINE

## 2022-06-02 RX ORDER — ALLOPURINOL 300 MG/1
300 TABLET ORAL DAILY
Qty: 90 TABLET | Refills: 3 | Status: SHIPPED | OUTPATIENT
Start: 2022-06-02

## 2022-06-02 RX ORDER — METOPROLOL SUCCINATE 50 MG/1
50 TABLET, EXTENDED RELEASE ORAL 2 TIMES DAILY
Qty: 180 TABLET | Refills: 3 | Status: SHIPPED | OUTPATIENT
Start: 2022-06-02

## 2022-06-02 RX ORDER — LISINOPRIL 20 MG/1
20 TABLET ORAL DAILY
Qty: 90 TABLET | Refills: 3 | Status: SHIPPED | OUTPATIENT
Start: 2022-06-02

## 2022-06-16 NOTE — ASSESSMENT & PLAN NOTE
Pt had been on Ranitidine for GERD in the past, but given dysphagia and early satiety would recommend GI referral for EGD, he has seen Angelika in the past  Offerred to start Y5jobpalw or PPI prior to visit, but pt would like to wait until he is evaluated first  History/Exam/Medical decision making

## 2022-10-03 ENCOUNTER — VBI (OUTPATIENT)
Dept: ADMINISTRATIVE | Facility: OTHER | Age: 62
End: 2022-10-03

## 2023-02-27 DIAGNOSIS — D68.51 FACTOR V LEIDEN MUTATION (HCC): ICD-10-CM

## 2023-02-27 DIAGNOSIS — M1A.0710 CHRONIC IDIOPATHIC GOUT INVOLVING TOE OF RIGHT FOOT WITHOUT TOPHUS: ICD-10-CM

## 2023-02-27 DIAGNOSIS — I10 PRIMARY HYPERTENSION: ICD-10-CM

## 2023-02-27 RX ORDER — METOPROLOL SUCCINATE 50 MG/1
50 TABLET, EXTENDED RELEASE ORAL 2 TIMES DAILY
Qty: 180 TABLET | Refills: 3 | Status: SHIPPED | OUTPATIENT
Start: 2023-02-27

## 2023-02-27 RX ORDER — ALLOPURINOL 300 MG/1
300 TABLET ORAL DAILY
Qty: 90 TABLET | Refills: 3 | Status: SHIPPED | OUTPATIENT
Start: 2023-02-27

## 2023-02-27 RX ORDER — LISINOPRIL 20 MG/1
20 TABLET ORAL DAILY
Qty: 90 TABLET | Refills: 3 | Status: SHIPPED | OUTPATIENT
Start: 2023-02-27

## 2023-03-22 ENCOUNTER — CLINICAL SUPPORT (OUTPATIENT)
Dept: FAMILY MEDICINE CLINIC | Facility: CLINIC | Age: 63
End: 2023-03-22

## 2023-03-22 DIAGNOSIS — Z23 ENCOUNTER FOR IMMUNIZATION: Primary | ICD-10-CM

## 2023-06-08 ENCOUNTER — OFFICE VISIT (OUTPATIENT)
Dept: FAMILY MEDICINE CLINIC | Facility: CLINIC | Age: 63
End: 2023-06-08
Payer: COMMERCIAL

## 2023-06-08 VITALS
WEIGHT: 201 LBS | HEART RATE: 86 BPM | DIASTOLIC BLOOD PRESSURE: 88 MMHG | BODY MASS INDEX: 30.46 KG/M2 | OXYGEN SATURATION: 95 % | TEMPERATURE: 97.9 F | SYSTOLIC BLOOD PRESSURE: 138 MMHG | HEIGHT: 68 IN

## 2023-06-08 DIAGNOSIS — Z00.00 ANNUAL PHYSICAL EXAM: Primary | ICD-10-CM

## 2023-06-08 DIAGNOSIS — K21.9 GASTROESOPHAGEAL REFLUX DISEASE WITHOUT ESOPHAGITIS: ICD-10-CM

## 2023-06-08 DIAGNOSIS — R13.10 DYSPHAGIA, UNSPECIFIED TYPE: ICD-10-CM

## 2023-06-08 DIAGNOSIS — E78.1 HYPERTRIGLYCERIDEMIA: ICD-10-CM

## 2023-06-08 DIAGNOSIS — I10 PRIMARY HYPERTENSION: ICD-10-CM

## 2023-06-08 DIAGNOSIS — K22.2 SCHATZKI'S RING: ICD-10-CM

## 2023-06-08 DIAGNOSIS — Z12.5 SCREENING PSA (PROSTATE SPECIFIC ANTIGEN): ICD-10-CM

## 2023-06-08 DIAGNOSIS — K76.0 HEPATIC STEATOSIS: ICD-10-CM

## 2023-06-08 DIAGNOSIS — M25.812 IMPINGEMENT OF LEFT SHOULDER: ICD-10-CM

## 2023-06-08 PROCEDURE — 20610 DRAIN/INJ JOINT/BURSA W/O US: CPT | Performed by: FAMILY MEDICINE

## 2023-06-08 PROCEDURE — 99396 PREV VISIT EST AGE 40-64: CPT | Performed by: FAMILY MEDICINE

## 2023-06-08 RX ORDER — TRIAMCINOLONE ACETONIDE 40 MG/ML
40 INJECTION, SUSPENSION INTRA-ARTICULAR; INTRAMUSCULAR ONCE
Status: COMPLETED | OUTPATIENT
Start: 2023-06-08 | End: 2023-06-09

## 2023-06-08 NOTE — ASSESSMENT & PLAN NOTE
Previously followed with gastroenterology  Symptoms have returned over the past 6 months    Referral made to gastroenterology

## 2023-06-08 NOTE — PROGRESS NOTES
ADULT ANNUAL [address]    NAME: Shabnam Castillo  AGE: 63 y o  SEX: male  : 1960     DATE: 2023     Assessment and Plan:     Problem List Items Addressed This Visit        Digestive    Hepatic steatosis     Stable repeat labs         Relevant Orders    Comprehensive metabolic panel    Gastroesophageal reflux disease    Schatzki's ring     Previously followed with gastroenterology  Symptoms have returned over the past 6 months  Referral made to gastroenterology         Relevant Orders    Ambulatory Referral to Gastroenterology       Cardiovascular and Mediastinum    Primary hypertension     Currently stable  Continue current medications  Relevant Orders    CBC and differential    Comprehensive metabolic panel       Other    Hypertriglyceridemia    Relevant Orders    Lipid panel    Impingement of left shoulder    Relevant Medications    triamcinolone acetonide (KENALOG-40) 40 mg/mL injection 40 mg (Completed)    Other Relevant Orders    Large joint arthrocentesis: L subacromial bursa (Completed)   Other Visit Diagnoses     Annual physical exam    -  Primary    Screening PSA (prostate specific antigen)        Relevant Orders    PSA, Total Screen    Dysphagia, unspecified type        Relevant Orders    Ambulatory Referral to Gastroenterology        Large joint arthrocentesis: L subacromial bursa  Universal Protocol:  Consent: Verbal consent obtained    Risks and benefits: risks, benefits and alternatives were discussed  Consent given by: patient  Patient understanding: patient states understanding of the procedure being performed  Patient consent: the patient's understanding of the procedure matches consent given  Site marked: the operative site was marked  Patient identity confirmed: verbally with patient    Supporting Documentation  Indications: pain   Procedure Details  Location: shoulder - L subacromial bursa  Preparation: Patient was prepped and draped in the usual sterile fashion  Needle size: 22 G  Ultrasound guidance: no  Approach: posterolateral    Aspirate: clear    Patient tolerance: patient tolerated the procedure well with no immediate complications  Dressing:  Sterile dressing applied            Immunizations and preventive care screenings were discussed with patient today  Appropriate education was printed on patient's after visit summary  Discussed risks and benefits of prostate cancer screening  We discussed the controversial history of PSA screening for prostate cancer in the [place] as well as the risk of over detection and over treatment of prostate cancer by way of PSA screening  The patient understands that PSA blood testing is an imperfect way to screen for prostate cancer and that elevated PSA levels in the blood may also be caused by infection, inflammation, prostatic trauma or manipulation, urological procedures, or by benign prostatic enlargement  The role of the digital rectal examination in prostate cancer screening was also discussed and I discussed with him that there is large interobserver variability in the findings of digital rectal examination  Counseling:  Alcohol/drug use: discussed moderation in alcohol intake, the recommendations for healthy alcohol use, and avoidance of illicit drug use  Dental Health: discussed importance of regular tooth brushing, flossing, and dental visits  Injury prevention: discussed safety/seat belts, safety helmets, smoke detectors, carbon dioxide detectors, and smoking near bedding or upholstery  Sexual health: discussed sexually transmitted diseases, partner selection, use of condoms, avoidance of unintended pregnancy, and contraceptive alternatives  Exercise: the importance of regular exercise/physical activity was discussed  Recommend exercise 3-5 times per week for at least 30 minutes            Return in about 1 year (around 6/8/2024) for Annual physical      Chief Complaint:     Chief Complaint   Patient presents with   • Physical Exam      History of Present Illness:     Adult Annual Physical   Patient here for a comprehensive physical exam  The patient reports no problems  Diet and Physical Activity  Diet/Nutrition: well balanced diet  Exercise: walking  Depression Screening  PHQ-2/9 Depression Screening    Little interest or pleasure in doing things: 0 - not at all  Feeling down, depressed, or hopeless: 0 - not at all  PHQ-2 Score: 0  PHQ-2 Interpretation: Negative depression screen       General Health  Sleep: sleeps poorly, gets 4-6 hours of sleep on average and shoulder affects sleep  Hearing: decreased - bilateral   Vision: goes for regular eye exams and wears contacts  Dental: regular dental visits   Health  Symptoms include: none     Review of Systems:     Review of Systems   Constitutional: Negative for fatigue and fever  HENT: Positive for trouble swallowing  Negative for sore throat  Eyes: Negative for visual disturbance  Respiratory: Negative for cough, chest tightness and shortness of breath  Cardiovascular: Negative for chest pain, palpitations and leg swelling  Gastrointestinal: Negative for abdominal pain, constipation, diarrhea and nausea  Endocrine: Negative for cold intolerance and heat intolerance  Genitourinary: Negative for flank pain  Musculoskeletal: Negative for back pain and neck pain  Left shoulder pain and stiffness   Skin: Negative for rash  Neurological: Negative for headaches  Psychiatric/Behavioral: Negative for behavioral problems and confusion        Past Medical History:     Past Medical History:   Diagnosis Date   • Abnormal liver function test    • DVT (deep venous thrombosis) (HCC)     left leg  2012 also has Factor V   • Gouty arthritis    • Polyp of sigmoid colon       Past Surgical History:     Past Surgical History:   Procedure Laterality Date   • COLONOSCOPY     • UMBILICAL HERNIA REPAIR     • UPPER GASTROINTESTINAL ENDOSCOPY     • WISDOM TOOTH EXTRACTION        Family History:     Family History   Problem Relation Age of Onset   • Breast cancer Mother    • Hypertension Mother    • Lung cancer Mother    • Coronary artery disease Father    • Hypertension Father    • Hypertension Brother    • Leukemia Brother    • Leukemia Sister       Social History:     Social History     Socioeconomic History   • Marital status: /Civil Union     Spouse name: None   • Number of children: None   • Years of education: None   • Highest education level: None   Occupational History   • None   Tobacco Use   • Smoking status: Never   • Smokeless tobacco: Never   Vaping Use   • Vaping Use: Never used   Substance and Sexual Activity   • Alcohol use: Not Currently   • Drug use: No   • Sexual activity: Yes     Partners: Female     Birth control/protection: None   Other Topics Concern   • None   Social History Narrative   • None     Social Determinants of Health     Financial Resource Strain: Not on file   Food Insecurity: Not on file   Transportation Needs: Not on file   Physical Activity: Not on file   Stress: Not on file   Social Connections: Not on file   Intimate Partner Violence: Not on file   Housing Stability: Not on file      Current Medications:     Current Outpatient Medications   Medication Sig Dispense Refill   • allopurinol (ZYLOPRIM) 300 mg tablet Take 1 tablet (300 mg total) by mouth daily 90 tablet 3   • lisinopril (ZESTRIL) 20 mg tablet Take 1 tablet (20 mg total) by mouth daily 90 tablet 3   • metoprolol succinate (TOPROL-XL) 50 mg 24 hr tablet Take 1 tablet (50 mg total) by mouth 2 (two) times a day 180 tablet 3   • rivaroxaban (Xarelto) 20 mg tablet Take 1 tablet (20 mg total) by mouth daily with breakfast 90 tablet 3     No current facility-administered medications for this visit        Allergies:     No Known Allergies   Physical Exam:     /88 (BP Location: Left arm, "Patient Position: Sitting, Cuff Size: Large)   Pulse 86   Temp 97 9 °F (36 6 °C)   Ht 5' 8\" (1 727 m)   Wt 91 2 kg (201 lb)   SpO2 95%   BMI 30 56 kg/m²     Physical Exam  Vitals and nursing note reviewed  Constitutional:       General: He is not in acute distress  Appearance: Normal appearance  He is well-developed  HENT:      Head: Normocephalic and atraumatic  Nose: Nose normal    Eyes:      Extraocular Movements: Extraocular movements intact  Conjunctiva/sclera: Conjunctivae normal       Pupils: Pupils are equal, round, and reactive to light  Cardiovascular:      Rate and Rhythm: Normal rate and regular rhythm  Heart sounds: Normal heart sounds  Pulmonary:      Effort: Pulmonary effort is normal       Breath sounds: Normal breath sounds  Abdominal:      General: Bowel sounds are normal       Palpations: Abdomen is soft  Musculoskeletal:      Cervical back: Normal range of motion  Comments: Left shoulder positive Cerna   Skin:     General: Skin is warm and dry  Neurological:      General: No focal deficit present  Mental Status: He is alert and oriented to person, place, and time     Psychiatric:         Mood and Affect: Mood normal          Speech: Speech normal          Behavior: Behavior normal           Heidy Flores MD  [address]  "

## 2023-06-09 RX ADMIN — TRIAMCINOLONE ACETONIDE 40 MG: 40 INJECTION, SUSPENSION INTRA-ARTICULAR; INTRAMUSCULAR at 15:11

## 2023-08-09 ENCOUNTER — OFFICE VISIT (OUTPATIENT)
Dept: FAMILY MEDICINE CLINIC | Facility: CLINIC | Age: 63
End: 2023-08-09
Payer: COMMERCIAL

## 2023-08-09 VITALS
BODY MASS INDEX: 28.95 KG/M2 | SYSTOLIC BLOOD PRESSURE: 108 MMHG | WEIGHT: 191 LBS | TEMPERATURE: 98 F | OXYGEN SATURATION: 98 % | HEART RATE: 92 BPM | HEIGHT: 68 IN | DIASTOLIC BLOOD PRESSURE: 90 MMHG

## 2023-08-09 DIAGNOSIS — S99.921A INJURY OF RIGHT TOE, INITIAL ENCOUNTER: ICD-10-CM

## 2023-08-09 DIAGNOSIS — R19.7 ACUTE DIARRHEA: Primary | ICD-10-CM

## 2023-08-09 DIAGNOSIS — M54.50 ACUTE BILATERAL LOW BACK PAIN WITHOUT SCIATICA: ICD-10-CM

## 2023-08-09 PROCEDURE — 99214 OFFICE O/P EST MOD 30 MIN: CPT | Performed by: FAMILY MEDICINE

## 2023-08-09 RX ORDER — CIPROFLOXACIN 500 MG/1
500 TABLET, FILM COATED ORAL EVERY 12 HOURS SCHEDULED
Qty: 14 TABLET | Refills: 0 | Status: SHIPPED | OUTPATIENT
Start: 2023-08-09 | End: 2023-08-16

## 2023-08-09 RX ORDER — CIPROFLOXACIN 500 MG/1
500 TABLET, FILM COATED ORAL EVERY 12 HOURS SCHEDULED
Qty: 14 TABLET | Refills: 0 | Status: SHIPPED | OUTPATIENT
Start: 2023-08-09 | End: 2023-08-09

## 2023-08-09 RX ORDER — METHOCARBAMOL 500 MG/1
500 TABLET, FILM COATED ORAL 4 TIMES DAILY
Qty: 20 TABLET | Refills: 0 | Status: SHIPPED | OUTPATIENT
Start: 2023-08-09

## 2023-08-09 NOTE — PROGRESS NOTES
Name: Virginia Clark      : 1960      MRN: [id number]  Encounter Provider: Sapna Llanos MD  Encounter Date: 2023   Encounter department: [address] [place] [address]     1. Acute diarrhea  -     ciprofloxacin (CIPRO) 500 mg tablet; Take 1 tablet (500 mg total) by mouth every 12 (twelve) hours for 7 days    2. Injury of right toe, initial encounter    3. Acute bilateral low back pain without sciatica  -     methocarbamol (ROBAXIN) 500 mg tablet; Take 1 tablet (500 mg total) by mouth 4 (four) times a day      Patient has a cut over his right first and second toes. Good capillary refill. Healing well. Between the toe laceration and acute diarrhea we will going to start him on Cipro 500 mg for 7 days. Follow-up if symptoms do not improve       Subjective      Patient presents with: Toe Injury: Bruised toes feet and numbness on right     Patient was in the [facility] and cut his toes on a water slide. This was last Thursday. Numbness in the right toes. Numbness since Monday. Patient also does experience diarrhea starting on . He went to urgent care and was told to start Imodium. Bowel movements have improved. He was started [place] into diet. Review of Systems   Constitutional: Negative for fatigue and fever. HENT: Negative for sore throat. Eyes: Negative for visual disturbance. Respiratory: Negative for cough, chest tightness and shortness of breath. Cardiovascular: Negative for chest pain, palpitations and leg swelling. Gastrointestinal: Positive for diarrhea. Negative for abdominal pain, constipation and nausea. Endocrine: Negative for cold intolerance and heat intolerance. Genitourinary: Negative for flank pain. Musculoskeletal: Negative for back pain and neck pain. Skin: Positive for color change and wound. Negative for rash. Neurological: Negative for headaches.    Psychiatric/Behavioral: Negative for behavioral problems and confusion. Current Outpatient Medications on File Prior to Visit   Medication Sig   • allopurinol (ZYLOPRIM) 300 mg tablet Take 1 tablet (300 mg total) by mouth daily   • lisinopril (ZESTRIL) 20 mg tablet Take 1 tablet (20 mg total) by mouth daily   • metoprolol succinate (TOPROL-XL) 50 mg 24 hr tablet Take 1 tablet (50 mg total) by mouth 2 (two) times a day   • rivaroxaban (Xarelto) 20 mg tablet Take 1 tablet (20 mg total) by mouth daily with breakfast       Objective     /90 (BP Location: Left arm, Patient Position: Sitting, Cuff Size: Large)   Pulse 92   Temp 98 °F (36.7 °C)   Ht 5' 8" (1.727 m)   Wt 86.6 kg (191 lb)   SpO2 98%   BMI 29.04 kg/m²     Physical Exam  Vitals and nursing note reviewed. Constitutional:       Appearance: He is well-developed. HENT:      Head: Normocephalic and atraumatic. Cardiovascular:      Rate and Rhythm: Normal rate and regular rhythm. Pulmonary:      Effort: Pulmonary effort is normal.      Breath sounds: Normal breath sounds. Abdominal:      General: Bowel sounds are normal.      Tenderness: There is no abdominal tenderness. Skin:     Findings: Erythema and lesion present. Neurological:      General: No focal deficit present. Mental Status: He is alert.    Psychiatric:         Mood and Affect: Mood normal.         Behavior: Behavior normal.       Anabelle Yap MD

## 2023-10-27 ENCOUNTER — CLINICAL SUPPORT (OUTPATIENT)
Dept: URGENT CARE | Facility: CLINIC | Age: 63
End: 2023-10-27

## 2023-10-27 DIAGNOSIS — Z00.00 PHYSICAL EXAM: ICD-10-CM

## 2023-10-27 LAB
ALBUMIN SERPL BCP-MCNC: 4.5 G/DL (ref 3.5–5)
ALP SERPL-CCNC: 59 U/L (ref 34–104)
ALT SERPL W P-5'-P-CCNC: 49 U/L (ref 7–52)
ANION GAP SERPL CALCULATED.3IONS-SCNC: 8 MMOL/L
AST SERPL W P-5'-P-CCNC: 40 U/L (ref 13–39)
BILIRUB SERPL-MCNC: 1.47 MG/DL (ref 0.2–1)
BUN SERPL-MCNC: 9 MG/DL (ref 5–25)
CALCIUM SERPL-MCNC: 9.3 MG/DL (ref 8.4–10.2)
CHLORIDE SERPL-SCNC: 100 MMOL/L (ref 96–108)
CHOLEST SERPL-MCNC: 184 MG/DL
CO2 SERPL-SCNC: 28 MMOL/L (ref 21–32)
CREAT SERPL-MCNC: 0.85 MG/DL (ref 0.6–1.3)
ERYTHROCYTE [DISTWIDTH] IN BLOOD BY AUTOMATED COUNT: 12.3 % (ref 11.6–15.1)
GFR SERPL CREATININE-BSD FRML MDRD: 94 ML/MIN/1.73SQ M
GLUCOSE P FAST SERPL-MCNC: 96 MG/DL (ref 65–99)
HCT VFR BLD AUTO: 54.1 % (ref 36.5–49.3)
HDLC SERPL-MCNC: 64 MG/DL
HGB BLD-MCNC: 19.1 G/DL (ref 12–17)
LDLC SERPL CALC-MCNC: 96 MG/DL (ref 0–100)
MCH RBC QN AUTO: 35.6 PG (ref 26.8–34.3)
MCHC RBC AUTO-ENTMCNC: 35.3 G/DL (ref 31.4–37.4)
MCV RBC AUTO: 101 FL (ref 82–98)
NONHDLC SERPL-MCNC: 120 MG/DL
PLATELET # BLD AUTO: 122 THOUSANDS/UL (ref 149–390)
PMV BLD AUTO: 9.6 FL (ref 8.9–12.7)
POTASSIUM SERPL-SCNC: 4.7 MMOL/L (ref 3.5–5.3)
PROT SERPL-MCNC: 7 G/DL (ref 6.4–8.4)
RBC # BLD AUTO: 5.37 MILLION/UL (ref 3.88–5.62)
SODIUM SERPL-SCNC: 136 MMOL/L (ref 135–147)
TRIGL SERPL-MCNC: 122 MG/DL
WBC # BLD AUTO: 8.36 THOUSAND/UL (ref 4.31–10.16)

## 2023-10-27 PROCEDURE — 85027 COMPLETE CBC AUTOMATED: CPT | Performed by: NURSE PRACTITIONER

## 2023-10-27 PROCEDURE — 80061 LIPID PANEL: CPT | Performed by: NURSE PRACTITIONER

## 2023-10-27 PROCEDURE — 80053 COMPREHEN METABOLIC PANEL: CPT | Performed by: NURSE PRACTITIONER

## 2023-10-27 PROCEDURE — 93005 ELECTROCARDIOGRAM TRACING: CPT | Performed by: NURSE PRACTITIONER

## 2023-10-30 LAB
ATRIAL RATE: 65 BPM
P AXIS: 1 DEGREES
PR INTERVAL: 152 MS
QRS AXIS: 55 DEGREES
QRSD INTERVAL: 82 MS
QT INTERVAL: 416 MS
QTC INTERVAL: 432 MS
T WAVE AXIS: 28 DEGREES
VENTRICULAR RATE: 65 BPM

## 2023-10-30 PROCEDURE — 93010 ELECTROCARDIOGRAM REPORT: CPT | Performed by: INTERNAL MEDICINE

## 2023-11-07 ENCOUNTER — OFFICE VISIT (OUTPATIENT)
Dept: FAMILY MEDICINE CLINIC | Facility: CLINIC | Age: 63
End: 2023-11-07
Payer: COMMERCIAL

## 2023-11-07 VITALS
HEIGHT: 68 IN | RESPIRATION RATE: 16 BRPM | SYSTOLIC BLOOD PRESSURE: 126 MMHG | HEART RATE: 72 BPM | BODY MASS INDEX: 30.16 KG/M2 | DIASTOLIC BLOOD PRESSURE: 86 MMHG | TEMPERATURE: 96.7 F | OXYGEN SATURATION: 97 % | WEIGHT: 199 LBS

## 2023-11-07 DIAGNOSIS — D58.2 ABNORMAL HEMOGLOBIN (HGB) (HCC): Primary | ICD-10-CM

## 2023-11-07 DIAGNOSIS — R71.8 ELEVATED MCV: ICD-10-CM

## 2023-11-07 DIAGNOSIS — R31.29 OTHER MICROSCOPIC HEMATURIA: ICD-10-CM

## 2023-11-07 DIAGNOSIS — M54.50 ACUTE BILATERAL LOW BACK PAIN WITHOUT SCIATICA: ICD-10-CM

## 2023-11-07 DIAGNOSIS — D69.6 THROMBOCYTOPENIA (HCC): ICD-10-CM

## 2023-11-07 PROCEDURE — 99215 OFFICE O/P EST HI 40 MIN: CPT | Performed by: FAMILY MEDICINE

## 2023-11-07 RX ORDER — METHOCARBAMOL 750 MG/1
750 TABLET, FILM COATED ORAL 4 TIMES DAILY
Qty: 30 TABLET | Refills: 1 | Status: SHIPPED | OUTPATIENT
Start: 2023-11-07

## 2023-11-07 NOTE — PROGRESS NOTES
Name: Yoly Shah      : 1960      MRN: [id number]  Encounter Provider: Brandon Martin MD  Encounter Date: 2023   Encounter department: [address] [place] [address]     1. Abnormal hemoglobin (Hgb) (HCC)  -     Peripheral Smear; Future    2. Thrombocytopenia (HCC)  -     CBC and differential; Future    3. Other microscopic hematuria  -     UA (URINE) with reflex to Scope  -     US kidney and bladder; Future; Expected date: 2023  -     Erythropoietin; Future    4. Elevated MCV  -     Vitamin B12; Future  -     Folate; Future    5. Acute bilateral low back pain without sciatica  -     methocarbamol (ROBAXIN) 750 mg tablet; Take 1 tablet (750 mg total) by mouth 4 (four) times a day      Reviewed previous blood work and notes with patient today in office. Does have an increase in hemoglobin and drop in platelets versus previous labs. Obtain lab work listed above. We will order an erythropoietin. We will hold off on JAK2 mutation at this time. If any changes in the blood work would consider revisiting hematology. For his low back pain can start methocarbamol. Continue with exercises at home. For his right-sided flank pain this could be due to his known chronic low back pain. However he has had blood in his urine in the past.  With his abnormal labs I do think it would be a good idea to obtain an ultrasound of his kidney and bladder to rule out any pathology there. He agrees with this plan. I have spent a total time of 45 minutes on 23 in caring for this patient including Diagnostic results, Prognosis, Risks and benefits of tx options, Instructions for management, Patient and family education, Importance of tx compliance, Risk factor reductions, Impressions, Counseling / Coordination of care, Documenting in the medical record, Reviewing / ordering tests, medicine, procedures  , and Obtaining or reviewing history  .          Subjective      Patient presents with: Follow-up: Blood work and back pain has benign have pain since the begin of August.    Life line screening would like to know your thoughts on it     Saw  [facility] hematologist previously 10+ years ago. He is concerned over his blood work. Review of Systems   Constitutional:  Negative for fatigue and fever. HENT:  Negative for sore throat. Eyes:  Negative for visual disturbance. Respiratory:  Negative for cough, chest tightness and shortness of breath. Cardiovascular:  Negative for chest pain, palpitations and leg swelling. Gastrointestinal:  Negative for abdominal pain, constipation, diarrhea and nausea. Endocrine: Negative for cold intolerance and heat intolerance. Genitourinary:  Positive for flank pain (Right-sided). Negative for dysuria and hematuria. Musculoskeletal:  Positive for back pain. Negative for neck pain. Skin:  Negative for rash. Neurological:  Negative for headaches. Psychiatric/Behavioral:  Negative for behavioral problems and confusion. Current Outpatient Medications on File Prior to Visit   Medication Sig   • allopurinol (ZYLOPRIM) 300 mg tablet Take 1 tablet (300 mg total) by mouth daily   • lisinopril (ZESTRIL) 20 mg tablet Take 1 tablet (20 mg total) by mouth daily   • metoprolol succinate (TOPROL-XL) 50 mg 24 hr tablet Take 1 tablet (50 mg total) by mouth 2 (two) times a day   • rivaroxaban (Xarelto) 20 mg tablet Take 1 tablet (20 mg total) by mouth daily with breakfast       Objective     /86 (BP Location: Left arm, Patient Position: Sitting, Cuff Size: Large)   Pulse 72   Temp (!) 96.7 °F (35.9 °C)   Resp 16   Ht 5' 8" (1.727 m)   Wt 90.3 kg (199 lb)   SpO2 97%   BMI 30.26 kg/m²     Physical Exam  Vitals and nursing note reviewed. Constitutional:       Appearance: He is well-developed. HENT:      Head: Normocephalic and atraumatic. Cardiovascular:      Rate and Rhythm: Normal rate and regular rhythm.    Pulmonary: Effort: Pulmonary effort is normal.      Breath sounds: Normal breath sounds. Musculoskeletal:         General: Tenderness present. Neurological:      General: No focal deficit present. Mental Status: He is alert.    Psychiatric:         Mood and Affect: Mood normal.         Behavior: Behavior normal.       Nithin Lockhart MD

## 2023-11-09 ENCOUNTER — HOSPITAL ENCOUNTER (EMERGENCY)
Facility: HOSPITAL | Age: 63
Discharge: HOME | End: 2023-11-09
Attending: EMERGENCY MEDICINE
Payer: COMMERCIAL

## 2023-11-09 ENCOUNTER — APPOINTMENT (EMERGENCY)
Dept: CT IMAGING | Facility: HOSPITAL | Age: 63
End: 2023-11-09
Payer: COMMERCIAL

## 2023-11-09 VITALS
DIASTOLIC BLOOD PRESSURE: 89 MMHG | RESPIRATION RATE: 18 BRPM | SYSTOLIC BLOOD PRESSURE: 141 MMHG | HEART RATE: 70 BPM | OXYGEN SATURATION: 99 % | TEMPERATURE: 98.4 F

## 2023-11-09 DIAGNOSIS — R19.7 DIARRHEA, UNSPECIFIED TYPE: ICD-10-CM

## 2023-11-09 DIAGNOSIS — M54.6 ACUTE LEFT-SIDED THORACIC BACK PAIN: Primary | ICD-10-CM

## 2023-11-09 DIAGNOSIS — R10.84 GENERALIZED ABDOMINAL PAIN: ICD-10-CM

## 2023-11-09 LAB
ALBUMIN SERPL BCP-MCNC: 4.4 G/DL (ref 3.5–5)
ALP SERPL-CCNC: 57 U/L (ref 34–104)
ALT SERPL W P-5'-P-CCNC: 48 U/L (ref 7–52)
ANION GAP SERPL CALCULATED.3IONS-SCNC: 6 MMOL/L
AST SERPL W P-5'-P-CCNC: 40 U/L (ref 13–39)
BASOPHILS # BLD AUTO: 0.08 THOUSANDS/ÂΜL (ref 0–0.1)
BASOPHILS NFR BLD AUTO: 1 % (ref 0–1)
BILIRUB SERPL-MCNC: 1.16 MG/DL (ref 0.2–1)
BUN SERPL-MCNC: 8 MG/DL (ref 5–25)
CALCIUM SERPL-MCNC: 9.3 MG/DL (ref 8.4–10.2)
CHLORIDE SERPL-SCNC: 102 MMOL/L (ref 96–108)
CO2 SERPL-SCNC: 26 MMOL/L (ref 21–32)
CREAT SERPL-MCNC: 0.85 MG/DL (ref 0.6–1.3)
EOSINOPHIL # BLD AUTO: 0.55 THOUSAND/ÂΜL (ref 0–0.61)
EOSINOPHIL NFR BLD AUTO: 6 % (ref 0–6)
ERYTHROCYTE [DISTWIDTH] IN BLOOD BY AUTOMATED COUNT: 13 % (ref 11.6–15.1)
GFR SERPL CREATININE-BSD FRML MDRD: 94 ML/MIN/1.73SQ M
GLUCOSE SERPL-MCNC: 97 MG/DL (ref 65–140)
HCT VFR BLD AUTO: 54.1 % (ref 36.5–49.3)
HGB BLD-MCNC: 18.8 G/DL (ref 12–17)
HOLD SPECIMEN: NORMAL
IMM GRANULOCYTES # BLD AUTO: 0.06 THOUSAND/UL (ref 0–0.2)
IMM GRANULOCYTES NFR BLD AUTO: 1 % (ref 0–2)
LIPASE SERPL-CCNC: 42 U/L (ref 11–82)
LYMPHOCYTES # BLD AUTO: 2.54 THOUSANDS/ÂΜL (ref 0.6–4.47)
LYMPHOCYTES NFR BLD AUTO: 27 % (ref 14–44)
MCH RBC QN AUTO: 35.1 PG (ref 26.8–34.3)
MCHC RBC AUTO-ENTMCNC: 34.8 G/DL (ref 31.4–37.4)
MCV RBC AUTO: 101 FL (ref 82–98)
MONOCYTES # BLD AUTO: 0.69 THOUSAND/ÂΜL (ref 0.17–1.22)
MONOCYTES NFR BLD AUTO: 7 % (ref 4–12)
NEUTROPHILS # BLD AUTO: 5.56 THOUSANDS/ÂΜL (ref 1.85–7.62)
NEUTS SEG NFR BLD AUTO: 58 % (ref 43–75)
NRBC BLD AUTO-RTO: 0 /100 WBCS
PLATELET # BLD AUTO: 153 THOUSANDS/UL (ref 149–390)
PMV BLD AUTO: 9.9 FL (ref 8.9–12.7)
POTASSIUM SERPL-SCNC: 4.5 MMOL/L (ref 3.5–5.3)
PROT SERPL-MCNC: 6.9 G/DL (ref 6.4–8.4)
RBC # BLD AUTO: 5.35 MILLION/UL (ref 3.88–5.62)
SODIUM SERPL-SCNC: 134 MMOL/L (ref 135–147)
WBC # BLD AUTO: 9.48 THOUSAND/UL (ref 4.31–10.16)

## 2023-11-09 PROCEDURE — 99284 EMERGENCY DEPT VISIT MOD MDM: CPT

## 2023-11-09 PROCEDURE — 96366 THER/PROPH/DIAG IV INF ADDON: CPT

## 2023-11-09 PROCEDURE — 36415 COLL VENOUS BLD VENIPUNCTURE: CPT

## 2023-11-09 PROCEDURE — 83690 ASSAY OF LIPASE: CPT | Performed by: EMERGENCY MEDICINE

## 2023-11-09 PROCEDURE — G1004 CDSM NDSC: HCPCS

## 2023-11-09 PROCEDURE — 74177 CT ABD & PELVIS W/CONTRAST: CPT

## 2023-11-09 PROCEDURE — 85025 COMPLETE CBC W/AUTO DIFF WBC: CPT | Performed by: EMERGENCY MEDICINE

## 2023-11-09 PROCEDURE — 99285 EMERGENCY DEPT VISIT HI MDM: CPT | Performed by: EMERGENCY MEDICINE

## 2023-11-09 PROCEDURE — 80053 COMPREHEN METABOLIC PANEL: CPT | Performed by: EMERGENCY MEDICINE

## 2023-11-09 PROCEDURE — 96365 THER/PROPH/DIAG IV INF INIT: CPT

## 2023-11-09 RX ORDER — SODIUM CHLORIDE, SODIUM GLUCONATE, SODIUM ACETATE, POTASSIUM CHLORIDE, MAGNESIUM CHLORIDE, SODIUM PHOSPHATE, DIBASIC, AND POTASSIUM PHOSPHATE .53; .5; .37; .037; .03; .012; .00082 G/100ML; G/100ML; G/100ML; G/100ML; G/100ML; G/100ML; G/100ML
1000 INJECTION, SOLUTION INTRAVENOUS ONCE
Status: COMPLETED | OUTPATIENT
Start: 2023-11-09 | End: 2023-11-09

## 2023-11-09 RX ADMIN — IOHEXOL 100 ML: 350 INJECTION, SOLUTION INTRAVENOUS at 09:10

## 2023-11-09 RX ADMIN — SODIUM CHLORIDE, SODIUM GLUCONATE, SODIUM ACETATE, POTASSIUM CHLORIDE, MAGNESIUM CHLORIDE, SODIUM PHOSPHATE, DIBASIC, AND POTASSIUM PHOSPHATE 1000 ML: .53; .5; .37; .037; .03; .012; .00082 INJECTION, SOLUTION INTRAVENOUS at 07:48

## 2023-11-09 NOTE — ED PROVIDER NOTES
History  Chief Complaint   Patient presents with    Back Pain     Pt states he has had worsening right flank pain radiating to lower back. Denies urinating issues. Pt states he has had diarrhea since Sunday. Denies N/V      Patient is a 59-year-old male who presents to the emergency room with back pain in the right thoracic region that has been ongoing for quite some times. Patient also notes that he has had  diarrhea since Sunday. His back pain combined with his nausea prompted him to come for evaluation to the emergency room. Patient states that he has had back pain ever since he went on a trip to the [facility] [place] in August 4789. He noticed his right-sided back pain after a round of golf. Patient also had a bout of gastroenteritis in which she was treated for patient states he has never felt the same since this. Patient back pain is worse with movement and stabbing in nature. Patient states that he feels as though it takes his breath away at times. The pain, waxes and wanes. Patient has tried taking Tylenol and a muscle relaxer with minimal improvement. Patient has not tried any ice or heat. Patient denies any fevers, chills, dysuria, hematuria or belly pain. Patient denies any chest pain or shortness of breath. Of note patient does take Eliquis 20 mg daily for history of factor V and clotting disorder. Back Pain  Associated symptoms: no abdominal pain, no chest pain, no dysuria, no headaches, no numbness and no weakness        Prior to Admission Medications   Prescriptions Last Dose Informant Patient Reported?  Taking?   allopurinol (ZYLOPRIM) 300 mg tablet  Self No No   Sig: Take 1 tablet (300 mg total) by mouth daily   lisinopril (ZESTRIL) 20 mg tablet  Self No No   Sig: Take 1 tablet (20 mg total) by mouth daily   methocarbamol (ROBAXIN) 750 mg tablet   No No   Sig: Take 1 tablet (750 mg total) by mouth 4 (four) times a day   metoprolol succinate (TOPROL-XL) 50 mg 24 hr tablet  Self No No   Sig: Take 1 tablet (50 mg total) by mouth 2 (two) times a day   rivaroxaban (Xarelto) 20 mg tablet  Self No No   Sig: Take 1 tablet (20 mg total) by mouth daily with breakfast      Facility-Administered Medications: None       Past Medical History:   Diagnosis Date    Abnormal liver function test     Arthritis 2018    Cancer [facility]) 2007    Diverticulitis of colon 2015    DVT (deep venous thrombosis) ([address])     left leg  2012 also has Factor V    Gouty arthritis     Hypertension 1990    Polyp of sigmoid colon        Past Surgical History:   Procedure Laterality Date    COLONOSCOPY      UMBILICAL HERNIA REPAIR      UPPER GASTROINTESTINAL ENDOSCOPY      WISDOM TOOTH EXTRACTION         Family History   Problem Relation Age of Onset    Breast cancer Mother     Hypertension Mother     Lung cancer Mother     Cancer Mother     Coronary artery disease Father     Hypertension Father     Heart disease Father     Hypertension Brother     Leukemia Brother     Leukemia Sister     Hypertension Sister     Cancer Sister      I have reviewed and agree with the history as documented. E-Cigarette/Vaping    E-Cigarette Use Never User      E-Cigarette/Vaping Substances    Nicotine No     THC No     CBD No     Flavoring No     Other No     Unknown No      Social History     Tobacco Use    Smoking status: Never    Smokeless tobacco: Never   Vaping Use    Vaping Use: Never used   Substance Use Topics    Alcohol use: Yes     Alcohol/week: 2.0 standard drinks of alcohol     Types: 2 Cans of beer per week    Drug use: No        Review of Systems   Constitutional:  Negative for chills and fatigue. Respiratory:  Negative for chest tightness and shortness of breath. Cardiovascular:  Negative for chest pain, palpitations and leg swelling. Gastrointestinal:  Positive for diarrhea. Negative for abdominal distention, abdominal pain, constipation, nausea and vomiting. Genitourinary:  Positive for flank pain.  Negative for difficulty urinating, dysuria, enuresis and hematuria. Musculoskeletal:  Positive for back pain. Skin:  Negative for rash. Neurological:  Negative for dizziness, weakness, numbness and headaches. Physical Exam  ED Triage Vitals   Temperature Pulse Respirations Blood Pressure SpO2   11/09/23 0658 11/09/23 0658 11/09/23 0658 11/09/23 0700 11/09/23 0658   98.4 °F (36.9 °C) 84 18 (!) 132/101 99 %      Temp Source Heart Rate Source Patient Position - Orthostatic VS BP Location FiO2 (%)   11/09/23 0658 11/09/23 0658 11/09/23 0658 11/09/23 0915 --   Oral Monitor Sitting Right arm       Pain Score       11/09/23 0658       9             Orthostatic Vital Signs  Vitals:    11/09/23 0658 11/09/23 0700 11/09/23 0915   BP:  (!) 132/101 141/89   Pulse: 84  70   Patient Position - Orthostatic VS: Sitting  Sitting       Physical Exam  Constitutional:       Appearance: Normal appearance. He is normal weight. HENT:      Head: Normocephalic and atraumatic. Nose: No congestion or rhinorrhea. Cardiovascular:      Rate and Rhythm: Normal rate and regular rhythm. Heart sounds: No murmur heard. Pulmonary:      Effort: Pulmonary effort is normal. No respiratory distress. Breath sounds: Normal breath sounds. No stridor. No wheezing or rales. Abdominal:      General: Bowel sounds are normal. There is no distension. Palpations: Abdomen is soft. Tenderness: There is no abdominal tenderness. There is no guarding. Musculoskeletal:         General: Normal range of motion. Right lower leg: No edema. Left lower leg: No edema. Comments: Patient reports tenderness to palpation of right and left thoracic musculature in T9-T12 region. . No rashes or signs of trauma present. Patient reports pain with sitting upright. Pain is worse with extension. Straight leg test negative. No step offs or masses noted   Neurological:      General: No focal deficit present.       Mental Status: He is alert and oriented to person, place, and time. Mental status is at baseline. ED Medications  Medications   multi-electrolyte (ISOLYTE-S PH 7.4) bolus 1,000 mL (0 mL Intravenous Stopped 11/9/23 1111)   iohexol (OMNIPAQUE) 350 MG/ML injection (SINGLE-DOSE) 100 mL (100 mL Intravenous Given 11/9/23 0910)       Diagnostic Studies  Results Reviewed       Procedure Component Value Units Date/Time    CBC and differential [[id number]]  (Abnormal) Collected: 11/09/23 0707    Lab Status: Final result Specimen: Blood from Arm, Left Updated: 11/09/23 0906     WBC 9.48 Thousand/uL      RBC 5.35 Million/uL      Hemoglobin 18.8 g/dL      Hematocrit 54.1 %       fL      MCH 35.1 pg      MCHC 34.8 g/dL      RDW 13.0 %      MPV 9.9 fL      Platelets 765 Thousands/uL      nRBC 0 /100 WBCs      Neutrophils Relative 58 %      Immat GRANS % 1 %      Lymphocytes Relative 27 %      Monocytes Relative 7 %      Eosinophils Relative 6 %      Basophils Relative 1 %      Neutrophils Absolute 5.56 Thousands/µL      Immature Grans Absolute 0.06 Thousand/uL      Lymphocytes Absolute 2.54 Thousands/µL      Monocytes Absolute 0.69 Thousand/µL      Eosinophils Absolute 0.55 Thousand/µL      Basophils Absolute 0.08 Thousands/µL     [place] draw [[id number]] Collected: 11/09/23 0707    Lab Status: Final result Specimen: Blood from Arm, Left Updated: 11/09/23 0901    Narrative: The following orders were created for panel order [place] draw. Procedure                               Abnormality         Status                     ---------                               -----------         ------                     Harshal Mohs / Yellow tube on POJW[[id number]]                      Final result               Green / Black tube on DTCC[[id number]]                       Final result               Lavender Top 3 ml on MWSS[[id number]]                        Final result                 Please view results for these tests on the individual orders.     Comprehensive metabolic panel [[id number]]  (Abnormal) Collected: 11/09/23 0707    Lab Status: Final result Specimen: Blood from Arm, Left Updated: 11/09/23 0800     Sodium 134 mmol/L      Potassium 4.5 mmol/L      Chloride 102 mmol/L      CO2 26 mmol/L      ANION GAP 6 mmol/L      BUN 8 mg/dL      Creatinine 0.85 mg/dL      Glucose 97 mg/dL      Calcium 9.3 mg/dL      AST 40 U/L      ALT 48 U/L      Alkaline Phosphatase 57 U/L      Total Protein 6.9 g/dL      Albumin 4.4 g/dL      Total Bilirubin 1.16 mg/dL      eGFR 94 ml/min/1.73sq m     Narrative:      [place] guidelines for Chronic Kidney Disease (CKD):     Stage 1 with normal or high GFR (GFR > 90 mL/min/1.73 square meters)    Stage 2 Mild CKD (GFR = 60-89 mL/min/1.73 square meters)    Stage 3A Moderate CKD (GFR = 45-59 mL/min/1.73 square meters)    Stage 3B Moderate CKD (GFR = 30-44 mL/min/1.73 square meters)    Stage 4 Severe CKD (GFR = 15-29 mL/min/1.73 square meters)    Stage 5 End Stage CKD (GFR <15 mL/min/1.73 square meters)  Note: GFR calculation is accurate only with a steady state creatinine    Lipase [[id number]]  (Normal) Collected: 11/09/23 0707    Lab Status: Final result Specimen: Blood from Arm, Left Updated: 11/09/23 0800     Lipase 42 u/L                    CT abdomen pelvis with contrast   Final Result by Izabella Gandhi MD (11/09 1004)      Nonobstructive bowel compatible with diarrheal illness. Colonic diverticulosis. Hepatic steatosis. Additional chronic findings and negatives as above. Workstation performed: [id number]               Procedures  Procedures      ED Course                                       Medical Decision Making  Patient is a 24-year-old male presents to the emergency room with left-sided flank pain and diarrhea. Patient has had back pain ongoing for many weeks. Pain seems musculoskeletal in nature with no bony trauma or abnormalities noted on CT scan.   Patient encouraged to continue with Robaxin at home for pain management. To follow-up with primary care doctor again for evaluation of pain. Patient also noted to have acute diarrheal illness. Diarrhea is nonbloody and has been ongoing for 5 days. Patient was encouraged to keep up with oral intake and to become reevaluate if symptoms continue for more than 7 to 10 days and or diarrhea becomes bloody. Patient is agreeable to this plan will follow up with PCP on an outpatient basis. Amount and/or Complexity of Data Reviewed  Labs: ordered. Details: Patients CBC showed chronic elvated HB of 18.8 patient sees hematology outpatient. CMP mild  elevated ast & bilirubin  Radiology: ordered. Details: Impression: Nonobstructive bowel compatible with diarrheal illness. Colonic diverticulosis. Hepatic steatosis. Additional chronic findings and negatives as above    Risk  Prescription drug management. Disposition  Final diagnoses:   Acute left-sided thoracic back pain   Generalized abdominal pain   Diarrhea, unspecified type     Time reflects when diagnosis was documented in both MDM as applicable and the Disposition within this note       Time User Action Codes Description Comment    11/9/2023 10:20 AM Hallie Villa Add [M54.6] Acute left-sided thoracic back pain     11/9/2023 10:20 AM Jun Lycoming J Add [R10.84] Generalized abdominal pain     11/9/2023 10:20 AM Hallie Villa Add [R19.7] Diarrhea, unspecified type           ED Disposition       ED Disposition   Discharge    Condition   Stable    Date/Time   Th Nov 9, 2023 10:20 AM    Comment   Jomar Townsend discharge to home/self care.                    Follow-up Information       Follow up With Specialties Details Why Contact Info Additional Information    [facility] Physical Therapy Call today To arrange for the next available appointment             Discharge Medication List as of 11/9/2023 10:20 AM CONTINUE these medications which have NOT CHANGED    Details   allopurinol (ZYLOPRIM) 300 mg tablet Take 1 tablet (300 mg total) by mouth daily, Starting Mon 2/27/2023, Normal      lisinopril (ZESTRIL) 20 mg tablet Take 1 tablet (20 mg total) by mouth daily, Starting Mon 2/27/2023, Normal      methocarbamol (ROBAXIN) 750 mg tablet Take 1 tablet (750 mg total) by mouth 4 (four) times a day, Starting Tue 11/7/2023, Normal      metoprolol succinate (TOPROL-XL) 50 mg 24 hr tablet Take 1 tablet (50 mg total) by mouth 2 (two) times a day, Starting Mon 2/27/2023, Normal      rivaroxaban (Xarelto) 20 mg tablet Take 1 tablet (20 mg total) by mouth daily with breakfast, Starting Mon 2/27/2023, Normal               PDMP Review       None             ED Provider  Attending physically available and evaluated Venu Ashraf. I managed the patient along with the ED Attending.     Electronically Signed by    Paul Rhodes DO  PGY-1            Paul Rhodes DO  11/09/23 6165

## 2023-11-09 NOTE — ED PROVIDER NOTES
History  Chief Complaint   Patient presents with    Back Pain     Pt states he has had worsening right flank pain radiating to lower back. Denies urinating issues. Pt states he has had diarrhea since Sunday. Denies N/V          65-year-old male, presents with left-sided back/rib pain. ,  Patient states 2 weeks ago he was in the [facility], swinging a golf club, felt pain in his side much worse the next morning when he woke up. Has been having pain since with movement tried some muscle relaxers with helped slightly. Today was much worse prompting ED visit.,  Patient states is much worse with any movement better when holding still, no nausea no vomiting patient has had diarrhea since returning from the [facility] as well, currently on antibiotics prescribed by PCP for this.,  No falls no injury no trauma, no melena no hematochezia. Prior to Admission Medications   Prescriptions Last Dose Informant Patient Reported?  Taking?   allopurinol (ZYLOPRIM) 300 mg tablet  Self No No   Sig: Take 1 tablet (300 mg total) by mouth daily   lisinopril (ZESTRIL) 20 mg tablet  Self No No   Sig: Take 1 tablet (20 mg total) by mouth daily   methocarbamol (ROBAXIN) 750 mg tablet   No No   Sig: Take 1 tablet (750 mg total) by mouth 4 (four) times a day   metoprolol succinate (TOPROL-XL) 50 mg 24 hr tablet  Self No No   Sig: Take 1 tablet (50 mg total) by mouth 2 (two) times a day   rivaroxaban (Xarelto) 20 mg tablet  Self No No   Sig: Take 1 tablet (20 mg total) by mouth daily with breakfast      Facility-Administered Medications: None       Past Medical History:   Diagnosis Date    Abnormal liver function test     Arthritis 2018    Cancer [facility]) 2007    Diverticulitis of colon 2015    DVT (deep venous thrombosis) ([address])     left leg  2012 also has Factor V    Gouty arthritis     Hypertension 1990    Polyp of sigmoid colon        Past Surgical History:   Procedure Laterality Date    COLONOSCOPY      UMBILICAL HERNIA REPAIR      UPPER GASTROINTESTINAL ENDOSCOPY      WISDOM TOOTH EXTRACTION         Family History   Problem Relation Age of Onset    Breast cancer Mother     Hypertension Mother     Lung cancer Mother     Cancer Mother     Coronary artery disease Father     Hypertension Father     Heart disease Father     Hypertension Brother     Leukemia Brother     Leukemia Sister     Hypertension Sister     Cancer Sister      I have reviewed and agree with the history as documented. E-Cigarette/Vaping    E-Cigarette Use Never User      E-Cigarette/Vaping Substances    Nicotine No     THC No     CBD No     Flavoring No     Other No     Unknown No      Social History     Tobacco Use    Smoking status: Never    Smokeless tobacco: Never   Vaping Use    Vaping Use: Never used   Substance Use Topics    Alcohol use: Yes     Alcohol/week: 2.0 standard drinks of alcohol     Types: 2 Cans of beer per week    Drug use: No       Review of Systems   Constitutional:  Negative for activity change, chills, diaphoresis and fever. HENT:  Negative for congestion, sinus pressure and sore throat. Eyes:  Negative for pain and visual disturbance. Respiratory:  Negative for cough, chest tightness, shortness of breath, wheezing and stridor. Cardiovascular:  Negative for chest pain and palpitations. Gastrointestinal:  Negative for abdominal distention, abdominal pain, constipation, diarrhea, nausea and vomiting. Genitourinary:  Negative for dysuria and frequency. Musculoskeletal:  Positive for back pain. Negative for neck pain and neck stiffness. Skin:  Negative for rash. Neurological:  Negative for dizziness, speech difficulty, light-headedness, numbness and headaches. Physical Exam  Physical Exam  Vitals reviewed. Constitutional:       General: He is not in acute distress. Appearance: He is well-developed. He is not diaphoretic. HENT:      Head: Normocephalic and atraumatic.       Right Ear: External ear normal. Left Ear: External ear normal.      Nose: Nose normal.   Eyes:      General:         Right eye: No discharge. Left eye: No discharge. Pupils: Pupils are equal, round, and reactive to light. Neck:      Trachea: No tracheal deviation. Cardiovascular:      Rate and Rhythm: Normal rate and regular rhythm. Heart sounds: Normal heart sounds. No murmur heard. Pulmonary:      Effort: Pulmonary effort is normal. No respiratory distress. Breath sounds: Normal breath sounds. No stridor. Abdominal:      General: There is no distension. Palpations: Abdomen is soft. Tenderness: There is no abdominal tenderness. There is no guarding or rebound. Musculoskeletal:         General: Normal range of motion. Cervical back: Normal range of motion and neck supple. Skin:     General: Skin is warm and dry. Coloration: Skin is not pale. Findings: No erythema. Neurological:      General: No focal deficit present. Mental Status: He is alert and oriented to person, place, and time.          Vital Signs  ED Triage Vitals   Temperature Pulse Respirations Blood Pressure SpO2   11/09/23 0658 11/09/23 0658 11/09/23 0658 11/09/23 0700 11/09/23 0658   98.4 °F (36.9 °C) 84 18 (!) 132/101 99 %      Temp Source Heart Rate Source Patient Position - Orthostatic VS BP Location FiO2 (%)   11/09/23 0658 11/09/23 0658 11/09/23 0658 11/09/23 0915 --   Oral Monitor Sitting Right arm       Pain Score       11/09/23 0658       9           Vitals:    11/09/23 0658 11/09/23 0700 11/09/23 0915   BP:  (!) 132/101 141/89   Pulse: 84  70   Patient Position - Orthostatic VS: Sitting  Sitting         Visual Acuity      ED Medications  Medications   multi-electrolyte (ISOLYTE-S PH 7.4) bolus 1,000 mL (0 mL Intravenous Stopped 11/9/23 1111)   iohexol (OMNIPAQUE) 350 MG/ML injection (SINGLE-DOSE) 100 mL (100 mL Intravenous Given 11/9/23 0910)       Diagnostic Studies  Results Reviewed       Procedure Component Value Units Date/Time    CBC and differential [[id number]]  (Abnormal) Collected: 11/09/23 0707    Lab Status: Final result Specimen: Blood from Arm, Left Updated: 11/09/23 0906     WBC 9.48 Thousand/uL      RBC 5.35 Million/uL      Hemoglobin 18.8 g/dL      Hematocrit 54.1 %       fL      MCH 35.1 pg      MCHC 34.8 g/dL      RDW 13.0 %      MPV 9.9 fL      Platelets 774 Thousands/uL      nRBC 0 /100 WBCs      Neutrophils Relative 58 %      Immat GRANS % 1 %      Lymphocytes Relative 27 %      Monocytes Relative 7 %      Eosinophils Relative 6 %      Basophils Relative 1 %      Neutrophils Absolute 5.56 Thousands/µL      Immature Grans Absolute 0.06 Thousand/uL      Lymphocytes Absolute 2.54 Thousands/µL      Monocytes Absolute 0.69 Thousand/µL      Eosinophils Absolute 0.55 Thousand/µL      Basophils Absolute 0.08 Thousands/µL     [place] draw [[id number]] Collected: 11/09/23 0707    Lab Status: Final result Specimen: Blood from Arm, Left Updated: 11/09/23 0901    Narrative: The following orders were created for panel order [place] draw. Procedure                               Abnormality         Status                     ---------                               -----------         ------                     Higinio Rosamaria / Yellow tube on YBLX[[id number]]                      Final result               Green / Black tube on IEZF[[id number]]                       Final result               Lavender Top 3 ml on TVBB[[id number]]                        Final result                 Please view results for these tests on the individual orders.     Comprehensive metabolic panel [[id number]]  (Abnormal) Collected: 11/09/23 0707    Lab Status: Final result Specimen: Blood from Arm, Left Updated: 11/09/23 0800     Sodium 134 mmol/L      Potassium 4.5 mmol/L      Chloride 102 mmol/L      CO2 26 mmol/L      ANION GAP 6 mmol/L      BUN 8 mg/dL      Creatinine 0.85 mg/dL      Glucose 97 mg/dL      Calcium 9.3 mg/dL      AST 40 U/L ALT 48 U/L      Alkaline Phosphatase 57 U/L      Total Protein 6.9 g/dL      Albumin 4.4 g/dL      Total Bilirubin 1.16 mg/dL      eGFR 94 ml/min/1.73sq m     Narrative:      [place] guidelines for Chronic Kidney Disease (CKD):     Stage 1 with normal or high GFR (GFR > 90 mL/min/1.73 square meters)    Stage 2 Mild CKD (GFR = 60-89 mL/min/1.73 square meters)    Stage 3A Moderate CKD (GFR = 45-59 mL/min/1.73 square meters)    Stage 3B Moderate CKD (GFR = 30-44 mL/min/1.73 square meters)    Stage 4 Severe CKD (GFR = 15-29 mL/min/1.73 square meters)    Stage 5 End Stage CKD (GFR <15 mL/min/1.73 square meters)  Note: GFR calculation is accurate only with a steady state creatinine    Lipase [[id number]]  (Normal) Collected: 11/09/23 0707    Lab Status: Final result Specimen: Blood from Arm, Left Updated: 11/09/23 0800     Lipase 42 u/L                    CT abdomen pelvis with contrast   Final Result by Malathi Alarcon MD (11/09 1004)      Nonobstructive bowel compatible with diarrheal illness. Colonic diverticulosis. Hepatic steatosis. Additional chronic findings and negatives as above. Workstation performed: [id number]                    Procedures  Procedures         ED Course                                             Medical Decision Making        Initial ED assessment: 68-year-old male, anticoagulated, presenting with severe back pain.,  It is reproducible with motion. Initial DDx includes but is not limited to:   Musculoskeletal strain most likely, but due to being on anticoagulation and not improving, concern for retroperitoneal hematoma.   As per diarrhea, likely traveler's diarrhea, less  colitis    Initial ED plan:   Blood work, CT scan, ultimate disposition pending ED work-up        Final ED summary/disposition:   After evaluation and workup in the emergency department, work-up thankfully unremarkable patient discharged will follow with outpatient comprehensive spine program    Amount and/or Complexity of Data Reviewed  Labs: ordered. Radiology: ordered. Risk  Prescription drug management. Disposition  Final diagnoses:   Acute left-sided thoracic back pain   Generalized abdominal pain   Diarrhea, unspecified type     Time reflects when diagnosis was documented in both MDM as applicable and the Disposition within this note       Time User Action Codes Description Comment    11/9/2023 10:20 AM Karon Cortez Add [M54.6] Acute left-sided thoracic back pain     11/9/2023 10:20 AM Johnmark Lidia DICKSON Add [R10.84] Generalized abdominal pain     11/9/2023 10:20 AM Karon Cortez Add [R19.7] Diarrhea, unspecified type           ED Disposition       ED Disposition   Discharge    Condition   Stable    Date/Time   Thu Nov 9, 2023 10:20 AM    Comment   Vee Hanks discharge to home/self care.                    Follow-up Information       Follow up With Specialties Details Why Contact Info Additional Information    [facility] Physical Therapy Call today To arrange for the next available appointment             Discharge Medication List as of 11/9/2023 10:20 AM        CONTINUE these medications which have NOT CHANGED    Details   allopurinol (ZYLOPRIM) 300 mg tablet Take 1 tablet (300 mg total) by mouth daily, Starting Mon 2/27/2023, Normal      lisinopril (ZESTRIL) 20 mg tablet Take 1 tablet (20 mg total) by mouth daily, Starting Mon 2/27/2023, Normal      methocarbamol (ROBAXIN) 750 mg tablet Take 1 tablet (750 mg total) by mouth 4 (four) times a day, Starting Tue 11/7/2023, Normal      metoprolol succinate (TOPROL-XL) 50 mg 24 hr tablet Take 1 tablet (50 mg total) by mouth 2 (two) times a day, Starting Mon 2/27/2023, Normal      rivaroxaban (Xarelto) 20 mg tablet Take 1 tablet (20 mg total) by mouth daily with breakfast, Starting Mon 2/27/2023, Normal                 PDMP Review       None ED Provider  Electronically Signed by             Samaria Patterson DO  11/09/23 0442

## 2023-11-10 ENCOUNTER — NURSE TRIAGE (OUTPATIENT)
Dept: PHYSICAL THERAPY | Facility: OTHER | Age: 63
End: 2023-11-10

## 2023-11-10 ENCOUNTER — TELEPHONE (OUTPATIENT)
Age: 63
End: 2023-11-10

## 2023-11-10 DIAGNOSIS — M54.50 ACUTE RIGHT-SIDED LOW BACK PAIN WITHOUT SCIATICA: Primary | ICD-10-CM

## 2023-11-10 NOTE — TELEPHONE ENCOUNTER
Additional Information   Negative: Is this related to a work injury? Negative: Is this related to an MVA? Negative: Are you currently recieving homecare services? Background - Initial Assessment  Clinical complaint: Pain right mid to low back, no radiation into legs, no numbness or tingling. Started Aug 2023 while on vacation. Has been on and off since. Became constant in Oct 2023. NKI Pt thinks possibly from playing golf? Was seen by PCP and recently by ED 11/9/23. Pain was constant and sharp prior to ED visit. Since then with muscle relaxer's pain is intermittent and a dull ache.    Date of onset: Aug 2023  Frequency of pain: intermittent  Quality of pain: aching, dull, and sharp    Protocols used: [facility] Protocol

## 2023-11-10 NOTE — TELEPHONE ENCOUNTER
Additional Information   Negative: Has the patient had unexplained weight loss? Negative: Does the patient have a fever? Negative: Is the patient experiencing urine retention? Negative: Is the patient experiencing acute drop foot or paralysis? Negative: Has the patient experienced major trauma? (fall from height, high speed collision, direct blow to spine) and is also experiencing nausea, light-headedness, or loss of consciousness? Negative: Is the patient experiencing blood in sputum? Negative: Is this a chronic condition? Protocols used: [address] [place] [address] Protocol    This RN did review in detail the [facility] and what we can provide for their back pain. Patient is agreeable to being triaged by this RN and would like to proceed with Physical Therapy. Referral was placed for Physical Therapy at the [address] [place] [address] site. Patients information was sent to the  to make evaluation appointment. Patient made aware that the PT office  will be calling to schedule the appointment. Patient was provided with the phone number to the PT office. No further questions and/or concerns were voiced by the patient at this time. Patient states understanding of the referral that was placed. Referral Closed.

## 2023-11-10 NOTE — TELEPHONE ENCOUNTER
Patient called was seen in the ER yesterday for back pain. Patient stated he had a CT of the abdomen and pelvis and wanted to know does he still need to have the US of the kidney and bladder. Please advise. Offered patient appt but he declined.

## 2023-11-12 NOTE — PROGRESS NOTES
PT Evaluation     Today's date: 2023  Patient name: Kervin Doss  : 1960  MRN: [id number]  Referring provider: Xochilt Berumen PT  Dx:   Encounter Diagnosis     ICD-10-CM    1. Acute right-sided low back pain without sciatica  M54.50 Ambulatory referral to PT spine                       Assessment  Assessment details: Kervin Doss is a 66 y.o. male who presents with signs and symptoms consistent of acute right sided thoracolumbar back pain related to mobility deficits. Pt has been experiencing pain since August but had significant exacerbation of pain last week. No inciting event or mechanism of injury. Patient presents with right thoracolumbar pain, decreased lumbar spine ROM,  decreased thoracic spine ROM, joint mobility restrictions, poor motor control, and poor posture. Due to these impairments, Patient has difficulty performing ambulating, prolonged sitting, lifting, bed mobility, and transferring. Patient would benefit from skilled physical therapy to address the impairments, improve their level of function, and to improve their overall quality of life. Primary movement impairments:   1) Decreased L/S ROM  2) Significant joint mobility deficits in thoracolumbar spine  3) Poor motor control of the core     Impairments: abnormal muscle firing, abnormal muscle tone, abnormal or restricted ROM, activity intolerance, impaired physical strength, lacks appropriate home exercise program, pain with function, weight-bearing intolerance, poor posture  and poor body mechanics  Understanding of Dx/Px/POC: good   Prognosis: good    Goals  STG: To be achieved in 4 -6 weeks  1)Improve lumbar spine ROM by 25%   2)Reduce pain by 50%  3)Improve flexibility to mild restrictions  4)Improve strength in lower extremity by 1/2 MMT  5)Improve joint mobility in thoracolumbar region to WNL.      LTG: To be achieved at Discharge  1)Patient is independent with HEP  2)Return to pre-morbid work related activities  3)Improve tolerance to prolonged sitting, standing, and walking to prior level of function   4)Improve FOTO to greater than or equal to predicted score. Plan  Patient would benefit from: PT eval and skilled physical therapy  Planned modality interventions: low level laser therapy  Planned therapy interventions: joint mobilization, manual therapy, neuromuscular re-education, patient education, therapeutic activities, therapeutic exercise and home exercise program  Frequency: 2x per week for 4-6 weeks. Treatment plan discussed with: patient        Subjective Evaluation    History of Present Illness  Mechanism of injury: History of Current Injury: Pt referred to the comprehensive spine program with a chief complaint of subacute right sided thoracolumbar spine pain and flank pain. Pt's pain started in August while on vacation in the [place] after playing golf and felt a pulled muscle in his flank. Pt's symptoms were waxing and waning in September. Symptoms became constant in October and progressively worsening in severity and intensity. As symptoms started to worsen, Pt went to the ED last week, 11/9. Pt was also experiencing consitutional symptoms such as diarrhea. Pt had a CT of the abdomen with results below (no significant osseous pathology). Pt denies any radiating symptoms or numbness/tingling in the LE's. Pt has been taking muscle relaxants (last dose on Saturday). Pt is much improved from last week. Pt denies any muscle spasms since last week. Patient reports occasional lower back pain when not working his core. Pain location/Descriptors: Mainly right sided lower back pain and flank which can drift to the left side. Aggravating factors: golf, rotation, walking, stairs,   Easing factors: Meds, Heat. 24 HR pattern: Seems to be correlated with bowel movements. Imaging: IMPRESSION:  Nonobstructive bowel compatible with diarrheal illness. Colonic diverticulosis. Hepatic steatosis.   Degenerative changes to the spine. Special Questions: Denies and radiation of symptoms, paresthesia, B&B concerns, denies recent fever/infection, weight changes, or blood in stool/urine. Patient goals: Hobbies/Interest: Enjoys travel, plays golf regularly. Occupation: . Quality of life: good    Patient Goals  Patient goals for therapy: decreased pain, increased strength, increased motion and independence with ADLs/IADLs    Pain  Current pain ratin  At worst pain rating: 10      Diagnostic Tests  CT scan: normal  Treatments  Current treatment: medication        Objective     Palpation     Right   Hypertonic in the erector spinae and lumbar paraspinals. Tenderness of the erector spinae and lumbar paraspinals. Neurological Testing     Sensation     Lumbar   Left   Intact: light touch    Right   Intact: light touch    Reflexes   Left   Patellar (L4): trace (1+)  Achilles (S1): trace (1+)  Clonus sign: negative    Right   Patellar (L4): trace (1+)  Achilles (S1): trace (1+)  Clonus sign: negative    Active Range of Motion     Lumbar   Flexion: 90 degrees   Extension: 30 (tightness) degrees   Left lateral flexion: 35 (tightness) degrees     Right lateral flexion: 40 (tightness) degrees   Left rotation: Active left lumbar rotation: tightness. Restriction level: moderate  Right rotation: Active right lumbar rotation: tightness. Restriction level: moderate    Additional Active Range of Motion Details  Pain free flexion.   Tightness in all other planes of motion toward end range    Strength/Myotome Testing     Lumbar   Left   Heel walk: normal  Toe walk: normal    Right   Heel walk: normal  Toe walk: normal    Left Hip   Planes of Motion   Flexion: 5  Abduction: 5  Adduction: 5    Right Hip   Planes of Motion   Flexion: 5  Abduction: 5  Adduction: 5    Left Knee   Flexion: 5  Extension: 5    Right Knee   Flexion: 5  Extension: 5    Left Ankle/Foot   Dorsiflexion: 5  Plantar flexion: 5  Great toe extension: 5    Right Ankle/Foot   Dorsiflexion: 5  Plantar flexion: 5  Great toe extension: 5    Tests     Lumbar     Left   Positive passive SLR. Negative crossed SLR. Right   Negative crossed SLR and passive SLR. Left Hip   Positive FADIR. Negative ABRAHAN. Right Hip   Positive FADIR. Negative ABRAHAN. Additional Tests Details  FADIR Positive for groin discomfort    Lumbar spine joint mobility  CPA: hypomobile throughout    Thoracic spine joint mobility:  CPA: hypomobile throughout  R UPA: hypomobile and painful T5-L1    Bed mobility: difficult and painful   Transfers: mild difficulty due to pain    Positive neural tension in LLE              Precautions: GERD, HTN, DVT, Anticoagulation precautions      Manuals 11/14            CPA of thoraco/lumbar spine nv            Neutral gap Gr II+III                                      Neuro Re-Ed             Bridge 10x            TrA March             TrA Clamshells             L sciaitic nerve glide             Quad alt UE/LE lifts             Side bridge                          Ther Ex             Prone press up 10x            LTR 10x            Hip IR mobilization                                                                              Ther Activity             Plank             Anti-rotation press             Chop at [employer]                                       Modalities                                        Pt was given a HEP with verbal and written instruction x 10 minutes. [email or web address]  Access Code: [email or web address]

## 2023-11-14 ENCOUNTER — EVALUATION (OUTPATIENT)
Dept: PHYSICAL THERAPY | Facility: CLINIC | Age: 63
End: 2023-11-14
Payer: COMMERCIAL

## 2023-11-14 DIAGNOSIS — M54.50 ACUTE RIGHT-SIDED LOW BACK PAIN WITHOUT SCIATICA: ICD-10-CM

## 2023-11-14 PROCEDURE — 97110 THERAPEUTIC EXERCISES: CPT | Performed by: PHYSICAL THERAPIST

## 2023-11-14 PROCEDURE — 97162 PT EVAL MOD COMPLEX 30 MIN: CPT | Performed by: PHYSICAL THERAPIST

## 2023-11-16 ENCOUNTER — OFFICE VISIT (OUTPATIENT)
Dept: PHYSICAL THERAPY | Facility: CLINIC | Age: 63
End: 2023-11-16
Payer: COMMERCIAL

## 2023-11-16 DIAGNOSIS — M54.50 ACUTE RIGHT-SIDED LOW BACK PAIN WITHOUT SCIATICA: Primary | ICD-10-CM

## 2023-11-16 PROCEDURE — 97140 MANUAL THERAPY 1/> REGIONS: CPT | Performed by: PHYSICAL THERAPIST

## 2023-11-16 PROCEDURE — 97112 NEUROMUSCULAR REEDUCATION: CPT | Performed by: PHYSICAL THERAPIST

## 2023-11-16 PROCEDURE — 97110 THERAPEUTIC EXERCISES: CPT | Performed by: PHYSICAL THERAPIST

## 2023-11-16 NOTE — PROGRESS NOTES
Daily Note     Today's date: 2023  Patient name: Kervin Doss  : 1960  MRN: [id number]  Referring provider: Xochilt Berumen, PT  Dx:   Encounter Diagnosis     ICD-10-CM    1. Acute right-sided low back pain without sciatica  M54.50                      Subjective: Pt reports having some soreness in lower back and tightness with HEP. He was not able to complete all prescribed sets and reps secondary to discomfort. Objective: See treatment diary below      Assessment: Initiated treatment based upon primary movement impairments identified at IE. Tolerated treatment fair. Considerable joint mobility restrictions present in thoracic spine with increased tone of paraspinals. Finished session with  in prone. Patient exhibited good technique with therapeutic exercises and would benefit from continued PT      Plan: Continue per plan of care.       Precautions: GERD, HTN, DVT, Anticoagulation precautions      Manuals            CPA of thoraco/lumbar spine nv Gr II + III           Neutral gap Gr II+III            R costotransverse mobilization                          Neuro Re-Ed             Bridge 10x 2x10           TrA March  10x on ea           TrA Clamshells             Sciaitic nerve glide  15x5" B           Quad alt UE/LE lifts             Side bridge             TrA hold  10x 3"           Ther Ex             Prone press up 10x            LTR 10x 10x 3" ea           Hip IR mobilization             Bike  8'                                                               Ther Activity             Plank             Anti-rotation press             Chop at [employer]                                       Modalities                10'                        1:1 with PT from [id number]

## 2023-11-21 ENCOUNTER — OFFICE VISIT (OUTPATIENT)
Dept: PHYSICAL THERAPY | Facility: CLINIC | Age: 63
End: 2023-11-21
Payer: COMMERCIAL

## 2023-11-21 DIAGNOSIS — M54.50 ACUTE RIGHT-SIDED LOW BACK PAIN WITHOUT SCIATICA: Primary | ICD-10-CM

## 2023-11-21 PROCEDURE — 97112 NEUROMUSCULAR REEDUCATION: CPT | Performed by: PHYSICAL THERAPIST

## 2023-11-21 PROCEDURE — 97140 MANUAL THERAPY 1/> REGIONS: CPT | Performed by: PHYSICAL THERAPIST

## 2023-11-21 PROCEDURE — 97110 THERAPEUTIC EXERCISES: CPT | Performed by: PHYSICAL THERAPIST

## 2023-11-21 NOTE — PROGRESS NOTES
Daily Note     Today's date: 2023  Patient name: Yoly Shah  : 1960  MRN: [id number]  Referring provider: Diamante Goldberg, PT  Dx:   Encounter Diagnosis     ICD-10-CM    1. Acute right-sided low back pain without sciatica  M54.50                      Subjective: Pt reports feeling really sore after weekend activities but did fine after last PT session. He states that he was cleaning out his pellet stove. Objective: See treatment diary below      Assessment: Thoracolumbar spine continues to demonstrate joint hypomobility and discomfort with end range motion. Most discomfort was in left side of the lower lumbar spine according to patient. Positive effects with manual therapy. Tolerated treatment well focusing on spinal mobility and graded stabilization. Patient would benefit from continued PT      Plan: Continue per plan of care.       Precautions: GERD, HTN, DVT, Anticoagulation precautions      Manuals           CPA of thoraco/lumbar spine nv Gr II + III Gr II + III          Neutral gap Gr II+III            R costotransverse mobilization                          Neuro Re-Ed             Bridge 10x 2x10 2x10          TrA March  10x on ea C/ LE lowering 10x on ea           [facility]             Sciaitic nerve glide  15x5" B Hamstring stretch 5x10"           Quad alt UE/LE lifts             Side bridge             TrA hold  10x 3"           Ther Ex             Prone press up 10x  10x          LTR 10x 10x 3" ea 10x 3" ea          Hip IR mobilization             Bike  8' 9' L4          Quad thoracic rotation   10x B          Thoracic extension over bolster   3x30"                                    Ther Activity             Plank             Anti-rotation press             Chop at [facility] btb           Gait Training                                       Modalities                10'                        1:1 with PT from 445-530pm

## 2023-11-28 ENCOUNTER — OFFICE VISIT (OUTPATIENT)
Dept: PHYSICAL THERAPY | Facility: CLINIC | Age: 63
End: 2023-11-28
Payer: COMMERCIAL

## 2023-11-28 DIAGNOSIS — M54.50 ACUTE RIGHT-SIDED LOW BACK PAIN WITHOUT SCIATICA: Primary | ICD-10-CM

## 2023-11-28 PROCEDURE — 97112 NEUROMUSCULAR REEDUCATION: CPT | Performed by: PHYSICAL THERAPIST

## 2023-11-28 PROCEDURE — 97140 MANUAL THERAPY 1/> REGIONS: CPT | Performed by: PHYSICAL THERAPIST

## 2023-11-28 NOTE — PROGRESS NOTES
Daily Note     Today's date: 2023  Patient name: Alley Allan  : 1960  MRN: [id number]  Referring provider: Juan Randall, PT  Dx:   Encounter Diagnosis     ICD-10-CM    1. Acute right-sided low back pain without sciatica  M54.50           Start Time: 1555  Stop Time: 1640  Total time in clinic (min): 45 minutes    Subjective: Pt reports improvements in thoracolumbar spine pain but complaining of shoulder pain after sleeping. Objective: See treatment diary below      Assessment: Tolerated treatment well. Joint mobility progressively improving. Progress core stabilization program today with good tolerance. Patient demonstrated fatigue post treatment, exhibited good technique with therapeutic exercises, and would benefit from continued PT. Plan: Continue per plan of care.       Precautions: GERD, HTN, DVT, Anticoagulation precautions      Manuals          CPA of thoraco/lumbar spine nv Gr II + III Gr II + III Gr III         Neutral gap Gr II+III            R costotransverse mobilization                          Neuro Re-Ed             Bridge 10x 2x10 2x10 2x10         TrA March  10x on ea C/ LE lowering 10x on ea  Bilateral LE in and outs 10x           [facility]             Sciaitic nerve glide  15x5" B Hamstring stretch 5x10"  Hamstring stretch 5x10"          Quad alt UE/LE lifts             Side bridge             TrA hold  10x 3"           Ther Ex             Prone press up 10x  10x 2x10         LTR 10x 10x 3" ea 10x 3" ea 20x 3"         Hip IR mobilization             Bike  8' 9' L4 8' L5         Quad thoracic rotation   10x B 10x B         Thoracic extension over bolster   3x30" 3x30"                                   Ther Activity             Plank             Anti-rotation press    10x5" B 10# [place]          Chop at [employer]   10x btb  [place]eredith 8# 10x ea         Gait Training                                       Modalities                10' 1:1 with PT from 355-420pm

## 2023-11-30 ENCOUNTER — TELEPHONE (OUTPATIENT)
Dept: PHYSICAL THERAPY | Facility: OTHER | Age: 63
End: 2023-11-30

## 2023-11-30 ENCOUNTER — OFFICE VISIT (OUTPATIENT)
Dept: PHYSICAL THERAPY | Facility: CLINIC | Age: 63
End: 2023-11-30
Payer: COMMERCIAL

## 2023-11-30 DIAGNOSIS — M54.50 ACUTE RIGHT-SIDED LOW BACK PAIN WITHOUT SCIATICA: Primary | ICD-10-CM

## 2023-11-30 PROCEDURE — 97140 MANUAL THERAPY 1/> REGIONS: CPT | Performed by: PHYSICAL THERAPIST

## 2023-11-30 PROCEDURE — 97530 THERAPEUTIC ACTIVITIES: CPT | Performed by: PHYSICAL THERAPIST

## 2023-11-30 PROCEDURE — 97110 THERAPEUTIC EXERCISES: CPT | Performed by: PHYSICAL THERAPIST

## 2023-11-30 NOTE — TELEPHONE ENCOUNTER
Patient was triaged by [facility] on 11/10. PT eval on 11/14.  Patient has more appts scheduled to continue with Tx.

## 2023-11-30 NOTE — PROGRESS NOTES
Daily Note     Today's date: 2023  Patient name: Belkys Keyes  : 1960  MRN: [id number]  Referring provider: Donna Wyman, PT  Dx:   Encounter Diagnosis     ICD-10-CM    1. Acute right-sided low back pain without sciatica  M54.50           Start Time: 1450  Stop Time: 1543  Total time in clinic (min): 53 minutes    Subjective: Pt reports having some left sided discomfort in the lower back along with anterior shoulder pain. Objective: See treatment diary below      Assessment: Tolerated treatment well. Progressed program without increased discomfort. Modified program to decrease WB activities for the shoulder. Patient exhibited good technique with therapeutic exercises and would benefit from continued PT. Plan: Continue per plan of care.       Precautions: GERD, HTN, DVT, Anticoagulation precautions      Manuals         CPA of thoraco/lumbar spine nv Gr II + III Gr II + III Gr III Gr III        Neutral gap Gr II+III            R costotransverse mobilization                          Neuro Re-Ed             Bridge 10x 2x10 2x10 2x10 C/ Marching         TrA March  10x on ea C/ LE lowering 10x on ea  Bilateral LE in and outs 10x   Bilateral LE in and outs 10x          TrA [employer]             Sciaitic nerve glide  15x5" B Hamstring stretch 5x10"  Hamstring stretch 5x10"          Quad alt UE/LE lifts             Side bridge             TrA hold  10x 3"           Ther Ex             Prone press up 10x  10x 2x10 Deferred d/t shoulder pain        LTR 10x 10x 3" ea 10x 3" ea 20x 3" With PB 20x         Hip IR mobilization             Bike  8' 9' L4 8' L5 9' L5        Quad thoracic rotation   10x B 10x B         Thoracic extension over bolster   3x30" 3x30"         Standing extension over plinth     2x10        Sidelying thoracolumbar rotation     10x5" bilaterally         Ther Activity             Plank             Anti-rotation press    10x5" B 10# [place]  Stir the pot 10x on ea cw/ccw        Chop at [place]   10x btb  Meredith 8# 10x [place] 8# 10x lele        Gait Training                                       Modalities                10'                        1:1 with PT from 250-330pm

## 2024-01-17 DIAGNOSIS — M1A.0710 CHRONIC IDIOPATHIC GOUT INVOLVING TOE OF RIGHT FOOT WITHOUT TOPHUS: ICD-10-CM

## 2024-01-17 DIAGNOSIS — I10 PRIMARY HYPERTENSION: ICD-10-CM

## 2024-01-17 DIAGNOSIS — D68.51 FACTOR V LEIDEN MUTATION (HCC): ICD-10-CM

## 2024-01-18 RX ORDER — LISINOPRIL 20 MG/1
20 TABLET ORAL DAILY
Qty: 90 TABLET | Refills: 1 | Status: SHIPPED | OUTPATIENT
Start: 2024-01-18

## 2024-01-18 RX ORDER — RIVAROXABAN 20 MG/1
20 TABLET, FILM COATED ORAL
Qty: 90 TABLET | Refills: 1 | Status: SHIPPED | OUTPATIENT
Start: 2024-01-18

## 2024-01-18 RX ORDER — ALLOPURINOL 300 MG/1
300 TABLET ORAL DAILY
Qty: 90 TABLET | Refills: 1 | Status: SHIPPED | OUTPATIENT
Start: 2024-01-18

## 2024-01-18 RX ORDER — METOPROLOL SUCCINATE 50 MG/1
50 TABLET, EXTENDED RELEASE ORAL 2 TIMES DAILY
Qty: 180 TABLET | Refills: 1 | Status: SHIPPED | OUTPATIENT
Start: 2024-01-18

## 2024-01-22 ENCOUNTER — OFFICE VISIT (OUTPATIENT)
Dept: FAMILY MEDICINE CLINIC | Facility: CLINIC | Age: 64
End: 2024-01-22
Payer: COMMERCIAL

## 2024-01-22 VITALS
BODY MASS INDEX: 31.83 KG/M2 | OXYGEN SATURATION: 97 % | HEART RATE: 100 BPM | DIASTOLIC BLOOD PRESSURE: 80 MMHG | SYSTOLIC BLOOD PRESSURE: 136 MMHG | WEIGHT: 210 LBS | TEMPERATURE: 98.1 F | RESPIRATION RATE: 18 BRPM | HEIGHT: 68 IN

## 2024-01-22 DIAGNOSIS — J06.9 UPPER RESPIRATORY TRACT INFECTION, UNSPECIFIED TYPE: Primary | ICD-10-CM

## 2024-01-22 LAB
SL AMB POCT RAPID FLU A: NORMAL
SL AMB POCT RAPID FLU B: NORMAL
SL AMB POCT RAPID RSV: NORMAL

## 2024-01-22 PROCEDURE — 99213 OFFICE O/P EST LOW 20 MIN: CPT | Performed by: STUDENT IN AN ORGANIZED HEALTH CARE EDUCATION/TRAINING PROGRAM

## 2024-01-22 PROCEDURE — 87804 INFLUENZA ASSAY W/OPTIC: CPT | Performed by: STUDENT IN AN ORGANIZED HEALTH CARE EDUCATION/TRAINING PROGRAM

## 2024-01-22 PROCEDURE — 87807 RSV ASSAY W/OPTIC: CPT | Performed by: STUDENT IN AN ORGANIZED HEALTH CARE EDUCATION/TRAINING PROGRAM

## 2024-01-22 RX ORDER — AZITHROMYCIN 250 MG/1
TABLET, FILM COATED ORAL DAILY
Qty: 6 TABLET | Refills: 0 | Status: SHIPPED | OUTPATIENT
Start: 2024-01-22 | End: 2024-01-27

## 2024-01-22 RX ORDER — FLUTICASONE PROPIONATE 50 MCG
1 SPRAY, SUSPENSION (ML) NASAL DAILY
Qty: 15.8 ML | Refills: 1 | Status: SHIPPED | OUTPATIENT
Start: 2024-01-22 | End: 2024-01-22

## 2024-01-22 RX ORDER — BROMPHENIRAMINE MALEATE, PSEUDOEPHEDRINE HYDROCHLORIDE, AND DEXTROMETHORPHAN HYDROBROMIDE 2; 30; 10 MG/5ML; MG/5ML; MG/5ML
5 SYRUP ORAL 4 TIMES DAILY PRN
Qty: 120 ML | Refills: 0 | Status: SHIPPED | OUTPATIENT
Start: 2024-01-22

## 2024-01-22 RX ORDER — FLUTICASONE PROPIONATE 50 MCG
1 SPRAY, SUSPENSION (ML) NASAL DAILY
Qty: 15.8 ML | Refills: 1 | Status: SHIPPED | OUTPATIENT
Start: 2024-01-22

## 2024-01-22 RX ORDER — CETIRIZINE HYDROCHLORIDE 10 MG/1
10 TABLET ORAL DAILY
Qty: 14 TABLET | Refills: 0 | Status: SHIPPED | OUTPATIENT
Start: 2024-01-22

## 2024-01-22 RX ORDER — AZITHROMYCIN 250 MG/1
TABLET, FILM COATED ORAL DAILY
Qty: 6 TABLET | Refills: 0 | Status: SHIPPED | OUTPATIENT
Start: 2024-01-22 | End: 2024-01-22

## 2024-01-22 RX ORDER — BROMPHENIRAMINE MALEATE, PSEUDOEPHEDRINE HYDROCHLORIDE, AND DEXTROMETHORPHAN HYDROBROMIDE 2; 30; 10 MG/5ML; MG/5ML; MG/5ML
5 SYRUP ORAL 4 TIMES DAILY PRN
Qty: 120 ML | Refills: 0 | Status: SHIPPED | OUTPATIENT
Start: 2024-01-22 | End: 2024-01-22

## 2024-01-22 RX ORDER — CETIRIZINE HYDROCHLORIDE 10 MG/1
10 TABLET ORAL DAILY
Qty: 14 TABLET | Refills: 0 | Status: SHIPPED | OUTPATIENT
Start: 2024-01-22 | End: 2024-01-22

## 2024-01-22 NOTE — PROGRESS NOTES
Name: Juan Luis Muñoz      : 1960      MRN: [id number]  Encounter Provider: Sharon Alfaro MD  Encounter Date: 2024   Encounter department: [facility]    Assessment & Plan     1. Upper respiratory tract infection, unspecified type  Comments:  Discussed conservative management. Will provide pocket script for azithromycin in the event symptoms persist beyond 10-14 days.  Orders:  -     POCT rapid flu A and B  -     POCT rapid RSV  -     fluticasone (FLONASE) 50 mcg/act nasal spray; 1 spray into each nostril daily  -     azithromycin (Zithromax) 250 mg tablet; Take 2 tablets (500 mg total) by mouth daily for 1 day, THEN 1 tablet (250 mg total) daily for 4 days.  -     brompheniramine-pseudoephedrine-DM 30-2-10 MG/5ML syrup; Take 5 mL by mouth 4 (four) times a day as needed for allergies  -     cetirizine (ZyrTEC) 10 mg tablet; Take 1 tablet (10 mg total) by mouth daily    Discussed saline rinse, gargle with salt water, adequate nutrition.        Subjective     Cough  Associated symptoms include rhinorrhea, a sore throat and shortness of breath (with exertion). Pertinent negatives include no chest pain, chills, fever, myalgias or wheezing.     This is a very pleasant 63 y.o. male who presents to the clinic for URI symptoms.   Symptom onset 5 days ago. Symptoms include sinus pressure, rhinorrhea, chest congestion, and productive cough of yellow-brown tinged sputum.   Denies sick contacts. Took home covid test, which was negative.   Reports subjective fever and chills.   Review of Systems   Constitutional:  Negative for chills and fever.   HENT:  Positive for rhinorrhea, sinus pressure and sore throat. Negative for congestion.    Respiratory:  Positive for cough and shortness of breath (with exertion). Negative for chest tightness and wheezing.    Cardiovascular:  Negative for chest pain and palpitations.   Gastrointestinal:  Negative for abdominal pain, nausea and vomiting.   Endocrine:  Negative for polyuria.   Genitourinary:  Negative for difficulty urinating, dysuria, frequency and urgency.   Musculoskeletal:  Negative for myalgias.   Neurological:  Negative for dizziness, syncope and light-headedness.   Psychiatric/Behavioral:  Negative for dysphoric mood.        Past Medical History:   Diagnosis Date    Abnormal liver function test     Arthritis 2018    Cancer (HCC) 2007    Diverticulitis of colon 2015    DVT (deep venous thrombosis) (HCC)     left leg  2012 also has Factor V    Gouty arthritis     Hypertension 1990    Polyp of sigmoid colon      Past Surgical History:   Procedure Laterality Date    COLONOSCOPY      UMBILICAL HERNIA REPAIR      UPPER GASTROINTESTINAL ENDOSCOPY      WISDOM TOOTH EXTRACTION       Family History   Problem Relation Age of Onset    Breast cancer Mother     Hypertension Mother     Lung cancer Mother     Cancer Mother     Coronary artery disease Father     Hypertension Father     Heart disease Father     Hypertension Brother     Leukemia Brother     Leukemia Sister     Hypertension Sister     Cancer Sister      Social History     Socioeconomic History    Marital status: /Civil Union     Spouse name: None    Number of children: None    Years of education: None    Highest education level: None   Occupational History    None   Tobacco Use    Smoking status: Never    Smokeless tobacco: Never   Vaping Use    Vaping status: Never Used   Substance and Sexual Activity    Alcohol use: Yes     Alcohol/week: 2.0 standard drinks of alcohol     Types: 2 Cans of beer per week    Drug use: No    Sexual activity: Yes     Partners: Female     Birth control/protection: None   Other Topics Concern    None   Social History Narrative    None     Social Determinants of Health     Financial Resource Strain: Not on file   Food Insecurity: Not on file   Transportation Needs: Not on file   Physical Activity: Not on file   Stress: Not on file   Social Connections: Not on file   Intimate  "Partner Violence: Not on file   Housing Stability: Not on file     Current Outpatient Medications on File Prior to Visit   Medication Sig    allopurinol (ZYLOPRIM) 300 mg tablet TAKE 1 TABLET BY MOUTH DAILY    lisinopril (ZESTRIL) 20 mg tablet TAKE 1 TABLET BY MOUTH DAILY    metoprolol succinate (TOPROL-XL) 50 mg 24 hr tablet TAKE 1 TABLET BY MOUTH TWICE  DAILY    rivaroxaban (Xarelto) 20 mg tablet TAKE 1 TABLET BY MOUTH DAILY  WITH BREAKFAST    methocarbamol (ROBAXIN) 750 mg tablet Take 1 tablet (750 mg total) by mouth 4 (four) times a day (Patient not taking: Reported on 1/22/2024)     No Known Allergies  Immunization History   Administered Date(s) Administered    COVID-19 MODERNA VACC 0.5 ML IM 03/20/2021, 04/17/2021, 11/26/2021    COVID-19 Pfizer Vac BIVALENT Nicholas-sucrose 12 Yr+ IM 10/15/2022    INFLUENZA 11/25/2014, 09/14/2016, 01/18/2018, 11/16/2018, 11/22/2019, 10/09/2020, 10/30/2021, 10/15/2022, 10/12/2023, 11/01/2023    Influenza Quadrivalent Preservative Free 3 years and older IM 01/18/2018    Influenza Quadrivalent, 6-35 Months IM 11/25/2014, 09/14/2016    Influenza, recombinant, quadrivalent,injectable, preservative free 11/16/2018, 11/22/2019, 10/09/2020    Influenza, seasonal, injectable 01/04/2011    Td (adult), adsorbed 01/04/2011    Tdap 06/21/2020    Zoster Vaccine Recombinant 11/21/2022, 03/22/2023       Objective     /80 (BP Location: Left arm, Patient Position: Sitting, Cuff Size: Large)   Pulse 100   Temp 98.1 °F (36.7 °C)   Resp 18   Ht 5' 8\" (1.727 m)   Wt 95.3 kg (210 lb)   SpO2 97%   BMI 31.93 kg/m²     Physical Exam  Constitutional:       Appearance: Normal appearance.   HENT:      Head: Normocephalic and atraumatic.   Eyes:      Conjunctiva/sclera: Conjunctivae normal.   Cardiovascular:      Rate and Rhythm: Normal rate and regular rhythm.      Heart sounds: Normal heart sounds.   Pulmonary:      Effort: Pulmonary effort is normal.      Breath sounds: Normal breath sounds. "   Abdominal:      General: There is no distension.   Musculoskeletal:         General: Normal range of motion.      Cervical back: Normal range of motion and neck supple.   Neurological:      Mental Status: He is alert and oriented to person, place, and time.   Psychiatric:         Behavior: Behavior normal.         Thought Content: Thought content normal.       Sharon Alfaro MD

## 2024-02-01 DIAGNOSIS — D68.51 FACTOR V LEIDEN MUTATION (HCC): ICD-10-CM

## 2024-02-01 DIAGNOSIS — M1A.0710 CHRONIC IDIOPATHIC GOUT INVOLVING TOE OF RIGHT FOOT WITHOUT TOPHUS: ICD-10-CM

## 2024-02-01 DIAGNOSIS — I10 PRIMARY HYPERTENSION: ICD-10-CM

## 2024-02-01 NOTE — TELEPHONE ENCOUNTER
Reason for call:   [x] Refill   [] Prior Auth  [] Other:     Office:   [x] PCP/Provider - Joshua Melendez MD   [] Specialty/Provider -     Medication: allopurinol 300 mg / 1 tablet daily (90 tabs)                      lisinopril 20 mg / 1 tablet daily (90 tabs)                      metoprolol  50 mg / 1 tablet Bid (180 tabs)                      rivaroxaban 20 mg / 1 tablet daily (90 tabs)      [facility]    Does the patient have enough for 3 days?   [x] Yes   [] No - Send as HP to POD

## 2024-02-02 RX ORDER — ALLOPURINOL 300 MG/1
300 TABLET ORAL DAILY
Qty: 90 TABLET | Refills: 1 | Status: SHIPPED | OUTPATIENT
Start: 2024-02-02

## 2024-02-02 RX ORDER — LISINOPRIL 20 MG/1
20 TABLET ORAL DAILY
Qty: 90 TABLET | Refills: 1 | Status: SHIPPED | OUTPATIENT
Start: 2024-02-02

## 2024-02-02 RX ORDER — METOPROLOL SUCCINATE 50 MG/1
50 TABLET, EXTENDED RELEASE ORAL 2 TIMES DAILY
Qty: 180 TABLET | Refills: 1 | Status: SHIPPED | OUTPATIENT
Start: 2024-02-02

## 2024-06-17 ENCOUNTER — OFFICE VISIT (OUTPATIENT)
Dept: FAMILY MEDICINE CLINIC | Facility: CLINIC | Age: 64
End: 2024-06-17
Payer: COMMERCIAL

## 2024-06-17 VITALS
BODY MASS INDEX: 30.77 KG/M2 | HEART RATE: 75 BPM | DIASTOLIC BLOOD PRESSURE: 86 MMHG | OXYGEN SATURATION: 96 % | RESPIRATION RATE: 16 BRPM | HEIGHT: 68 IN | WEIGHT: 203 LBS | TEMPERATURE: 97.7 F | SYSTOLIC BLOOD PRESSURE: 132 MMHG

## 2024-06-17 DIAGNOSIS — E78.1 HYPERTRIGLYCERIDEMIA: ICD-10-CM

## 2024-06-17 DIAGNOSIS — Z00.00 ANNUAL PHYSICAL EXAM: Primary | ICD-10-CM

## 2024-06-17 DIAGNOSIS — M1A.0710 CHRONIC IDIOPATHIC GOUT INVOLVING TOE OF RIGHT FOOT WITHOUT TOPHUS: ICD-10-CM

## 2024-06-17 DIAGNOSIS — D68.51 FACTOR V LEIDEN MUTATION (HCC): ICD-10-CM

## 2024-06-17 DIAGNOSIS — Z12.5 SCREENING PSA (PROSTATE SPECIFIC ANTIGEN): ICD-10-CM

## 2024-06-17 DIAGNOSIS — I10 PRIMARY HYPERTENSION: ICD-10-CM

## 2024-06-17 DIAGNOSIS — M20.41 HAMMER TOE OF RIGHT FOOT: ICD-10-CM

## 2024-06-17 DIAGNOSIS — R71.8 ELEVATED MCV: ICD-10-CM

## 2024-06-17 PROCEDURE — 99396 PREV VISIT EST AGE 40-64: CPT | Performed by: FAMILY MEDICINE

## 2024-06-17 PROCEDURE — 99213 OFFICE O/P EST LOW 20 MIN: CPT | Performed by: FAMILY MEDICINE

## 2024-06-17 RX ORDER — LISINOPRIL 20 MG/1
20 TABLET ORAL DAILY
Qty: 90 TABLET | Refills: 1 | Status: SHIPPED | OUTPATIENT
Start: 2024-06-17

## 2024-06-17 RX ORDER — METOPROLOL SUCCINATE 50 MG/1
50 TABLET, EXTENDED RELEASE ORAL 2 TIMES DAILY
Qty: 180 TABLET | Refills: 1 | Status: SHIPPED | OUTPATIENT
Start: 2024-06-17

## 2024-06-17 RX ORDER — ALLOPURINOL 300 MG/1
300 TABLET ORAL DAILY
Qty: 90 TABLET | Refills: 1 | Status: SHIPPED | OUTPATIENT
Start: 2024-06-17 | End: 2024-06-17

## 2024-06-17 RX ORDER — ALLOPURINOL 300 MG/1
300 TABLET ORAL DAILY
Qty: 90 TABLET | Refills: 1 | Status: SHIPPED | OUTPATIENT
Start: 2024-06-17

## 2024-06-17 RX ORDER — METOPROLOL SUCCINATE 50 MG/1
50 TABLET, EXTENDED RELEASE ORAL 2 TIMES DAILY
Qty: 180 TABLET | Refills: 1 | Status: SHIPPED | OUTPATIENT
Start: 2024-06-17 | End: 2024-06-17

## 2024-06-17 RX ORDER — LISINOPRIL 20 MG/1
20 TABLET ORAL DAILY
Qty: 90 TABLET | Refills: 1 | Status: SHIPPED | OUTPATIENT
Start: 2024-06-17 | End: 2024-06-17

## 2024-06-17 NOTE — PROGRESS NOTES
Adult Annual Physical  Name: Juan Luis Muñoz      : 1960      MRN: [id number]  Encounter Provider: Joshua Melendez MD  Encounter Date: 2024   Encounter department: [facility]    Assessment & Plan   1. Annual physical exam  2. Factor V Leiden mutation (HCC)  -     rivaroxaban (Xarelto) 20 mg tablet; Take 1 tablet (20 mg total) by mouth daily with breakfast  3. Primary hypertension  -     CBC and Platelet; Future  -     Comprehensive metabolic panel; Future  -     lisinopril (ZESTRIL) 20 mg tablet; Take 1 tablet (20 mg total) by mouth daily  -     metoprolol succinate (TOPROL-XL) 50 mg 24 hr tablet; Take 1 tablet (50 mg total) by mouth 2 (two) times a day  4. Elevated MCV  -     Vitamin B12; Future  5. Screening PSA (prostate specific antigen)  -     PSA, Total Screen; Future  6. Hammer toe of right foot  7. Hypertriglyceridemia  -     Lipid panel; Future  8. Chronic idiopathic gout involving toe of right foot without tophus  -     allopurinol (ZYLOPRIM) 300 mg tablet; Take 1 tablet (300 mg total) by mouth daily  Annual and Problem visit completed today   Medications refilled today.  Numbness in the right toe likely secondary to hammertoe.  Recommend meeting with podiatry.  Obtain blood work listed above    Immunizations and preventive care screenings were discussed with patient today. Appropriate education was printed on patient's after visit summary.    Discussed risks and benefits of prostate cancer screening. We discussed the controversial history of PSA screening for prostate cancer in the [place] as well as the risk of over detection and over treatment of prostate cancer by way of PSA screening.  The patient understands that PSA blood testing is an imperfect way to screen for prostate cancer and that elevated PSA levels in the blood may also be caused by infection, inflammation, prostatic trauma or manipulation, urological procedures, or by benign prostatic  enlargement.    The role of the digital rectal examination in prostate cancer screening was also discussed and I discussed with him that there is large interobserver variability in the findings of digital rectal examination.    Counseling:  Alcohol/drug use: discussed moderation in alcohol intake, the recommendations for healthy alcohol use, and avoidance of illicit drug use.  Dental Health: discussed importance of regular tooth brushing, flossing, and dental visits.  Injury prevention: discussed safety/seat belts, safety helmets, smoke detectors, carbon dioxide detectors, and smoking near bedding or upholstery.  Sexual health: discussed sexually transmitted diseases, partner selection, use of condoms, avoidance of unintended pregnancy, and contraceptive alternatives.  Exercise: the importance of regular exercise/physical activity was discussed. Recommend exercise 3-5 times per week for at least 30 minutes.          History of Present Illness     Adult Annual Physical:  Patient presents for annual physical. Numbness in middle right toe.  Previously had burning in the toe.  However has not have this for a year..     Diet and Physical Activity:  - Diet/Nutrition: well balanced diet.  - Exercise: walking.    General Health:  - Sleep: sleeps well.  - Hearing: normal hearing bilateral ears.  - Vision: vision problems.  - Dental: regular dental visits.    Review of Systems   Constitutional:  Negative for activity change, fatigue and fever.   Eyes:  Negative for visual disturbance.   Respiratory:  Negative for shortness of breath.    Cardiovascular:  Negative for chest pain.   Gastrointestinal:  Negative for abdominal pain, constipation, diarrhea and nausea.   Endocrine: Negative for cold intolerance and heat intolerance.   Musculoskeletal:  Negative for back pain.   Skin:  Negative for rash.   Neurological:  Negative for headaches.   Psychiatric/Behavioral:  Negative for confusion.      Medical History Reviewed by  "provider this encounter:  Tobacco  Allergies  Meds  Problems  Med Hx  Surg Hx  Fam Hx       Current Outpatient Medications on File Prior to Visit   Medication Sig Dispense Refill    brompheniramine-pseudoephedrine-DM 30-2-10 MG/5ML syrup Take 5 mL by mouth 4 (four) times a day as needed for allergies 120 mL 0    cetirizine (ZyrTEC) 10 mg tablet Take 1 tablet (10 mg total) by mouth daily 14 tablet 0    fluticasone (FLONASE) 50 mcg/act nasal spray 1 spray into each nostril daily 15.8 mL 1    [DISCONTINUED] allopurinol (ZYLOPRIM) 300 mg tablet Take 1 tablet (300 mg total) by mouth daily 90 tablet 1    [DISCONTINUED] lisinopril (ZESTRIL) 20 mg tablet Take 1 tablet (20 mg total) by mouth daily 90 tablet 1    [DISCONTINUED] metoprolol succinate (TOPROL-XL) 50 mg 24 hr tablet Take 1 tablet (50 mg total) by mouth 2 (two) times a day 180 tablet 1    [DISCONTINUED] rivaroxaban (Xarelto) 20 mg tablet Take 1 tablet (20 mg total) by mouth daily with breakfast 90 tablet 1    methocarbamol (ROBAXIN) 750 mg tablet Take 1 tablet (750 mg total) by mouth 4 (four) times a day (Patient not taking: Reported on 1/22/2024) 30 tablet 1     No current facility-administered medications on file prior to visit.      Social History     Tobacco Use    Smoking status: Never    Smokeless tobacco: Never   Vaping Use    Vaping status: Never Used   Substance and Sexual Activity    Alcohol use: Yes     Alcohol/week: 2.0 standard drinks of alcohol     Types: 2 Cans of beer per week    Drug use: No    Sexual activity: Yes     Partners: Female     Birth control/protection: None       Objective     /86 (BP Location: Left arm, Patient Position: Sitting, Cuff Size: Large)   Pulse 75   Temp 97.7 °F (36.5 °C) (Temporal)   Resp 16   Ht 5' 8\" (1.727 m)   Wt 92.1 kg (203 lb)   SpO2 96%   BMI 30.87 kg/m²     Physical Exam  Vitals and nursing note reviewed.   Constitutional:       Appearance: Normal appearance. He is well-developed.   HENT:      " Head: Normocephalic and atraumatic.   Eyes:      Extraocular Movements: Extraocular movements intact.      Pupils: Pupils are equal, round, and reactive to light.   Cardiovascular:      Rate and Rhythm: Normal rate and regular rhythm.   Pulmonary:      Effort: Pulmonary effort is normal.      Breath sounds: Normal breath sounds.   Abdominal:      General: Bowel sounds are normal.      Palpations: Abdomen is soft.   Musculoskeletal:      Cervical back: Normal range of motion.   Feet:      Comments: Right foot hammertoe numbness third digit  Skin:     General: Skin is warm and dry.   Neurological:      General: No focal deficit present.      Mental Status: He is alert and oriented to person, place, and time.   Psychiatric:         Mood and Affect: Mood normal.         Speech: Speech normal.         Behavior: Behavior normal.       Administrative Statements

## 2024-06-17 NOTE — PROGRESS NOTES
"Adult Annual Physical  Name: Juan Luis Muñoz      : 1960      MRN: [id number]  Encounter Provider: Joshua Melendez MD  Encounter Date: 2024   Encounter department: [facility]    Assessment & Plan   {There are no diagnoses linked to this encounter. (Refresh or delete this SmartLink)}  Immunizations and preventive care screenings were discussed with patient today. Appropriate education was printed on patient's after visit summary.    {Prostate cancer screening - consider in males between age of 40-75 depending on age, race, and risk factors. There is difference in the guidelines in regards to the optimal age for screening.  USPSTF states to consider periodic screening in males between the age of 50 to 69. This text is informational and does not need to be selected but if you wish, you can insert standard documentation in your progress note by using F2 (Optional):99488}    Counseling:  {Annual Physical; Counselin}         History of Present Illness   {Disappearing Hyperlinks I Encounters * My Last Note * Since Last Visit * History :93001}  Adult Annual Physical  Review of Systems  {Select to Display PMH (Optional):13465}    Objective   {Disappearing Hyperlinks   Review Vitals * Enter New Vitals * Results Review * Labs * Imaging * Cardiology * Procedures * Lung Cancer Screening :78236}  /86 (BP Location: Left arm, Patient Position: Sitting, Cuff Size: Large)   Pulse 75   Temp 97.7 °F (36.5 °C) (Temporal)   Resp 16   Ht 5' 8\" (1.727 m)   Wt 92.1 kg (203 lb)   SpO2 96%   BMI 30.87 kg/m²     Physical Exam  Administrative Statements {Disappearing Hyperlinks I  Level of Service * PCMH/PCSP:72486}  {Time Spent Statement (Optional):66761}      "

## 2024-07-06 LAB
ALBUMIN SERPL-MCNC: 4.4 G/DL (ref 3.6–5.1)
ALBUMIN/GLOB SERPL: 1.9 (CALC) (ref 1–2.5)
ALP SERPL-CCNC: 72 U/L (ref 35–144)
ALT SERPL-CCNC: 40 U/L (ref 9–46)
AST SERPL-CCNC: 41 U/L (ref 10–35)
BASOPHILS # BLD AUTO: 76 CELLS/UL (ref 0–200)
BASOPHILS NFR BLD AUTO: 0.6 %
BILIRUB SERPL-MCNC: 2.3 MG/DL (ref 0.2–1.2)
BUN SERPL-MCNC: 12 MG/DL (ref 7–25)
BUN/CREAT SERPL: ABNORMAL (CALC) (ref 6–22)
CALCIUM SERPL-MCNC: 9.6 MG/DL (ref 8.6–10.3)
CHLORIDE SERPL-SCNC: 100 MMOL/L (ref 98–110)
CHOLEST SERPL-MCNC: 204 MG/DL
CHOLEST/HDLC SERPL: 2.7 (CALC)
CO2 SERPL-SCNC: 26 MMOL/L (ref 20–32)
CREAT SERPL-MCNC: 0.87 MG/DL (ref 0.7–1.35)
EOSINOPHIL # BLD AUTO: 101 CELLS/UL (ref 15–500)
EOSINOPHIL NFR BLD AUTO: 0.8 %
ERYTHROCYTE [DISTWIDTH] IN BLOOD BY AUTOMATED COUNT: 12.7 % (ref 11–15)
GFR/BSA.PRED SERPLBLD CYS-BASED-ARV: 98 ML/MIN/1.73M2
GLOBULIN SER CALC-MCNC: 2.3 G/DL (CALC) (ref 1.9–3.7)
GLUCOSE SERPL-MCNC: 93 MG/DL (ref 65–99)
HCT VFR BLD AUTO: 50.6 % (ref 38.5–50)
HDLC SERPL-MCNC: 76 MG/DL
HGB BLD-MCNC: 18.3 G/DL (ref 13.2–17.1)
LDLC SERPL CALC-MCNC: 107 MG/DL (CALC)
LYMPHOCYTES # BLD AUTO: 1827 CELLS/UL (ref 850–3900)
LYMPHOCYTES NFR BLD AUTO: 14.5 %
MCH RBC QN AUTO: 35.3 PG (ref 27–33)
MCHC RBC AUTO-ENTMCNC: 36.2 G/DL (ref 32–36)
MCV RBC AUTO: 97.7 FL (ref 80–100)
MONOCYTES # BLD AUTO: 680 CELLS/UL (ref 200–950)
MONOCYTES NFR BLD AUTO: 5.4 %
NEUTROPHILS # BLD AUTO: 9916 CELLS/UL (ref 1500–7800)
NEUTROPHILS NFR BLD AUTO: 78.7 %
NONHDLC SERPL-MCNC: 128 MG/DL (CALC)
PLATELET # BLD AUTO: 142 THOUSAND/UL (ref 140–400)
PMV BLD REES-ECKER: 10 FL (ref 7.5–12.5)
POTASSIUM SERPL-SCNC: 4.1 MMOL/L (ref 3.5–5.3)
PROT SERPL-MCNC: 6.7 G/DL (ref 6.1–8.1)
PSA SERPL-MCNC: 8.42 NG/ML
RBC # BLD AUTO: 5.18 MILLION/UL (ref 4.2–5.8)
SODIUM SERPL-SCNC: 137 MMOL/L (ref 135–146)
TRIGL SERPL-MCNC: 110 MG/DL
VIT B12 SERPL-MCNC: 348 PG/ML (ref 200–1100)
WBC # BLD AUTO: 12.6 THOUSAND/UL (ref 3.8–10.8)

## 2024-07-09 ENCOUNTER — TELEPHONE (OUTPATIENT)
Dept: FAMILY MEDICINE CLINIC | Facility: CLINIC | Age: 64
End: 2024-07-09

## 2024-07-09 DIAGNOSIS — R97.20 ELEVATED PSA: Primary | ICD-10-CM

## 2024-07-09 NOTE — TELEPHONE ENCOUNTER
Called patient no response left message to call the office back was calling to give message please see below

## 2024-07-09 NOTE — TELEPHONE ENCOUNTER
----- Message from Joshua Melendez MD sent at 7/9/2024  3:38 PM EDT -----  Please call patient with results.  PSA elevated.  Have patient repeat PSA in 4 weeks.

## 2024-07-10 NOTE — TELEPHONE ENCOUNTER
I returned pts call. Providers result note given. Pt aware and understands. Please mail out his lab slip to him as he uses [facility].

## 2024-08-03 LAB
PSA FREE MFR SERPL: 11 % (CALC)
PSA FREE SERPL-MCNC: 0.9 NG/ML
PSA SERPL-MCNC: 7.9 NG/ML

## 2024-08-06 ENCOUNTER — TELEPHONE (OUTPATIENT)
Age: 64
End: 2024-08-06

## 2024-08-07 DIAGNOSIS — R97.20 ELEVATED PSA: Primary | ICD-10-CM

## 2024-08-08 ENCOUNTER — TELEPHONE (OUTPATIENT)
Age: 64
End: 2024-08-08

## 2024-08-08 NOTE — TELEPHONE ENCOUNTER
New Patient    What is the reason for the patient’s appointment? NP- referred by PCP for elevated PSA of 7.9. I gave him 1st available on 9/24. He said his PCP wanted him seen in no longer than 4 weeks, same as decision tree. Would someone triage the case and okay longer appointment? I    What office location does the patient prefer? [place]    Does patient have Imaging/Lab Results:  In Epic    Have patient records been requested?  If No, are the records showing in Epic:   In Epic    INSURANCE:   Do we accept the patient's insurance or is the patient Self-Pay?:  [employer]       HISTORY:   Has the patient had any previous Urologist(s)? No    Was the patient seen in the ED? No    Has the patient had any outside testing done? No    Does the patient have a personal history of cancer? unknown

## 2024-09-03 PROBLEM — R97.20 ELEVATED PSA: Status: ACTIVE | Noted: 2024-09-03

## 2024-09-03 NOTE — PROGRESS NOTES
Ambulatory Visit  Name: Juan Luis Muñoz      : 1960      MRN: [id number]  Encounter Provider: Feroz Brooks MD  Encounter Date: 2024   Encounter department: [facility] [place]    Assessment & Plan   1. Elevated PSA  Assessment & Plan:  I discussed with the patient the discovery of the PSA molecule and its original use in determining the return of prostate cancer after definitive therapy.  I described the normal function of the PSA molecule in the reproductive process and also discussed the detection of PSA in the blood.  We discussed the controversial history of PSA screening for prostate cancer in the [place] as well as the risk of over detection and over treatment of prostate cancer by way of PSA screening.  The patient understands that PSA blood testing is an imperfect way to screen for prostate cancer and that elevated PSA levels in the blood may also be caused by infection, inflammation, prostatic trauma or manipulation, urological procedures, or by benign prostatic enlargement.    The role of the digital rectal examination in prostate cancer screening was also discussed and I discussed with him that there is large interobserver variability in the findings of digital rectal examination.    We discussed the continued workup of elevated PSA or abnormal digital rectal examination in the form of the performance of a prostate biopsy.  The preparation for, and steps of, an office-based transrectal ultrasound of prostate biopsy were described to the patient. Benefits of obtaining tissue for pathologic analysis were discussed with him and the risks of prostate biopsy were also discussed at length.  These risks include but are not limited to infection, bleeding, pain, sepsis with need for admission to hospital, risk of change in sexual function, and risk of diagnosis with prostate cancer.  Alternatives to prostate biopsy in the form of continued PSA and MIGUEL ANGEL surveillance were also  offered to him.  All of his questions and concerns were answered and addressed with regard to that detailed above.    Given his comorbid medical conditions a multiparametric prostate MRI is recommended here.  If positive we would pursue a MRI targeted biopsy.  If negative for target lesions we would pursue a office-based prostate biopsy with stoppage of his anticoagulants for 3 days prior and resuming these 2 to 3 days thereafter if not having severe hematuria    Orders:  -     Basic metabolic panel; Future  -     MRI prostate multiparametric wo w contrast; Future; Expected date: 09/04/2024  -     Ambulatory referral to Urology  -     Basic metabolic panel  2. Anticoagulant long-term use  3. Factor V Leiden mutation ([facility])  Assessment & Plan:  History of DVT due to factor V Leiden mutation, he would need to come off of Xarelto for any office-based prostate biopsy.  He does have some chance of having rebound hypercoagulability and another DVT or venous thromboembolism event  4. Thrombocytopenia ([facility])  Assessment & Plan:  Chronic thrombocytopenia, this is not severe, this could theoretically increase his risk of bleeding after office-based prostate biopsy  5. Abnormal hemoglobin (Hgb) ([facility])  Assessment & Plan:  Longstanding elevated hemoglobin, he has followed with hematology for this, current hemoglobin around 18        History of Present Illness     Juan Luis Muñoz is a 63 y.o. male who presents for elevated psa    History as above    Referred in consultation by Joshua Melendez MD, today's note sent to him    The following portions of the patient's history were reviewed and updated as appropriate: allergies, current medications, past family history, past medical history, past social history, past surgical history and problem list.      Review of Systems   Constitutional: Negative.    HENT: Negative.     Eyes: Negative.    Respiratory: Negative.     Cardiovascular: Negative.    Gastrointestinal: Negative.   "  Endocrine: Negative.    Genitourinary: Negative.    Musculoskeletal: Negative.    Skin: Negative.    Allergic/Immunologic: Negative.    Neurological: Negative.    Hematological: Negative.    Psychiatric/Behavioral: Negative.           Objective     /92 (BP Location: Left arm, Patient Position: Sitting, Cuff Size: Large)   Pulse 81   Ht 5' 8\" (1.727 m)   Wt 91.9 kg (202 lb 9.6 oz)   SpO2 98%   BMI 30.81 kg/m²   Physical Exam  Vitals reviewed.   Constitutional:       General: He is not in acute distress.     Appearance: Normal appearance. He is well-developed. He is not ill-appearing, toxic-appearing or diaphoretic.   HENT:      Head: Normocephalic and atraumatic.      Nose: Nose normal.      Mouth/Throat:      Mouth: Mucous membranes are moist.   Eyes:      General: No scleral icterus.        Right eye: No discharge.         Left eye: No discharge.   Neck:      Thyroid: No thyromegaly.      Trachea: No tracheal deviation.   Pulmonary:      Effort: Pulmonary effort is normal.   Chest:      Chest wall: No tenderness.   Abdominal:      General: There is no distension.      Palpations: There is no mass.      Tenderness: There is no abdominal tenderness. There is no guarding.      Hernia: No hernia is present.   Genitourinary:     Comments: Smooth gland, 35 grams  Musculoskeletal:         General: No deformity or signs of injury. Normal range of motion.      Cervical back: Normal range of motion and neck supple.   Lymphadenopathy:      Cervical: No cervical adenopathy.   Skin:     General: Skin is dry.      Coloration: Skin is not jaundiced or pale.      Findings: No erythema.   Neurological:      Mental Status: He is alert and oriented to person, place, and time.      Cranial Nerves: No cranial nerve deficit.      Motor: No abnormal muscle tone.      Coordination: Coordination normal.   Psychiatric:         Mood and Affect: Mood normal.         Behavior: Behavior normal.         Thought Content: Thought " content normal.         Judgment: Judgment normal.       Results  Lab Results   Component Value Date    PSA 7.9 (H) 08/02/2024    PSA 8.42 (H) 07/05/2024     Lab Results   Component Value Date    GLUCOSE 93 11/01/2015    CALCIUM 9.6 07/05/2024     12/01/2017    K 4.1 07/05/2024    CO2 26 07/05/2024     07/05/2024    BUN 12 07/05/2024    CREATININE 0.87 07/05/2024     Lab Results   Component Value Date    WBC 12.6 (H) 07/05/2024    HGB 18.3 (H) 07/05/2024    HCT 50.6 (H) 07/05/2024    MCV 97.7 07/05/2024     07/05/2024       Office Urine Dip  No results found for this or any previous visit (from the past 1 hour(s)).]    Administrative Statements

## 2024-09-04 ENCOUNTER — OFFICE VISIT (OUTPATIENT)
Dept: UROLOGY | Facility: CLINIC | Age: 64
End: 2024-09-04
Payer: COMMERCIAL

## 2024-09-04 ENCOUNTER — TELEPHONE (OUTPATIENT)
Dept: UROLOGY | Facility: CLINIC | Age: 64
End: 2024-09-04

## 2024-09-04 VITALS
SYSTOLIC BLOOD PRESSURE: 144 MMHG | OXYGEN SATURATION: 98 % | DIASTOLIC BLOOD PRESSURE: 92 MMHG | WEIGHT: 202.6 LBS | HEART RATE: 81 BPM | HEIGHT: 68 IN | BODY MASS INDEX: 30.71 KG/M2

## 2024-09-04 DIAGNOSIS — D69.6 THROMBOCYTOPENIA (HCC): ICD-10-CM

## 2024-09-04 DIAGNOSIS — Z79.01 ANTICOAGULANT LONG-TERM USE: ICD-10-CM

## 2024-09-04 DIAGNOSIS — D68.51 FACTOR V LEIDEN MUTATION (HCC): ICD-10-CM

## 2024-09-04 DIAGNOSIS — D58.2 ABNORMAL HEMOGLOBIN (HGB) (HCC): ICD-10-CM

## 2024-09-04 DIAGNOSIS — R97.20 ELEVATED PSA: Primary | ICD-10-CM

## 2024-09-04 PROCEDURE — 99204 OFFICE O/P NEW MOD 45 MIN: CPT | Performed by: UROLOGY

## 2024-09-04 RX ORDER — MAGNESIUM 200 MG
TABLET ORAL EVERY OTHER DAY
COMMUNITY

## 2024-09-04 RX ORDER — DIPHENOXYLATE HYDROCHLORIDE AND ATROPINE SULFATE 2.5; .025 MG/1; MG/1
1 TABLET ORAL DAILY
COMMUNITY

## 2024-09-04 NOTE — ASSESSMENT & PLAN NOTE
Longstanding elevated hemoglobin, he has followed with hematology for this, current hemoglobin around 18

## 2024-09-04 NOTE — ASSESSMENT & PLAN NOTE
History of DVT due to factor V Leiden mutation, he would need to come off of Xarelto for any office-based prostate biopsy.  He does have some chance of having rebound hypercoagulability and another DVT or venous thromboembolism event

## 2024-09-04 NOTE — TELEPHONE ENCOUNTER
Return in about 6 weeks (around 10/16/2024) for review prostate MRI.     No follow up slot available.  Offered patient Monday 10/28 3:15 with Dr Brooks.    Please advise

## 2024-09-04 NOTE — ASSESSMENT & PLAN NOTE
Chronic thrombocytopenia, this is not severe, this could theoretically increase his risk of bleeding after office-based prostate biopsy

## 2024-09-04 NOTE — ASSESSMENT & PLAN NOTE
I discussed with the patient the discovery of the PSA molecule and its original use in determining the return of prostate cancer after definitive therapy.  I described the normal function of the PSA molecule in the reproductive process and also discussed the detection of PSA in the blood.  We discussed the controversial history of PSA screening for prostate cancer in the [place] as well as the risk of over detection and over treatment of prostate cancer by way of PSA screening.  The patient understands that PSA blood testing is an imperfect way to screen for prostate cancer and that elevated PSA levels in the blood may also be caused by infection, inflammation, prostatic trauma or manipulation, urological procedures, or by benign prostatic enlargement.    The role of the digital rectal examination in prostate cancer screening was also discussed and I discussed with him that there is large interobserver variability in the findings of digital rectal examination.    We discussed the continued workup of elevated PSA or abnormal digital rectal examination in the form of the performance of a prostate biopsy.  The preparation for, and steps of, an office-based transrectal ultrasound of prostate biopsy were described to the patient. Benefits of obtaining tissue for pathologic analysis were discussed with him and the risks of prostate biopsy were also discussed at length.  These risks include but are not limited to infection, bleeding, pain, sepsis with need for admission to hospital, risk of change in sexual function, and risk of diagnosis with prostate cancer.  Alternatives to prostate biopsy in the form of continued PSA and MIGUEL ANGEL surveillance were also offered to him.  All of his questions and concerns were answered and addressed with regard to that detailed above.    Given his comorbid medical conditions a multiparametric prostate MRI is recommended here.  If positive we would pursue a MRI targeted biopsy.  If  negative for target lesions we would pursue a office-based prostate biopsy with stoppage of his anticoagulants for 3 days prior and resuming these 2 to 3 days thereafter if not having severe hematuria

## 2024-09-23 ENCOUNTER — TELEPHONE (OUTPATIENT)
Dept: UROLOGY | Facility: CLINIC | Age: 64
End: 2024-09-23

## 2024-09-23 DIAGNOSIS — R97.20 ELEVATED PSA: Primary | ICD-10-CM

## 2024-09-23 NOTE — TELEPHONE ENCOUNTER
Relayed to patient that the MRI was denied by his insurance. Relayed that he should keep follow up with Dr. Brooks as scheduled and have a repeat PSA prior. Patient agreeable to this.  Orders are in. He has to go to [facility] to have done. Will mychart message him the fax number so he can have them fax us the results. Verbalized understanding

## 2024-09-23 NOTE — TELEPHONE ENCOUNTER
===View-only below this line===  ----- Message -----  From: Carol Pierce  Sent: 9/23/2024  12:58 PM EDT  To: [facility] [place] [facility]  Subject: FW: P2P request for MRI prostate- Dr. Brooks*    Good Afternoon,    Please see note below from Rito. Please contact patient ans advise him that MRI was denied and what the next step is for the office visit.     Thank you!  ----- Message -----  From: Marty Guerra PA-C  Sent: 9/23/2024  11:25 AM EDT  To: Carol Pierce  Subject: RE: P2P request for MRI prostate- Dr. Brooks*    If his MRI is not authorized then he will need a office biopsy.  I recommend he repeat his PSA prior to his next appointment and discuss a prostate biopsy at that time.

## 2024-09-30 NOTE — TELEPHONE ENCOUNTER
Patient called in stating he has not received his PSA lab order. I advised the office is mailing today for him. If he does not receive it, to call the office back. Patient appreciative of information.

## 2024-10-14 NOTE — TELEPHONE ENCOUNTER
Follow-up with one of the provided dental clinics below:    [facility] [phone]  [facility] -Extraction [phone]  [facility] [phone], [phone]  [facility] [phone]  [facility] [phone]  [facility] [phone]  [facility] [phone], [phone]      Call to schedule an appointment.    Patient is calling back in regarding if he should still get his US of Kidney and Bladder scheduled for 11/15/23. He did call last week but did not receive a response back.   Please respond asap

## 2024-10-22 NOTE — PROGRESS NOTES
Ambulatory Visit  Name: Juan Luis Muñoz      : 1960      MRN: [id number]  Encounter Provider: Feroz Brooks MD  Encounter Date: 10/25/2024   Encounter department: [facility] [place]    Assessment & Plan  Elevated PSA  We had wanted an MRI prostate but his insurance will not approve  I discussed prostate biopsy with him today in the office setting  He will need to hold xarelto 3 days prior and 2-3 days after his biopsy  He understands the risks of infection, bleeding (increased in his case), and risks of hematuria and hematospermia and hematochezia    Return for office based prostate biopsy  Orders:    bisacodyl (FLEET) 10 MG/30ML ENEM; Insert 30 mL (10 mg total) into the rectum once for 1 dose Use this enema the morning of your prostate biopsy to cleanse your rectum of stool burden.    Factor V Leiden mutation ([facility])               History of Present Illness     Juan Luis Muñoz is a 63 y.o. male who presents with elevated psa (as outlined below)    His insurance wouldn't cover a prostate MRI    The following portions of the patient's history were reviewed and updated as appropriate: allergies, current medications, past family history, past medical history, past social history, past surgical history and problem list.        Review of Systems   Constitutional: Negative.    HENT: Negative.     Eyes: Negative.    Respiratory: Negative.     Cardiovascular: Negative.    Gastrointestinal: Negative.    Endocrine: Negative.    Genitourinary: Negative.    Musculoskeletal: Negative.    Skin: Negative.    Allergic/Immunologic: Negative.    Neurological: Negative.    Hematological: Negative.    Psychiatric/Behavioral: Negative.             AUA SYMPTOM SCORE      Flowsheet Row Most Recent Value   AUA SYMPTOM SCORE    How often have you had a sensation of not emptying your bladder completely after you finished urinating? 0 (P)     How often have you had to urinate again less than two hours after you finished  urinating? 0 (P)     How often have you found you stopped and started again several times when you urinate? 0 (P)     How often have you found it difficult to postpone urination? 0 (P)     How often have you had a weak urinary stream? 0 (P)     How often have you had to push or strain to begin urination? 0 (P)     How many times did you most typically get up to urinate from the time you went to bed at night until the time you got up in the morning? 0 (P)     Quality of Life: If you were to spend the rest of your life with your urinary condition just the way it is now, how would you feel about that? 1 (P)     AUA SYMPTOM SCORE 0 (P)            Objective     There were no vitals taken for this visit.  Physical Exam  Vitals and nursing note reviewed.   Constitutional:       General: He is not in acute distress.     Appearance: Normal appearance. He is well-developed. He is not ill-appearing, toxic-appearing or diaphoretic.   HENT:      Head: Normocephalic and atraumatic.   Eyes:      General: No scleral icterus.  Pulmonary:      Effort: Pulmonary effort is normal. No respiratory distress.   Abdominal:      General: There is no distension.      Palpations: Abdomen is soft.      Tenderness: There is no abdominal tenderness.   Musculoskeletal:         General: No deformity.      Cervical back: Neck supple.   Skin:     Coloration: Skin is not jaundiced or pale.   Neurological:      Mental Status: He is alert and oriented to person, place, and time. Mental status is at baseline.      Cranial Nerves: No cranial nerve deficit.      Motor: No weakness.      Coordination: Coordination normal.   Psychiatric:         Mood and Affect: Mood normal.         Behavior: Behavior normal.         Thought Content: Thought content normal.         Judgment: Judgment normal.       Results  Lab Results   Component Value Date    PSA 7.9 (H) 08/02/2024    PSA 8.42 (H) 07/05/2024     Lab Results   Component Value Date    GLUCOSE 93 11/01/2015     CALCIUM 9.6 07/05/2024     12/01/2017    K 4.1 07/05/2024    CO2 26 07/05/2024     07/05/2024    BUN 12 07/05/2024    CREATININE 0.87 07/05/2024     Lab Results   Component Value Date    WBC 12.6 (H) 07/05/2024    HGB 18.3 (H) 07/05/2024    HCT 50.6 (H) 07/05/2024    MCV 97.7 07/05/2024     07/05/2024       Office Urine Dip  No results found for this or any previous visit (from the past 1 hour(s)).]    Administrative Statements

## 2024-10-25 ENCOUNTER — OFFICE VISIT (OUTPATIENT)
Dept: UROLOGY | Facility: CLINIC | Age: 64
End: 2024-10-25
Payer: COMMERCIAL

## 2024-10-25 VITALS
WEIGHT: 205 LBS | HEART RATE: 76 BPM | DIASTOLIC BLOOD PRESSURE: 64 MMHG | HEIGHT: 69 IN | BODY MASS INDEX: 30.36 KG/M2 | SYSTOLIC BLOOD PRESSURE: 155 MMHG | OXYGEN SATURATION: 98 %

## 2024-10-25 DIAGNOSIS — R97.20 ELEVATED PSA: Primary | ICD-10-CM

## 2024-10-25 DIAGNOSIS — D68.51 FACTOR V LEIDEN MUTATION (HCC): ICD-10-CM

## 2024-10-25 PROCEDURE — 99213 OFFICE O/P EST LOW 20 MIN: CPT | Performed by: UROLOGY

## 2024-10-25 NOTE — ASSESSMENT & PLAN NOTE
We had wanted an MRI prostate but his insurance will not approve  I discussed prostate biopsy with him today in the office setting  He will need to hold xarelto 3 days prior and 2-3 days after his biopsy  He understands the risks of infection, bleeding (increased in his case), and risks of hematuria and hematospermia and hematochezia    Return for office based prostate biopsy  Orders:    bisacodyl (FLEET) 10 MG/30ML ENEM; Insert 30 mL (10 mg total) into the rectum once for 1 dose Use this enema the morning of your prostate biopsy to cleanse your rectum of stool burden.

## 2024-10-29 ENCOUNTER — OFFICE VISIT (OUTPATIENT)
Dept: FAMILY MEDICINE CLINIC | Facility: CLINIC | Age: 64
End: 2024-10-29
Payer: COMMERCIAL

## 2024-10-29 VITALS
RESPIRATION RATE: 16 BRPM | BODY MASS INDEX: 30.21 KG/M2 | HEART RATE: 63 BPM | TEMPERATURE: 97.5 F | SYSTOLIC BLOOD PRESSURE: 132 MMHG | HEIGHT: 69 IN | WEIGHT: 204 LBS | DIASTOLIC BLOOD PRESSURE: 84 MMHG | OXYGEN SATURATION: 100 %

## 2024-10-29 DIAGNOSIS — K76.0 HEPATIC STEATOSIS: ICD-10-CM

## 2024-10-29 DIAGNOSIS — R97.20 ELEVATED PSA: ICD-10-CM

## 2024-10-29 DIAGNOSIS — K22.2 SCHATZKI'S RING: ICD-10-CM

## 2024-10-29 DIAGNOSIS — R14.0 POSTPRANDIAL ABDOMINAL BLOATING: Primary | ICD-10-CM

## 2024-10-29 DIAGNOSIS — K21.9 GASTROESOPHAGEAL REFLUX DISEASE WITHOUT ESOPHAGITIS: ICD-10-CM

## 2024-10-29 PROCEDURE — 99214 OFFICE O/P EST MOD 30 MIN: CPT | Performed by: FAMILY MEDICINE

## 2024-10-29 NOTE — ASSESSMENT & PLAN NOTE
Continue following with urology for elevated PSA.  Plan for prostate biopsy.  Insurance would not cover MRI of the prostate

## 2024-10-29 NOTE — ASSESSMENT & PLAN NOTE
Fib 4 score 2.83.  Repeat ultrasound elastography.  Referral placed to gastroenterology  Orders:    US elastography/UGAP; Future

## 2024-10-29 NOTE — PROGRESS NOTES
Ambulatory Visit  Name: Juan Luis Muñoz      : 1960      MRN: [id number]  Encounter Provider: Joshua Melendez MD  Encounter Date: 10/29/2024   Encounter department: [facility]    Assessment & Plan  Postprandial abdominal bloating  Worsening postprandial abdominal bloating.  Start omeprazole.  Will refer to gastroenterology for likely EGD.  History of Schatzki's ring.  Orders:    Ambulatory Referral to Gastroenterology; Future    omeprazole (PriLOSEC) 20 mg delayed release capsule; Take 1 capsule (20 mg total) by mouth daily before breakfast    Schatzki's ring    Orders:    Ambulatory Referral to Gastroenterology; Future    Elevated PSA  Continue following with urology for elevated PSA.  Plan for prostate biopsy.  Insurance would not cover MRI of the prostate       Gastroesophageal reflux disease without esophagitis  Omeprazole 20 mg daily.       Hepatic steatosis  Fib 4 score 2.83.  Repeat ultrasound elastography.  Referral placed to gastroenterology  Orders:    US elastography/UGAP; Future       History of Present Illness     Patient presents with:  Follow-up: Discuss elevated PSA/ gastro issues    PSA elevated. Insurance would not cover MRI  Feeling bloated and full.  Feels he cannot eat and has a poor appetite.  Denies any dark stools or blood in stools.  History of Schatzki's ring.          Review of Systems   Constitutional:  Negative for activity change, fatigue and fever.   Eyes:  Negative for visual disturbance.   Respiratory:  Negative for shortness of breath.    Cardiovascular:  Negative for chest pain.   Gastrointestinal:  Negative for abdominal pain, constipation, diarrhea and nausea.        Abdominal bloating after eating   Endocrine: Negative for cold intolerance and heat intolerance.   Musculoskeletal:  Negative for back pain.   Skin:  Negative for rash.   Neurological:  Negative for headaches.   Psychiatric/Behavioral:  Negative for confusion.            Objective  "    /84 (BP Location: Left arm, Patient Position: Sitting, Cuff Size: Standard)   Pulse 63   Temp 97.5 °F (36.4 °C) (Temporal)   Resp 16   Ht 5' 9\" (1.753 m)   Wt 92.5 kg (204 lb)   SpO2 100%   BMI 30.13 kg/m²     Physical Exam  Vitals and nursing note reviewed.   Constitutional:       Appearance: Normal appearance. He is well-developed.   HENT:      Head: Normocephalic and atraumatic.   Cardiovascular:      Rate and Rhythm: Normal rate and regular rhythm.   Pulmonary:      Effort: Pulmonary effort is normal.      Breath sounds: Normal breath sounds.   Abdominal:      General: Bowel sounds are normal.      Palpations: Abdomen is soft.   Musculoskeletal:      Cervical back: Normal range of motion.   Skin:     General: Skin is warm.   Neurological:      General: No focal deficit present.      Mental Status: He is alert.   Psychiatric:         Mood and Affect: Mood normal.         Speech: Speech normal.         "

## 2024-11-14 ENCOUNTER — HOSPITAL ENCOUNTER (OUTPATIENT)
Dept: ULTRASOUND IMAGING | Facility: HOSPITAL | Age: 64
End: 2024-11-14
Attending: FAMILY MEDICINE
Payer: COMMERCIAL

## 2024-11-14 DIAGNOSIS — K76.0 HEPATIC STEATOSIS: ICD-10-CM

## 2024-11-14 PROCEDURE — 76981 USE PARENCHYMA: CPT

## 2024-11-22 ENCOUNTER — RESULTS FOLLOW-UP (OUTPATIENT)
Dept: FAMILY MEDICINE CLINIC | Facility: CLINIC | Age: 64
End: 2024-11-22

## 2024-12-16 ENCOUNTER — OFFICE VISIT (OUTPATIENT)
Dept: FAMILY MEDICINE CLINIC | Facility: CLINIC | Age: 64
End: 2024-12-16
Payer: COMMERCIAL

## 2024-12-16 VITALS
RESPIRATION RATE: 16 BRPM | SYSTOLIC BLOOD PRESSURE: 112 MMHG | TEMPERATURE: 98.5 F | OXYGEN SATURATION: 98 % | WEIGHT: 200 LBS | HEART RATE: 72 BPM | HEIGHT: 69 IN | DIASTOLIC BLOOD PRESSURE: 76 MMHG | BODY MASS INDEX: 29.62 KG/M2

## 2024-12-16 DIAGNOSIS — K57.92 DIVERTICULITIS: Primary | ICD-10-CM

## 2024-12-16 PROCEDURE — 99214 OFFICE O/P EST MOD 30 MIN: CPT

## 2024-12-16 NOTE — PROGRESS NOTES
Name: Juan Luis Muñoz      : 1960      MRN: [id number]  Encounter Provider: Amauri Trent DO  Encounter Date: 2024   Encounter department: [facility]  :  Assessment & Plan  Diverticulitis  Patient with 3 days of worsening lower abdominal pain bilaterally but noted left-sided more than right yesterday.  Minimal p.o. intake.   Has a history of multiple diverticula in the hepatic flexure, descending colon and sigmoid colon per last colonoscopy in .  Due to persistent and worsening symptoms, will treat as diverticulitis with Augmentin as below, side effects reviewed  Patient does have follow-up with GI on Friday for separate issue.  Recommend adequate hydration and slowly advance diet as tolerated  ED/return precautions extensively reviewed.  Patient understands and agrees to plan  Orders:    amoxicillin-clavulanate (AUGMENTIN) 875-125 mg per tablet; Take 1 tablet by mouth every 12 (twelve) hours for 10 days           History of Present Illness     HPI    64-year-old male presents for for abdominal pain with bloating and constipation since Friday. Took a stool softener Fri and Sat with no relief. Saturday night developed severe pain and cramping.  Had some rectal bleeding with liquid stool. Felt a little better yesterday, cramping eased until the evening.  Woke up with diarrhea x 2 in the middle of the night- dark brown, no blood. Did not take Xarelto last night. This morning, continues to have abdominal pain, cramping lower abdominal pain bilaterally, more left sided yesterday. Had some chills last night, tylenol with some relief.     Was not able to tolerate any solid foods yesterday but has been hydrating.    Review of Systems   Constitutional:  Positive for appetite change and chills. Negative for fever.   HENT:  Negative for congestion and rhinorrhea.    Eyes:  Negative for visual disturbance.   Respiratory:  Negative for cough and shortness of breath.    Cardiovascular:   "Negative for chest pain and palpitations.   Gastrointestinal:  Positive for abdominal pain, blood in stool and diarrhea. Negative for constipation, nausea and vomiting.   Neurological:  Negative for dizziness, light-headedness and headaches.       Objective   /76 (BP Location: Left arm, Patient Position: Sitting, Cuff Size: Large)   Pulse 72   Temp 98.5 °F (36.9 °C) (Temporal)   Resp 16   Ht 5' 9\" (1.753 m)   Wt 90.7 kg (200 lb)   SpO2 98%   BMI 29.53 kg/m²      Physical Exam  Vitals reviewed.   Constitutional:       General: He is not in acute distress.     Appearance: Normal appearance.   HENT:      Head: Normocephalic and atraumatic.      Right Ear: External ear normal.      Left Ear: External ear normal.      Nose: Nose normal.      Mouth/Throat:      Pharynx: Oropharynx is clear.   Eyes:      Conjunctiva/sclera: Conjunctivae normal.   Cardiovascular:      Rate and Rhythm: Normal rate and regular rhythm.      Pulses: Normal pulses.      Heart sounds: Normal heart sounds.   Pulmonary:      Effort: Pulmonary effort is normal.      Breath sounds: Normal breath sounds.   Abdominal:      Palpations: Abdomen is soft.      Tenderness: There is abdominal tenderness. There is no guarding or rebound.   Musculoskeletal:      Right lower leg: No edema.      Left lower leg: No edema.   Skin:     General: Skin is warm.   Neurological:      Mental Status: He is alert and oriented to person, place, and time.   Psychiatric:         Mood and Affect: Mood normal.         Behavior: Behavior normal.       Administrative Statements   I have spent a total time of 30 minutes in caring for this patient on the day of the visit/encounter including Prognosis, Risks and benefits of tx options, Instructions for management, Importance of tx compliance, Risk factor reductions, Documenting in the medical record, Reviewing / ordering tests, medicine, procedures  , and Obtaining or reviewing history  .   "

## 2024-12-20 ENCOUNTER — OFFICE VISIT (OUTPATIENT)
Dept: GASTROENTEROLOGY | Facility: AMBULARY SURGERY CENTER | Age: 64
End: 2024-12-20
Payer: COMMERCIAL

## 2024-12-20 VITALS
HEART RATE: 67 BPM | OXYGEN SATURATION: 97 % | DIASTOLIC BLOOD PRESSURE: 88 MMHG | HEIGHT: 69 IN | WEIGHT: 200.2 LBS | BODY MASS INDEX: 29.65 KG/M2 | SYSTOLIC BLOOD PRESSURE: 142 MMHG

## 2024-12-20 DIAGNOSIS — K22.2 SCHATZKI'S RING: ICD-10-CM

## 2024-12-20 DIAGNOSIS — R74.8 ELEVATED LIVER ENZYMES: ICD-10-CM

## 2024-12-20 DIAGNOSIS — R14.0 POSTPRANDIAL ABDOMINAL BLOATING: ICD-10-CM

## 2024-12-20 DIAGNOSIS — Z86.0100 HISTORY OF COLON POLYPS: Primary | ICD-10-CM

## 2024-12-20 DIAGNOSIS — K21.9 GASTROESOPHAGEAL REFLUX DISEASE, UNSPECIFIED WHETHER ESOPHAGITIS PRESENT: ICD-10-CM

## 2024-12-20 DIAGNOSIS — K57.92 DIVERTICULITIS: ICD-10-CM

## 2024-12-20 PROCEDURE — 99204 OFFICE O/P NEW MOD 45 MIN: CPT | Performed by: INTERNAL MEDICINE

## 2024-12-20 RX ORDER — SODIUM CHLORIDE, SODIUM LACTATE, POTASSIUM CHLORIDE, CALCIUM CHLORIDE 600; 310; 30; 20 MG/100ML; MG/100ML; MG/100ML; MG/100ML
125 INJECTION, SOLUTION INTRAVENOUS CONTINUOUS
OUTPATIENT
Start: 2024-12-20

## 2024-12-20 NOTE — PROGRESS NOTES
Name: Juan Luis Muñoz      : 1960      MRN: [id number]  Encounter Provider: Jas Singh MD  Encounter Date: 2024   Encounter department: [facility]  :  Assessment & Plan  Schatzki's ring  -Underwent dilation in 2020, has been having postprandial abdominal bloating, acid reflux symptoms for the past few months, symptoms slightly improved since stopping alcohol 1 month ago.  Orders:    Ambulatory Referral to Gastroenterology    Chronic Hepatitis Panel; Future    LUIS ALBERTO Screen w/Reflex Cascade; Future    Antimitochondrial antibody; Future    Alpha-1-antitrypsin; Future    Anti-smooth muscle antibody, IgG; Future    Ceruloplasmin; Future    Celiac Disease Panel; Future    EGD; Future    Postprandial abdominal bloating  -Worsening postprandial abdominal bloating over the past few months, was started on omeprazole which she was taking, has recently stopped alcohol intake altogether with symptomatic improvement therefore has cut down on omeprazole.  -Given patient has been having recent worsening symptoms, would recommend EGD for further evaluation and to assess for Dalal's esophagus.  Orders:    Ambulatory Referral to Gastroenterology    Chronic Hepatitis Panel; Future    LUIS ALBERTO Screen w/Reflex Cascade; Future    Antimitochondrial antibody; Future    Alpha-1-antitrypsin; Future    Anti-smooth muscle antibody, IgG; Future    Ceruloplasmin; Future    Celiac Disease Panel; Future    EGD; Future    History of colon polyps  -Noted to have sessile serrated adenoma on colonoscopy in 2020, was recommended repeat at 5-year interval.  Orders:    Chronic Hepatitis Panel; Future    LUIS ALBERTO Screen w/Reflex Cascade; Future    Antimitochondrial antibody; Future    Alpha-1-antitrypsin; Future    Anti-smooth muscle antibody, IgG; Future    Ceruloplasmin; Future    Celiac Disease Panel; Future    Colonoscopy; Future    Elevated liver enzymes  -Likely due to alcoholic hepatitis/MASH.   Ultrasound elastography notable for F3 fibrosis and S3 steatosis, reports significant alcohol use over the past 40 years, reports drinking on daily basis.  No family history of liver disease.  No prior history of IV drug use or viral hepatitis.  Does report some postprandial abdominal bloating and discomfort over the past few months.  -Noted to have elevated AST of 41, bilirubin of 2.3.  Platelet count is 142.  Ultrasound elastography last month notable for F3 fibrosis and S3 steatosis.  -Recommend checking chronic hepatitis panel.  -Recommend checking LUIS ALBERTO, AMA, anti-smooth muscle antibody, ceruloplasmin, alpha-1 antitrypsin to rule out any other chronic causes of liver disease.  -Repeat liver enzymes, CBC and INR.  -Given elevation of bilirubin, would recommend MRI/MRCP to assess for any biliary obstruction given symptoms of postprandial abdominal bloating.  -CAT scan performed 1 year ago without any evidence of gallstones or pericholecystic inflammatory changes.  There was mild splenomegaly.  Orders:    Chronic Hepatitis Panel; Future    LUIS ALBERTO Screen w/Reflex Cascade; Future    Antimitochondrial antibody; Future    Alpha-1-antitrypsin; Future    Anti-smooth muscle antibody, IgG; Future    Ceruloplasmin; Future    Celiac Disease Panel; Future    CBC and Platelet; Future    Comprehensive metabolic panel; Future    Protime-INR; Future    MRI abdomen w wo contrast and mrcp; Future    Gastroesophageal reflux disease, unspecified whether esophagitis present  -See above.  Orders:    EGD; Future    Diverticulitis  -Patient is currently being treated with Augmentin for episode of diverticulitis last week, reports that he has severe suprapubic abdominal pain and diarrhea along with small months of rectal bleeding.  He reports that this has improved since starting the antibiotics, reports that stool is still loose.  -Would recommend adding probiotics to antibiotics.  -Recommend low residue diet for the next 1 to 2 weeks  followed by high-fiber diet.  -Recommend colonoscopy in about 6 weeks to assess for any colorectal polyps or lesions accounting for episode of diverticulitis, patient has had history of colon polyps.  -Patient was explained about the risks and benefits of the procedure. Risks including but not limited to bleeding, infection, perforation were explained in detail. Also explained about less than 100% sensitivity with the exam and other alternatives.  -Xarelto will need to be held 2 days before the procedure.  -Rectal MiraLAX/Dulcolax bowel prep.  Orders:    Colonoscopy; Future        History of Present Illness   HPI  Juan Luis Muñoz is a 64 y.o. male with history of hypertension, DVT, on Xarelto, history of diverticulitis, presents for initial evaluation.  Patient reports that he had suprapubic abdominal pain associated with small months of rectal bleeding last week.  He was subsequently prescribed Augmentin by the PCP.  Patient reports that he has been feeling better, reports that abdominal pain has subsided, reports that the stool is loose since being on antibiotics.    He also reports that over the past few months he has been having worsening postprandial abdominal bloating, nausea and acid reflux symptoms.  He underwent ultrasound elastography 1 month ago which was notable for severe fibrosis and steatosis after which patient quit drinking altogether.  Patient reports that this has resulted in some symptomatic improvement as far as upper GI symptoms are concerned.  He reports that he was taking omeprazole as prescribed by his PCP however has cut this down due to symptomatic improvement.  He denies any melena.  He denies hematemesis or coffee-ground emesis.  He reports occasional symptoms of dysphagia, reports that that has improved previously with dilation.    No family history of liver disease.  Denies any regular use of NSAIDs.  Denies any history of IV drug use.  No prior history of viral hepatitis.    Patient  "recently underwent ultrasound elastography notable for F3 severe fibrosis and S3 steatosis.    Liver enzymes are notable for AST of 41, ALT 40, bilirubin is 2.3.  Most recent platelet count is 142.    Colonoscopy in November 2020 notable for single colon polyp (sessile serrated adenoma), diverticulosis and was recommended repeat colonoscopy at 5-year interval.    EGD in 2020 notable for 2 cm hiatal hernia with peptic stricture for which he underwent dilation.      Review of Systems   Constitutional: Negative.    HENT: Negative.     Eyes: Negative.    Respiratory: Negative.     Cardiovascular: Negative.    Gastrointestinal:         See HPI.   Endocrine: Negative.    Genitourinary: Negative.    Musculoskeletal: Negative.    Skin: Negative.    Allergic/Immunologic: Negative.    Neurological: Negative.    Hematological: Negative.    Psychiatric/Behavioral: Negative.     All other systems reviewed and are negative.         Objective   /88 (BP Location: Left arm, Patient Position: Sitting, Cuff Size: Standard)   Pulse 67   Ht 5' 9\" (1.753 m)   Wt 90.8 kg (200 lb 3.2 oz)   SpO2 97%   BMI 29.56 kg/m²      Physical Exam  Vitals and nursing note reviewed.   Constitutional:       General: He is not in acute distress.     Appearance: He is well-developed.   HENT:      Head: Normocephalic and atraumatic.   Eyes:      Conjunctiva/sclera: Conjunctivae normal.   Cardiovascular:      Rate and Rhythm: Normal rate and regular rhythm.      Heart sounds: No murmur heard.  Pulmonary:      Effort: Pulmonary effort is normal. No respiratory distress.      Breath sounds: Normal breath sounds.   Abdominal:      Palpations: Abdomen is soft.      Tenderness: There is no abdominal tenderness.   Musculoskeletal:         General: No swelling.      Cervical back: Neck supple.   Skin:     General: Skin is warm and dry.      Capillary Refill: Capillary refill takes less than 2 seconds.   Neurological:      Mental Status: He is alert. "   Psychiatric:         Mood and Affect: Mood normal.

## 2024-12-20 NOTE — ASSESSMENT & PLAN NOTE
-Underwent dilation in 2020, has been having postprandial abdominal bloating, acid reflux symptoms for the past few months, symptoms slightly improved since stopping alcohol 1 month ago.  Orders:    Ambulatory Referral to Gastroenterology    Chronic Hepatitis Panel; Future    LUIS ALBERTO Screen w/Reflex Cascade; Future    Antimitochondrial antibody; Future    Alpha-1-antitrypsin; Future    Anti-smooth muscle antibody, IgG; Future    Ceruloplasmin; Future    Celiac Disease Panel; Future    EGD; Future

## 2024-12-20 NOTE — ASSESSMENT & PLAN NOTE
-Noted to have sessile serrated adenoma on colonoscopy in 2020, was recommended repeat at 5-year interval.  Orders:    Chronic Hepatitis Panel; Future    LUIS ALBERTO Screen w/Reflex Cascade; Future    Antimitochondrial antibody; Future    Alpha-1-antitrypsin; Future    Anti-smooth muscle antibody, IgG; Future    Ceruloplasmin; Future    Celiac Disease Panel; Future    Colonoscopy; Future

## 2024-12-24 ENCOUNTER — TELEPHONE (OUTPATIENT)
Age: 64
End: 2024-12-24

## 2024-12-24 NOTE — TELEPHONE ENCOUNTER
Patient calling with questions regarding Prostate Biopsy on Monday 12/30/24    Answered all questions and concerns. Confirmed will hold Xarelto 3 days before procedure

## 2024-12-30 ENCOUNTER — PROCEDURE VISIT (OUTPATIENT)
Dept: UROLOGY | Facility: MEDICAL CENTER | Age: 64
End: 2024-12-30
Payer: COMMERCIAL

## 2024-12-30 VITALS
OXYGEN SATURATION: 96 % | BODY MASS INDEX: 28.68 KG/M2 | HEART RATE: 63 BPM | DIASTOLIC BLOOD PRESSURE: 80 MMHG | HEIGHT: 69 IN | WEIGHT: 193.6 LBS | SYSTOLIC BLOOD PRESSURE: 118 MMHG

## 2024-12-30 DIAGNOSIS — R97.20 ELEVATED PSA: Primary | ICD-10-CM

## 2024-12-30 PROCEDURE — 88341 IMHCHEM/IMCYTCHM EA ADD ANTB: CPT | Performed by: PATHOLOGY

## 2024-12-30 PROCEDURE — 88342 IMHCHEM/IMCYTCHM 1ST ANTB: CPT | Performed by: PATHOLOGY

## 2024-12-30 PROCEDURE — 76942 ECHO GUIDE FOR BIOPSY: CPT | Performed by: UROLOGY

## 2024-12-30 PROCEDURE — 55700 PR PROSTATE NEEDLE BIOPSY ANY APPROACH: CPT | Performed by: UROLOGY

## 2024-12-30 PROCEDURE — 88344 IMHCHEM/IMCYTCHM EA MLT ANTB: CPT | Performed by: PATHOLOGY

## 2024-12-30 PROCEDURE — 96372 THER/PROPH/DIAG INJ SC/IM: CPT

## 2024-12-30 PROCEDURE — G0416 PROSTATE BIOPSY, ANY MTHD: HCPCS | Performed by: PATHOLOGY

## 2024-12-30 RX ORDER — CEFTRIAXONE 1 G/1
1000 INJECTION, POWDER, FOR SOLUTION INTRAMUSCULAR; INTRAVENOUS ONCE
Status: COMPLETED | OUTPATIENT
Start: 2024-12-30 | End: 2024-12-30

## 2024-12-30 RX ADMIN — CEFTRIAXONE 1000 MG: 1 INJECTION, POWDER, FOR SOLUTION INTRAMUSCULAR; INTRAVENOUS at 13:22

## 2024-12-30 NOTE — PROGRESS NOTES
Biopsy prostate     Date/Time  12/30/2024 1:30 PM     Performed by  Andre Everett MD   Authorized by  Andre Everett MD     [place] Protocol   Consent: Verbal consent obtained. Written consent obtained.  Risks and benefits: risks, benefits and alternatives were discussed  Consent given by: patient  Patient understanding: patient states understanding of the procedure being performed  Patient consent: the patient's understanding of the procedure matches consent given  Procedure consent: procedure consent matches procedure scheduled  Patient identity confirmed: verbally with patient      Local anesthesia used: yes      Anesthesia: local infiltration and nerve block     Anesthesia   Local anesthesia used: yes  Local Anesthetic: lidocaine 2% without epinephrine  Anesthetic total: 10 mL     Sedation   Patient sedated: no        Specimen: yes    Culture: no   Procedure Details   Procedure Notes:   The patient was brought to the procedure room and properly identified.  Rocephin 1gm was given intramuscularly as ordered.    The patient was then placed in the left lateral position.  Digital rectal examination was performed. MIGUEL ANGEL reveals a small benign feeling prostate.  Betadine was instilled into the rectum.  The transrectal ultrasound probe was placed in the rectum.  Transrectal ultrasound of prostate then commenced.      Findings on ultrasound:   The prostate measures 3.86 cm in length, 3.54 cm in height and 4.94 cm in width.  Volume is estimated at 35 g.  There are no hypoechoic lesions in the peripheral zone.  There are dense calcifications at the border of the peripheral zone and transition zone.  No significant median lobe.  The seminal vesicles are normal.  The visualized bladder is unremarkable.      After completion  of the ultrasound a prostate block was administered in standard fashion using 2% xylocaine without epinephrine and a spinal needle.    Transrectal ultrasound-guided biopsies were then obtained.   12 biopsies were obtained with standard template.  Biopsies were directed medially and laterally on both sides of the gland at the base, mid gland and apex.    No roel prostatic hematoma was noted.    The procedure was well tolerated.  The patient was given discharge instructions.  He left the procedure room in satisfactory condition.    Patient Transportation: confirmed  Patient tolerance: patient tolerated the procedure well with no immediate complications

## 2024-12-30 NOTE — PATIENT INSTRUCTIONS
"  Patient Education     Prostate biopsy   The Basics   Written by the doctors and editors at [facility]   What is a prostate biopsy? -- A prostate biopsy is a procedure to check the prostate for cancer or other problems. The prostate is a gland that surrounds the urethra (the tube that carries urine from the bladder out through the penis) (figure 1). For a biopsy, a doctor takes a small sample of tissue from the prostate.  You might get a prostate biopsy if:   A blood test shows that your PSA level is high - PSA stands for \"prostate-specific antigen.\" It is a protein made by the prostate. PSA levels usually go up when a person has prostate cancer. But they can also go up for reasons that do not involve cancer. A prostate biopsy can help your doctor figure out the cause of a high PSA.   Your doctor finds a problem during a \"digital rectal exam\" - For a digital rectal exam, your doctor puts a gloved finger into your rectum to feel your prostate. If they feel a lump or thickened area during this exam, they might do a prostate biopsy to help figure out the cause.  How do I prepare for prostate biopsy? -- Before your procedure, your doctor will do an exam and ask you about your \"health history.\" This involves asking you questions about any health problems you have or had in the past, past surgeries or biopsies, and any medicines you take. Tell them about:   Any medicines you are taking - This includes any prescription or \"over-the-counter\" medicines you use, plus any herbal supplements you take. It helps to write down and bring a list of any medicines you take, or bring a bag with all of your medicines with you.   Any allergies you have   Any bleeding problems you have - Certain medicines, including some herbs and supplements, can increase the risk of bleeding. Some health conditions also increase this risk. You might need to stop certain medicines for a time before your biopsy.  The doctor or nurse will tell you if you " "need to do anything special to prepare. In some cases, the doctor might prescribe a medicine to help you relax during the procedure.  Before the procedure, you might need to:   Use an enema or suppository to clean out your rectum.   Get a urine test to check for bacteria.   Take an antibiotic.  You will also get information about:   Eating and drinking before your procedure - In some cases, you might need to \"fast\" before the procedure. This means not eating or drinking anything for a period of time. In other cases, you might be allowed to have liquids until a short time before the procedure. Whether you need to fast, and for how long, depends on the procedure you are having.  Ask the doctor or nurse if you have questions or if there is anything you do not understand.  What happens during prostate biopsy? -- This depends on:   If you have had a prostate biopsy before   If the biopsy is done in a doctor's office or in the hospital   The type of biopsy   How many tissue samples the doctor takes  When it is time for the procedure:   You might get an \"IV,\" which is a thin tube that goes into a vein. This can be used to give you fluids and medicines.   You will get anesthesia medicines. This is to make sure that you do not feel pain during the procedure. Types of anesthesia include:   Local - This type of anesthesia uses medicine to numb a small part of your body so you don't feel pain.   Sedatives - These are medicines to make you relax and feel sleepy.   The doctors and nurses will have you lie on your side with your knees and hips bent for the procedure. They will monitor your breathing, blood pressure, and heart rate during the procedure.   The doctor might use an imaging test such as an ultrasound, MRI, or CT scan to take pictures of your prostate. The pictures will guide the doctor as they do the biopsy. Sometimes, the imaging test is done before the procedure. Other times, the doctor does the imaging test during " the procedure.   The doctor will take a small amount of tissue from the prostate. This can be done in 1 of 2 ways:   Transrectal biopsy - The doctor inserts a small ultrasound probe into the rectum. A needle goes through the probe and removes small pieces of tissue.   Transperineal biopsy - The doctor inserts the needle through the skin between the anus and the scrotum to take the tissue samples.   The procedure takes about 15 minutes.  What happens after prostate biopsy? -- After the procedure, the staff will watch you closely as your anesthesia wears off. Most of the time, you can go home a short time after your biopsy.  As you recover:   It is common to have a small amount of bright red blood from your rectum. You might also have a small amount of blood in your urine or semen.   You will get medicine to help with pain, if needed. You can take non-prescription medicines to relieve pain, such as acetaminophen (sample brand name: Tylenol). The doctor might recommend that you avoid ibuprofen (sample brand names: Advil, Motrin) and naproxen (sample brand name: Aleve) because they can increase your risk of bleeding.   Apply a cold gel pack, bag of ice, or bag of frozen vegetables to your perineum every 1 to 2 hours, for 15 minutes each time. Put a thin towel between the ice (or other cold object) and your skin. Some people find it helpful to use ice for the first 2 days after their biopsy.  You can go back to your normal activities after the procedure, including having sex. Some people are more comfortable if they avoid activities like riding a bike, motorcycle, or horse for about a week.  Your doctor or nurse will tell you when to expect your results.  What are the risks of prostate biopsy? -- Your doctor will talk to you about all of the possible risks, and answer your questions. Possible risks include:   Infection   Bleeding from your rectum, or blood in your urine or semen   Very bad pain   A tear in the wall of  the bladder or rectum   Trouble urinating   Trouble getting an erection  When should I call the doctor? -- Call for advice if:   You have a fever of 100.4°F (38°C) or higher, or chills.   You are not able to urinate, and your bladder feels full.   You have pain or burning when you urinate that lasts for more than a few days.   You have large blood clots (the size of a quarter or bigger) in your urine or start seeing more blood in your urine.   Your urine is thick or cloudy, or has a bad smell.   You have more bleeding from your rectum.   You have very bad pain in your belly that is not helped by pain relievers.  All topics are updated as new evidence becomes available and our peer review process is complete.  This topic retrieved from [employer] on: Mar 14, 2024.  Topic 477633 Version 1.0  Release: 32.2.4 - C32.72  © 2024 UpToDate, Inc. and/or its affiliates. All rights reserved.  figure 1: Prostate gland     This drawing shows male internal organs and a close-up of the prostate gland.  Graphic 42773 Version 5.0  Consumer Information Use and Disclaimer   Disclaimer: This generalized information is a limited summary of diagnosis, treatment, and/or medication information. It is not meant to be comprehensive and should be used as a tool to help the user understand and/or assess potential diagnostic and treatment options. It does NOT include all information about conditions, treatments, medications, side effects, or risks that may apply to a specific patient. It is not intended to be medical advice or a substitute for the medical advice, diagnosis, or treatment of a health care provider based on the health care provider's examination and assessment of a patient's specific and unique circumstances. Patients must speak with a health care provider for complete information about their health, medical questions, and treatment options, including any risks or benefits regarding use of medications. This information does not  endorse any treatments or medications as safe, effective, or approved for treating a specific patient. UpToDate, Inc. and its affiliates disclaim any warranty or liability relating to this information or the use thereof.The use of this information is governed by the Terms of Use, available at https://www.[employer].com/en/know/clinical-effectiveness-terms. 2024© UpToDate, Inc. and its affiliates and/or licensors. All rights reserved.  Copyright   © 2024 UpToDate, Inc. and/or its affiliates. All rights reserved.

## 2024-12-30 NOTE — LETTER
December 30, 2024     Joshua Melendez MD  [address] [facility] [address]    Patient: Juan Luis Muñoz   YOB: 1960   Date of Visit: 12/30/2024       Dear Dr. Melendez:    Thank you for referring Juan Luis Muñoz to me for evaluation. Below are my notes for this consultation.    If you have questions, please do not hesitate to call me. I look forward to following your patient along with you.         Sincerely,        Andre Everett MD        CC: MD Andre Cueto MD  12/30/2024  2:07 PM  Sign when Signing Visit      Biopsy prostate     Date/Time  12/30/2024 1:30 PM     Performed by  Andre Everett MD   Authorized by  Andre Everett MD     [place] Protocol   Consent: Verbal consent obtained. Written consent obtained.  Risks and benefits: risks, benefits and alternatives were discussed  Consent given by: patient  Patient understanding: patient states understanding of the procedure being performed  Patient consent: the patient's understanding of the procedure matches consent given  Procedure consent: procedure consent matches procedure scheduled  Patient identity confirmed: verbally with patient      Local anesthesia used: yes      Anesthesia: local infiltration and nerve block     Anesthesia   Local anesthesia used: yes  Local Anesthetic: lidocaine 2% without epinephrine  Anesthetic total: 10 mL     Sedation   Patient sedated: no        Specimen: yes    Culture: no   Procedure Details   Procedure Notes:   The patient was brought to the procedure room and properly identified.  Rocephin 1gm was given intramuscularly as ordered.    The patient was then placed in the left lateral position.  Digital rectal examination was performed. MIGUEL ANGEL reveals a small benign feeling prostate.  Betadine was instilled into the rectum.  The transrectal ultrasound probe was placed in the rectum.  Transrectal ultrasound of prostate then commenced.      Findings on ultrasound:   The prostate  measures 3.86 cm in length, 3.54 cm in height and 4.94 cm in width.  Volume is estimated at 35 g.  There are no hypoechoic lesions in the peripheral zone.  There are dense calcifications at the border of the peripheral zone and transition zone.  No significant median lobe.  The seminal vesicles are normal.  The visualized bladder is unremarkable.      After completion  of the ultrasound a prostate block was administered in standard fashion using 2% xylocaine without epinephrine and a spinal needle.    Transrectal ultrasound-guided biopsies were then obtained.  12 biopsies were obtained with standard template.  Biopsies were directed medially and laterally on both sides of the gland at the base, mid gland and apex.    No roel prostatic hematoma was noted.    The procedure was well tolerated.  The patient was given discharge instructions.  He left the procedure room in satisfactory condition.    Patient Transportation: confirmed  Patient tolerance: patient tolerated the procedure well with no immediate complications

## 2025-01-02 PROCEDURE — 88342 IMHCHEM/IMCYTCHM 1ST ANTB: CPT | Performed by: PATHOLOGY

## 2025-01-02 PROCEDURE — G0416 PROSTATE BIOPSY, ANY MTHD: HCPCS | Performed by: PATHOLOGY

## 2025-01-02 PROCEDURE — 88341 IMHCHEM/IMCYTCHM EA ADD ANTB: CPT | Performed by: PATHOLOGY

## 2025-01-02 PROCEDURE — 88344 IMHCHEM/IMCYTCHM EA MLT ANTB: CPT | Performed by: PATHOLOGY

## 2025-01-08 ENCOUNTER — TRANSCRIBE ORDERS (OUTPATIENT)
Dept: GASTROENTEROLOGY | Facility: CLINIC | Age: 65
End: 2025-01-08

## 2025-01-09 ENCOUNTER — TELEMEDICINE (OUTPATIENT)
Dept: FAMILY MEDICINE CLINIC | Facility: CLINIC | Age: 65
End: 2025-01-09
Payer: COMMERCIAL

## 2025-01-09 DIAGNOSIS — U07.1 COVID-19: Primary | ICD-10-CM

## 2025-01-09 PROCEDURE — 99213 OFFICE O/P EST LOW 20 MIN: CPT

## 2025-01-09 RX ORDER — MOLNUPIRAVIR 200 MG/1
800 CAPSULE ORAL EVERY 12 HOURS
Qty: 40 CAPSULE | Refills: 0 | Status: SHIPPED | OUTPATIENT
Start: 2025-01-09 | End: 2025-01-14

## 2025-01-09 NOTE — PROGRESS NOTES
Virtual Regular Visit  Name: Juan Luis Muñoz      : 1960      MRN: [id number]  Encounter Provider: Amauri Trent DO  Encounter Date: 2025   Encounter department: [facility]      Verification of patient location:  Patient is located at Home in the following state in which I hold an active license [place] :  Assessment & Plan  COVID-19  Symptoms began 2020  Tested positive at home 2025  Paxlovid not recommended while on Xarelto.  Lagevrio as below, side effects reviewed  Continue supportive care with warm fluids, honey, cough drops, etc.  ED/return precautions for any worsening symptoms  Orders:    molnupiravir (Lagevrio) 200 mg capsule; Take 4 capsules (800 mg total) by mouth every 12 (twelve) hours for 5 days        Encounter provider Amauri Trent DO    The patient was identified by name and date of birth. Juan Luis Muñoz was informed that this is a telemedicine visit and that the visit is being conducted through the Epic Embedded platform. He agrees to proceed..  My office door was closed. No one else was in the room.  He acknowledged consent and understanding of privacy and security of the video platform. The patient has agreed to participate and understands they can discontinue the visit at any time.    Patient is aware this is a billable service.     History of Present Illness     HPI    Patient begins with myalgias, cough, sinus pain on , tested positive for COVID .  Interested in antiviral medication for COVID.  Taking Tylenol 1 tablet every 4-6 hours as needed with some relief of myalgias.    Review of Systems   Constitutional:  Positive for fatigue.   HENT:  Positive for congestion.    Respiratory:  Positive for cough. Negative for shortness of breath.    Cardiovascular:  Negative for chest pain and palpitations.   Gastrointestinal:  Negative for abdominal pain.   Musculoskeletal:  Positive for myalgias.       Objective   There were no vitals taken for this  visit.    Physical Exam  Vitals reviewed.   HENT:      Head: Normocephalic and atraumatic.   Pulmonary:      Effort: Pulmonary effort is normal.   Neurological:      Mental Status: He is alert.   Psychiatric:         Mood and Affect: Mood normal.         Behavior: Behavior normal.         Visit Time  Total Visit Duration: 15

## 2025-01-16 ENCOUNTER — TELEPHONE (OUTPATIENT)
Age: 65
End: 2025-01-16

## 2025-01-20 LAB
A1AT SERPL-MCNC: 173 MG/DL (ref 83–199)
ALBUMIN SERPL-MCNC: 4.4 G/DL (ref 3.6–5.1)
ALBUMIN/GLOB SERPL: 1.9 (CALC) (ref 1–2.5)
ALP SERPL-CCNC: 60 U/L (ref 35–144)
ALT SERPL-CCNC: 18 U/L (ref 9–46)
ANA SER QL: NEGATIVE
AST SERPL-CCNC: 21 U/L (ref 10–35)
BILIRUB SERPL-MCNC: 1 MG/DL (ref 0.2–1.2)
BUN SERPL-MCNC: 12 MG/DL (ref 7–25)
BUN/CREAT SERPL: NORMAL (CALC) (ref 6–22)
CALCIUM SERPL-MCNC: 9.6 MG/DL (ref 8.6–10.3)
CERULOPLASMIN SERPL-MCNC: 26 MG/DL (ref 14–30)
CHLORIDE SERPL-SCNC: 106 MMOL/L (ref 98–110)
CO2 SERPL-SCNC: 30 MMOL/L (ref 20–32)
CREAT SERPL-MCNC: 0.94 MG/DL (ref 0.7–1.35)
ERYTHROCYTE [DISTWIDTH] IN BLOOD BY AUTOMATED COUNT: 11.7 % (ref 11–15)
GFR/BSA.PRED SERPLBLD CYS-BASED-ARV: 92 ML/MIN/1.73M2
GLOBULIN SER CALC-MCNC: 2.3 G/DL (CALC) (ref 1.9–3.7)
GLUCOSE SERPL-MCNC: 92 MG/DL (ref 65–99)
HCT VFR BLD AUTO: 47.7 % (ref 38.5–50)
HGB BLD-MCNC: 16.9 G/DL (ref 13.2–17.1)
INR PPP: 1.2
MCH RBC QN AUTO: 34.2 PG (ref 27–33)
MCHC RBC AUTO-ENTMCNC: 35.4 G/DL (ref 32–36)
MCV RBC AUTO: 96.6 FL (ref 80–100)
MITOCHONDRIA AB SER QL IF: NEGATIVE
PLATELET # BLD AUTO: 186 THOUSAND/UL (ref 140–400)
PMV BLD REES-ECKER: 10.5 FL (ref 7.5–12.5)
POTASSIUM SERPL-SCNC: 4.3 MMOL/L (ref 3.5–5.3)
PROT SERPL-MCNC: 6.7 G/DL (ref 6.1–8.1)
PROTHROMBIN TIME: 13.1 SEC (ref 9–11.5)
RBC # BLD AUTO: 4.94 MILLION/UL (ref 4.2–5.8)
SMOOTH MUSCLE AB SER QL IF: NEGATIVE
SODIUM SERPL-SCNC: 143 MMOL/L (ref 135–146)
WBC # BLD AUTO: 7.6 THOUSAND/UL (ref 3.8–10.8)

## 2025-01-20 NOTE — PROGRESS NOTES
Name: Juan Luis Muñoz      : 1960      MRN: [id number]  Encounter Provider: Feroz Brooks MD  Encounter Date: 2025   Encounter department: [facility]  :  Assessment & Plan  Elevated PSA  Elevated PSA, now status post biopsy, at the right apex there are some abnormal cells concerning for potentially well-differentiated adenocarcinoma without meeting definitive definition for prostate cancer.  I spoke with Juan Luis and told him that I typically follow patients such as this with an active surveillance protocol with a visit in 6 months for PSA prior and a digital rectal exam.  At that time I will order a multiparametric prostate MRI.  If this is positive for a target lesion his confirmatory biopsy to be performed in 12 months from now will be by way of an MRI targeted approach.  If the MRI is negative, we can perform a systematic prostate biopsy for confirmatory biopsy purposes.    Questions and concerns answered and addressed.  Follow-up in 6 months  Orders:    PSA Total, Diagnostic; Future    Encounter to discuss test results               History of Present Illness   Juan Luis Muñoz is a 64 y.o. male who presents s/p prostate biopsy  Negative except at right apex, concern for well differentiated adenocarcinoma not meeting criteria for a Wells score    The following portions of the patient's history were reviewed and updated as appropriate: allergies, current medications, past family history, past medical history, past social history, past surgical history and problem list.      Review of Systems   Constitutional: Negative.    HENT: Negative.     Eyes: Negative.    Respiratory: Negative.     Cardiovascular: Negative.    Gastrointestinal: Negative.    Endocrine: Negative.    Genitourinary: Negative.    Musculoskeletal: Negative.    Skin: Negative.    Allergic/Immunologic: Negative.    Neurological: Negative.    Hematological: Negative.    Psychiatric/Behavioral: Negative.             Objective   There were no vitals taken for this visit.    Physical Exam  Vitals reviewed.   Constitutional:       General: He is not in acute distress.     Appearance: Normal appearance. He is well-developed. He is not ill-appearing, toxic-appearing or diaphoretic.   HENT:      Head: Normocephalic and atraumatic.      Nose: Nose normal.      Mouth/Throat:      Mouth: Mucous membranes are moist.   Eyes:      General: No scleral icterus.        Right eye: No discharge.         Left eye: No discharge.   Neck:      Thyroid: No thyromegaly.      Trachea: No tracheal deviation.   Pulmonary:      Effort: Pulmonary effort is normal.   Chest:      Chest wall: No tenderness.   Abdominal:      General: There is no distension.      Palpations: There is no mass.      Tenderness: There is no abdominal tenderness. There is no guarding.      Hernia: No hernia is present.   Musculoskeletal:         General: No deformity or signs of injury. Normal range of motion.      Cervical back: Normal range of motion and neck supple.   Lymphadenopathy:      Cervical: No cervical adenopathy.   Skin:     General: Skin is dry.      Coloration: Skin is not jaundiced or pale.      Findings: No erythema.   Neurological:      Mental Status: He is alert and oriented to person, place, and time.      Cranial Nerves: No cranial nerve deficit.      Motor: No abnormal muscle tone.      Coordination: Coordination normal.   Psychiatric:         Mood and Affect: Mood normal.         Behavior: Behavior normal.         Thought Content: Thought content normal.         Judgment: Judgment normal.          Results  Lab Results   Component Value Date    PSA 7.9 (H) 08/02/2024    PSA 8.42 (H) 07/05/2024     Lab Results   Component Value Date    GLUCOSE 93 11/01/2015    CALCIUM 9.6 07/05/2024     12/01/2017    K 4.1 07/05/2024    CO2 26 07/05/2024     07/05/2024    BUN 12 07/05/2024    CREATININE 0.87 07/05/2024     Lab Results   Component Value Date     WBC 12.6 (H) 07/05/2024    HGB 18.3 (H) 07/05/2024    HCT 50.6 (H) 07/05/2024    MCV 97.7 07/05/2024     07/05/2024     Final Diagnosis   A.  Left lateral base, prostate (biopsy):     - Benign prostatic tissue.     B.  Left base, prostate (biopsy):     - Benign prostatic tissue.     C.  Left lateral mid, prostate (biopsy):     - Benign prostatic tissue.     D.  Left mid, prostate (biopsy):     - Benign prostatic tissue.     E.  Left lateral apex, prostate (biopsy):     - Benign prostatic tissue.     F.  Left apex, prostate (biopsy):     - Benign prostatic tissue.     G.  Right lateral base, prostate (biopsy):     - Benign prostatic tissue.     H.  Right base, prostate (biopsy):     - Benign prostatic tissue.     I.  Right lateral mid, prostate (biopsy):     - Benign prostatic tissue.     J.  Right mid, prostate (biopsy):     - Benign prostatic tissue.     K.  Right lateral apex, prostate (biopsy):     - Benign prostatic tissue.     L.  Right apex, prostate (biopsy):     - Prostate tissue with small atypical focus, suspicious for well-differentiated adenocarcinoma.   Electronically signed by Elroy Kaplan MD on 1/2/2025 at 1031 EST       Office Urine Dip  No results found for this or any previous visit (from the past hour).]

## 2025-01-20 NOTE — PATIENT INSTRUCTIONS
PROSTATE CANCER SCREENING OVERVIEW    Prostate cancer screening involves testing for prostate cancer in men who have no symptoms of the disease. This testing can find cancer at an early stage. However, medical experts agree that prostate cancer screening should not be routinely ordered for all men and that screening can lead to both benefits and harms.    This article is designed to review the advantages and disadvantages of prostate cancer screening. You should talk with your health care provider to decide what is best in your individual situation.    WHAT IS PROSTATE CANCER?  Prostate cancer is a cancer of the prostate, a small gland in men that is located below the bladder and in front of the rectum (figure 1). The prostate produces fluid that helps carry sperm during ejaculation.  Although many men are diagnosed with prostate cancer, most of them do not die from their cancer. Prostate cancer often grows so slowly that many men die of other causes before they even develop symptoms of prostate cancer.    PROSTATE CANCER RISK FACTORS  Age -- All men are at risk for prostate cancer, but the risk greatly increases with older age. Prostate cancer is rarely found in men younger than 50 years old.    Ethnic background --  men develop prostate cancer more often than white and  men.  men also are more likely to die of prostate cancer than white or  men.    Family medical history -- Men who have a first-degree relative (a father or brother) with prostate cancer are more likely to develop the disease. Men with female relatives with breast cancer related to the breast cancer gene (BRCA) may also be more likely to develop prostate cancer.    Diet -- A diet high in animal fat or low in vegetables may increase a man's risk of prostate cancer.    PROSTATE CANCER SCREENING TESTS  Prostate cancer screening involves blood test that measures prostate-specific antigen  "(PSA).  Prostate-specific antigen (PSA) -- PSA is a protein produced by the prostate. The PSA test measures the amount of PSA in a sample of blood. Although many men with prostate cancer have an elevated PSA concentration, a high level does not necessarily mean there is a cancer.  The most common cause for an elevated PSA is benign prostatic hyperplasia (BPH), a noncancerous enlargement of the prostate. Other causes include prostate infection (prostatitis), trauma (bicycle riding), and sexual activity. You should avoid ejaculating or riding a bike for at least 48 hours before having a PSA test. (See \"Patient education: Benign prostatic hyperplasia (BPH) (Beyond the Basics)\".)    Rectal examination -- A rectal examination is sometimes recommended, along with measurement of the PSA, to screen for prostate cancer. However, studies have not shown that rectal examination is an effective screening test for prostate cancer when used alone.    If the PSA test is positive -- A positive PSA test is not a reason to panic; noncancerous conditions are the most common causes for an abnormal test, particularly for PSA tests. On the other hand, a positive test should not be ignored.    The first step in evaluating an elevated PSA is usually to repeat the test.  You should avoid ejaculating and riding a bike for at least 48 hours before repeating the test. If the PSA remains elevated, a prostate biopsy or other testing is usually recommended.    Prostate biopsy -- A prostate biopsy involves having a rectal ultrasound and use of a needle to obtain tissue samples from the prostate gland. The biopsy is usually performed in the office by a urologist (a doctor who specializes in treatment of urinary, bladder, and prostate issues). After the procedure, most men feel sore and you may see some blood in the urine or semen, this can last for up to 4-6 weeks. Biopsies can rarely cause serious infections. Sometimes biopsies are guided by " "magnetic resonance imaging (MRI).    PROS AND CONS OF PROSTATE CANCER SCREENING    There are a number of arguments for and against prostate cancer screening.    Arguments for screening -- Experts in favor of prostate cancer screening cite the following arguments:    ?Results from a large  study of prostate cancer screening found that men who had prostate-specific antigen (PSA) testing had a 20 percent lower chance of dying from prostate cancer after 13 years compared with men who did not have prostate cancer screening [1]. Men who had PSA testing also had a 30 percent lower chance of developing metastatic disease (cancer that has spread to other parts of the body) [2].  In another  study of prostate cancer screening, the mortality benefit was even larger to prostate cancer screening  (the [place] trial)    ?A substantial number of men die from prostate cancer every year and many more suffer from the complications of advanced disease.    ?For men with an aggressive prostate cancer, the best chance for curing it is by finding it at an early stage and then treating it with surgery or radiation. Studies have shown that men who have prostate cancer detected by PSA screening tend to have earlier-stage cancer than men who have a cancer detected by other means. (See \"Patient education: Treatment for advanced prostate cancer (Beyond the Basics)\" and \"Patient education: Prostate cancer treatment; stage I to III cancer (Beyond the Basics)\".)    ?The five-year survival for men who have prostate cancer confined to the prostate gland (early stage) is nearly 100 percent; this drops to 30 percent for men whose cancer has spread to other areas of the body. However, many early-stage cancers are not aggressive, and the five-year survival for those will be nearly 100 percent even without any treatment [3].    ?The available screening tests are not perfect, but they are easy to perform and have fair accuracy.  Arguments " against screening -- Other arguments have also been made against screening:    ?Even though the  study found a benefit of prostate cancer screening, PSA testing prevented only about one prostate cancer death for every 1000 screened men after 13 years [1]. Furthermore, 75 percent of men with an abnormal PSA who had a prostate biopsy did not have prostate cancer.  However, in the [facility] study, the number needed to screen and treat was much lower indicating that prostate cancer screening is ineffective screening measure which decreases the morbidity and mortality of prostate cancer.    ?Many prostate cancers detected with screening are unlikely to cause death or disability. Thus, a number of men will be diagnosed with cancer and potentially suffer the side effects of cancer treatment for cancers that never would have been found without prostate cancer screening. In other words, even if screening finds a cancer early, it is not clear in all cases that the cancer must be treated.    IS PROSTATE CANCER SCREENING RIGHT FOR ME?    The answer to this question is not the same for everyone. The table includes some questions you can ask yourself when weighing the potential risk and benefits of screening (table 1).  Professional organizations -- Major medical associations and societies, including the US Preventive Services Task Force [4], American Cancer Society [5], American Urological Association [6], and many  cancer societies, agree that men should discuss screening with their health care providers. Men should be informed about the benefits and risks of prostate cancer screening and treatment and make decisions that best reflect their personal values and preferences.  Age to first consider screening -- Screening discussions should begin at age 50 years for men at average risk for developing prostate cancer. Men with risk factors for prostate cancer (such as black men or men with a father or brother who had  prostate cancer) may want to begin screening discussions at age 40 to 45 years.  How often to perform screening -- Once screening begins, it should occur every two to four years (if continued testing is desired) and should include a PSA blood test.  Age to stop screening -- Guidelines suggest stopping screening after age 69, though some experts would continue offering screening to very healthy men beyond that age.  Screening not recommended -- Screening is generally not recommended for men whose life expectancy, or ability to undergo curative treatment, is limited by serious health problems. In these situations, the potential benefits of screening are outweighed by the likely harms.  WHERE TO GET MORE INFORMATION  Your healthcare provider is the best source of information for questions and concerns related to your medical problem.  This article will be updated as needed on our web site ([email or web address]). Related topics for patients, as well as selected articles written for healthcare professionals, are also available. Some of the most relevant are listed below.  Patient level information -- [employer] offers two types of patient education materials.  The Basics -- The Basics patient education pieces answer the four or five key questions a patient might have about a given condition. These articles are best for patients who want a general overview and who prefer short, easy-to-read materials.  Patient education: Prostate cancer screening (PSA tests) (The Basics)  Patient education: Prostate cancer (The Basics)  Patient education: Cancer screening (The Basics)  Patient education: Choosing treatment for low-risk localized prostate cancer (The Basics)  Beyond the Basics -- Beyond the Basics patient education pieces are longer, more sophisticated, and more detailed. These articles are best for patients who want in-depth information and are comfortable with some medical jargon.  Patient education: Treatment for  advanced prostate cancer (Beyond the Basics)  Patient education: Prostate cancer treatment; stage I to III cancer (Beyond the Basics)  Patient education: Benign prostatic hyperplasia (BPH) (Beyond the Basics)  Professional level information -- Professional level articles are designed to keep doctors and other health professionals up-to-date on the latest medical findings. These articles are thorough, long, and complex, and they contain multiple references to the research on which they are based. Professional level articles are best for people who are comfortable with a lot of medical terminology and who want to read the same materials their doctors are reading.  Measurement of prostate-specific antigen  Screening for prostate cancer  The following organizations also provide reliable health information.  ?National Cancer [place]  8-139-8-CANCER  (www.cancer.gov/types/prostate)  ?American Society for Clinical Oncology (patient information website)  (www.cancer.net)  ?National Comprehensive Cancer Network (patient and caregiver information website)  (www.nccn.org/patients)  ?American Cancer Society  6-321-RPU-2345  (www.cancer.org)  ?National Library of Medicine  (www.medlineplus.gov/healthtopics.html)  ?[employer] Prostate Cancer Education and Support  (www.ustoo.org/Detection-PSA-And-MIGUEL ANGEL)

## 2025-01-22 ENCOUNTER — RESULTS FOLLOW-UP (OUTPATIENT)
Age: 65
End: 2025-01-22

## 2025-01-22 ENCOUNTER — HOSPITAL ENCOUNTER (OUTPATIENT)
Dept: MRI IMAGING | Facility: HOSPITAL | Age: 65
Discharge: HOME | End: 2025-01-22
Attending: INTERNAL MEDICINE
Payer: COMMERCIAL

## 2025-01-22 DIAGNOSIS — R74.8 ELEVATED LIVER ENZYMES: ICD-10-CM

## 2025-01-22 PROCEDURE — A9585 GADOBUTROL INJECTION: HCPCS | Performed by: INTERNAL MEDICINE

## 2025-01-22 PROCEDURE — 74183 MRI ABD W/O CNTR FLWD CNTR: CPT

## 2025-01-22 RX ORDER — GADOBUTROL 604.72 MG/ML
8 INJECTION INTRAVENOUS
Status: COMPLETED | OUTPATIENT
Start: 2025-01-22 | End: 2025-01-22

## 2025-01-22 RX ADMIN — GADOBUTROL 8 ML: 604.72 INJECTION INTRAVENOUS at 15:02

## 2025-01-27 ENCOUNTER — OFFICE VISIT (OUTPATIENT)
Dept: UROLOGY | Facility: CLINIC | Age: 65
End: 2025-01-27
Payer: COMMERCIAL

## 2025-01-27 VITALS
HEIGHT: 69 IN | SYSTOLIC BLOOD PRESSURE: 116 MMHG | WEIGHT: 195 LBS | BODY MASS INDEX: 28.88 KG/M2 | OXYGEN SATURATION: 96 % | DIASTOLIC BLOOD PRESSURE: 70 MMHG | HEART RATE: 58 BPM

## 2025-01-27 DIAGNOSIS — R97.20 ELEVATED PSA: Primary | ICD-10-CM

## 2025-01-27 DIAGNOSIS — Z71.2 ENCOUNTER TO DISCUSS TEST RESULTS: ICD-10-CM

## 2025-01-27 PROCEDURE — 99213 OFFICE O/P EST LOW 20 MIN: CPT | Performed by: UROLOGY

## 2025-01-27 NOTE — ASSESSMENT & PLAN NOTE
Elevated PSA, now status post biopsy, at the right apex there are some abnormal cells concerning for potentially well-differentiated adenocarcinoma without meeting definitive definition for prostate cancer.  I spoke with Juan Luis and told him that I typically follow patients such as this with an active surveillance protocol with a visit in 6 months for PSA prior and a digital rectal exam.  At that time I will order a multiparametric prostate MRI.  If this is positive for a target lesion his confirmatory biopsy to be performed in 12 months from now will be by way of an MRI targeted approach.  If the MRI is negative, we can perform a systematic prostate biopsy for confirmatory biopsy purposes.    Questions and concerns answered and addressed.  Follow-up in 6 months  Orders:    PSA Total, Diagnostic; Future

## 2025-02-24 ENCOUNTER — TELEPHONE (OUTPATIENT)
Dept: GASTROENTEROLOGY | Facility: CLINIC | Age: 65
End: 2025-02-24

## 2025-02-24 NOTE — TELEPHONE ENCOUNTER
Dr Melendez~    Our mutual patient is scheduled for procedure: EGD/Colonoscopy at [facility]. Please advise if pt is cleared to hold the Xarelto 2 days prior to the procedures. Thank you so much!     On: __3__/_  18  _/_ 25   _      With:  __Francisco_______    He/She is taking the following blood thinner:    Xarelto      Can this be stopped _2_____ days prior to the procedure?      Physician Approving clearance: ________________________

## 2025-03-03 DIAGNOSIS — M1A.0710 CHRONIC IDIOPATHIC GOUT INVOLVING TOE OF RIGHT FOOT WITHOUT TOPHUS: ICD-10-CM

## 2025-03-03 DIAGNOSIS — I10 PRIMARY HYPERTENSION: ICD-10-CM

## 2025-03-03 DIAGNOSIS — D68.51 FACTOR V LEIDEN MUTATION (HCC): ICD-10-CM

## 2025-03-03 NOTE — TELEPHONE ENCOUNTER
Reason for call:   [x] Refill   [] Prior Auth  [] Other:     Office:   [x] PCP/Provider - Dr Melendez  [] Specialty/Provider -     Medication: metoprolol succinate Xl 50 mg take twice daily     Lisinopril 20 mg take daily     Allopurinol 300 mg take daily     Xarelto 20 mg take daily     Quantity: 90 day for all     Pharmacy: [facility]     Does the patient have enough for 3 days?   [x] Yes   [] No - Send as HP to POD

## 2025-03-04 ENCOUNTER — ANESTHESIA (OUTPATIENT)
Dept: ANESTHESIOLOGY | Facility: HOSPITAL | Age: 65
End: 2025-03-04

## 2025-03-04 ENCOUNTER — ANESTHESIA EVENT (OUTPATIENT)
Dept: ANESTHESIOLOGY | Facility: HOSPITAL | Age: 65
End: 2025-03-04

## 2025-03-04 RX ORDER — ALLOPURINOL 300 MG/1
300 TABLET ORAL DAILY
Qty: 90 TABLET | Refills: 1 | Status: SHIPPED | OUTPATIENT
Start: 2025-03-04

## 2025-03-04 RX ORDER — LISINOPRIL 20 MG/1
20 TABLET ORAL DAILY
Qty: 90 TABLET | Refills: 1 | Status: SHIPPED | OUTPATIENT
Start: 2025-03-04

## 2025-03-04 RX ORDER — METOPROLOL SUCCINATE 50 MG/1
50 TABLET, EXTENDED RELEASE ORAL 2 TIMES DAILY
Qty: 180 TABLET | Refills: 1 | Status: SHIPPED | OUTPATIENT
Start: 2025-03-04

## 2025-03-05 ENCOUNTER — TELEPHONE (OUTPATIENT)
Age: 65
End: 2025-03-05

## 2025-03-05 NOTE — TELEPHONE ENCOUNTER
Pt called to get the prep instructions again so sent out maryan/dul prep, diabetic instructions, screening vs diagnostic sent via my chart advised to check in communications under letters

## 2025-03-05 NOTE — LETTER
COLONOSCOPY  MIRALAX/Dulcolax Bowel Preparation Instructions    The OR/GI Lab will contact you the evening prior to your procedure with your exact arrival time.    Our practice requires a 1 week notice for any cancellations or rescheduling. We kindly ask that you immediately notify us of any changes including any new medications that are prescribed. Thank you for your cooperation.     WEEK BEFORE YOUR PROCEDURE:  Stop taking Iron tablets.  5 days prior, AVOID vegetables and fruits with skins or seeds, nuts, corn, popcorn and whole grain breads.   Purchase: One (1) 238-gram container of Miralax (polyethylene glycol 3350), four (4) 5 mg Dulcolax (bisacodyl) tablets, and one (1) 64-ounce bottle of Gatorade (sports drink) - no red, orange, or purple. These may be purchased at any pharmacy without a prescription. Generic products are permissible.   Arrange responsible transportation for day of the procedure.     DAY BEFORE THE PROCEDURE:   CLEAR liquids only for entire day prior. Nothing red, orange or purple.    You MAY have:                                                               Soda  Water  Broth Gatorade  Jello  Popsicles Coffee/tea without milk/creamer     YOU MAY NOT HAVE:  Solid foods   Milk and milk products    Juice with pulp    BOWEL PREPARATION:  Includes: One (1) 238-gram container of Miralax (polyethylene glycol 3350), four (4) 5 mg Dulcolax (bisacodyl) tablets, and one (1) 64-ounce bottle of Gatorade (sports drink).  Preparation may be refrigerated.  Entire bowel prep should be completed.     Afternoon before the procedure (2:00 pm - 5:00 pm):    Take two (2) 5 mg Dulcolax laxative tablets.     Evening before the procedure (6:00 pm):  Mix entire container of Miralax with one (1) 64-ounce bottle of Gatorade and shake until all medication is dissolved.   Begin drinking solution. Drink an eight (8) ounce cup every 10-15 minutes until you have consumed half (32 ounces) of the solution.  Refrigerate  remaining solution.    Night before the procedure (8:00 pm):  Take two (2) 5 mg Dulcolax laxative tablets.     Beginning 5 hours before your procedure:  Drink the remaining amount of prepared solution (32 ounces).  Drink an eight (8) ounce cup every 10-15 minutes until you have consumed the remaining solution.     Bowel prep should be completed 4 hours prior to procedure time.    NOTHING TO EAT OR DRINK AFTER MIDNIGHT- EXCEPT FOR YOUR PREP    DAY OF THE PROCEDURE:  You may brush your teeth.  Leave all jewelry at home.  Please arrive for your procedure as indicated by the OR / GI Lab / Endoscopy Unit. The hospital will contact you the day before with your exact arrival time.   Make sure you have arranged ahead of time for a responsible adult (18 or older) to accompany and drive you home after the procedure.  Please discuss any transportation concerns with our staff prior to your procedure.    The effects of the anesthesia can persist for 24 hours.  After receiving the sedation, you must exercise caution before engaging in any activity that could harm yourself and others (such as driving a car).  Do not make any important decisions or do not drink any alcoholic beverages during this time period.  After your procedure, you may have anything you'd like to eat or drink.  You will probably want to start with something light.  Please include plenty of fluids.  Avoid items that cause gas such as sodas and salads.    SPECIAL INSTRUCTIONS:    For patients currently taking blood thinners and/or antiplatelet therapy our office will contact the prescribing provider.  Our office will contact you with any required changes to your medication regimen.     Blood thinner (i.e. - Coumadin, Pradaxa, Lovenox, Xarelto, Eliquis)  ?  Continue (Do Not Stop)  ? Stop______________for_____________days prior to the procedure.    Antiplatelet (i.e. - Plavix, Aggrenox, Effient, Brilinta)  ?  Continue (Do Not  Stop)  ? Stop______________for_____________days prior to the procedure.       Diabetes:   If you are Diabetic, please see separate Diabetic Instruction Sheet.          Prescribed medications:  Do not stop your aspirin, or any of your other medications (unless instructed otherwise).    Take the rest of your prescribed medications with small sips of water at least 2 hours prior to your procedure.      For any questions or concerns related to your bowel preparation or pre-procedure instructions, please contact our office at [phone].  Thank you for choosing [facility]!

## 2025-03-18 ENCOUNTER — HOSPITAL ENCOUNTER (OUTPATIENT)
Dept: GASTROENTEROLOGY | Facility: AMBULARY SURGERY CENTER | Age: 65
Setting detail: OUTPATIENT SURGERY
Discharge: HOME | End: 2025-03-18
Attending: INTERNAL MEDICINE
Payer: COMMERCIAL

## 2025-03-18 ENCOUNTER — ANESTHESIA (OUTPATIENT)
Dept: GASTROENTEROLOGY | Facility: AMBULARY SURGERY CENTER | Age: 65
End: 2025-03-18
Payer: COMMERCIAL

## 2025-03-18 VITALS
HEART RATE: 74 BPM | SYSTOLIC BLOOD PRESSURE: 118 MMHG | BODY MASS INDEX: 27.7 KG/M2 | OXYGEN SATURATION: 98 % | DIASTOLIC BLOOD PRESSURE: 81 MMHG | WEIGHT: 187 LBS | TEMPERATURE: 97.2 F | HEIGHT: 69 IN | RESPIRATION RATE: 16 BRPM

## 2025-03-18 DIAGNOSIS — K57.92 DIVERTICULITIS: ICD-10-CM

## 2025-03-18 DIAGNOSIS — R14.0 POSTPRANDIAL ABDOMINAL BLOATING: ICD-10-CM

## 2025-03-18 DIAGNOSIS — K21.9 GASTROESOPHAGEAL REFLUX DISEASE, UNSPECIFIED WHETHER ESOPHAGITIS PRESENT: ICD-10-CM

## 2025-03-18 DIAGNOSIS — K22.2 SCHATZKI'S RING: ICD-10-CM

## 2025-03-18 DIAGNOSIS — Z86.0100 HISTORY OF COLON POLYPS: ICD-10-CM

## 2025-03-18 PROCEDURE — C1726 CATH, BAL DIL, NON-VASCULAR: HCPCS

## 2025-03-18 PROCEDURE — 88305 TISSUE EXAM BY PATHOLOGIST: CPT | Performed by: PATHOLOGY

## 2025-03-18 RX ORDER — PROPOFOL 10 MG/ML
INJECTION, EMULSION INTRAVENOUS AS NEEDED
Status: DISCONTINUED | OUTPATIENT
Start: 2025-03-18 | End: 2025-03-18

## 2025-03-18 RX ORDER — OMEPRAZOLE 40 MG/1
40 CAPSULE, DELAYED RELEASE ORAL DAILY
Qty: 90 CAPSULE | Refills: 2 | Status: SHIPPED | OUTPATIENT
Start: 2025-03-18 | End: 2025-06-16

## 2025-03-18 RX ORDER — PROPOFOL 10 MG/ML
INJECTION, EMULSION INTRAVENOUS CONTINUOUS PRN
Status: DISCONTINUED | OUTPATIENT
Start: 2025-03-18 | End: 2025-03-18

## 2025-03-18 RX ORDER — SODIUM CHLORIDE, SODIUM LACTATE, POTASSIUM CHLORIDE, CALCIUM CHLORIDE 600; 310; 30; 20 MG/100ML; MG/100ML; MG/100ML; MG/100ML
INJECTION, SOLUTION INTRAVENOUS CONTINUOUS PRN
Status: DISCONTINUED | OUTPATIENT
Start: 2025-03-18 | End: 2025-03-18

## 2025-03-18 RX ADMIN — PROPOFOL 100 MCG/KG/MIN: 10 INJECTION, EMULSION INTRAVENOUS at 13:04

## 2025-03-18 RX ADMIN — PROPOFOL 100 MG: 10 INJECTION, EMULSION INTRAVENOUS at 13:04

## 2025-03-18 RX ADMIN — SODIUM CHLORIDE, SODIUM LACTATE, POTASSIUM CHLORIDE, AND CALCIUM CHLORIDE: .6; .31; .03; .02 INJECTION, SOLUTION INTRAVENOUS at 12:46

## 2025-03-18 NOTE — ANESTHESIA PREPROCEDURE EVALUATION
Procedure:  COLONOSCOPY  EGD    Relevant Problems   CARDIO   (+) DVT (deep venous thrombosis) (HCC)   (+) Hypertriglyceridemia   (+) Primary hypertension      GI/HEPATIC   (+) Gastroesophageal reflux disease   (+) Hepatic steatosis      HEMATOLOGY   (+) Thrombocytopenia (HCC)      MUSCULOSKELETAL   (+) Chronic idiopathic gout involving toe of right foot without tophus        Physical Exam    Airway    Mallampati score: II  TM Distance: >3 FB  Neck ROM: full     Dental   No notable dental hx     Cardiovascular      Pulmonary      Other Findings        Anesthesia Plan  ASA Score- 3     Anesthesia Type- IV sedation with anesthesia with ASA Monitors.         Additional Monitors:     Airway Plan:            Plan Factors-Exercise tolerance (METS): >4 METS.    Chart reviewed.    Patient summary reviewed.    Patient is not a current smoker.              Induction- intravenous.    Postoperative Plan-         Informed Consent- Anesthetic plan and risks discussed with patient.  I personally reviewed this patient with the CRNA. Discussed and agreed on the Anesthesia Plan with the CRNA..      NPO Status:  Vitals Value Taken Time   Date of last liquid 03/18/25 03/18/25 1133   Time of last liquid 0800 03/18/25 1133   Date of last solid 03/16/25 03/18/25 1133   Time of last solid 2359 03/18/25 1133

## 2025-03-18 NOTE — ANESTHESIA POSTPROCEDURE EVALUATION
Post-Op Assessment Note    CV Status:  Stable  Pain Score: 0    Pain management: adequate       Mental Status:  Alert and awake   Hydration Status:  Euvolemic   PONV Controlled:  Controlled   Airway Patency:  Patent     Post Op Vitals Reviewed: Yes    No anethesia notable event occurred.    Staff: Anesthesiologist, CRNA           Last Filed PACU Vitals:  Vitals Value Taken Time   Temp 97 °F (36.1 °C) 03/18/25 1338   Pulse 79 03/18/25 1338   /78 03/18/25 1338   Resp 16 03/18/25 1338   SpO2 97 % 03/18/25 1338

## 2025-03-18 NOTE — H&P
"History and Physical -  [facility]  Juan Luis Muñoz 64 y.o. male MRN: [id number]    HPI: Juan Luis Muñoz is a 64 y.o. year old male who presents evaluation of abdominal bloating/dyspepsia symptoms and for history of diverticulitis.  Also has history of polyps.      Review of Systems    Historical Information   Past Medical History:   Diagnosis Date    Abnormal liver function test     Arthritis 2018    Cancer (HCC) 2007    Diverticulitis of colon 2015    DVT (deep venous thrombosis) (HCC)     left leg  2012 also has Factor V    Gouty arthritis     Hypertension 1990    Polyp of sigmoid colon      Past Surgical History:   Procedure Laterality Date    COLONOSCOPY      UMBILICAL HERNIA REPAIR      UPPER GASTROINTESTINAL ENDOSCOPY      US GUIDED PROSTATE BIOPSY  12/30/2024    WISDOM TOOTH EXTRACTION       Social History   Social History     Substance and Sexual Activity   Alcohol Use Yes    Alcohol/week: 2.0 standard drinks of alcohol    Types: 2 Cans of beer per week     Social History     Substance and Sexual Activity   Drug Use No     Social History     Tobacco Use   Smoking Status Never   Smokeless Tobacco Never     Family History   Problem Relation Age of Onset    Breast cancer Mother     Hypertension Mother     Lung cancer Mother     Cancer Mother     Coronary artery disease Father     Hypertension Father     Heart disease Father     Hypertension Brother     Leukemia Brother     Leukemia Sister     Hypertension Sister     Cancer Sister        Meds/Allergies     Not in a hospital admission.    No Known Allergies    Objective     /76   Pulse 72   Temp (!) 96.9 °F (36.1 °C) (Temporal)   Resp 18   Ht 5' 9\" (1.753 m)   Wt 84.8 kg (187 lb)   SpO2 99%   BMI 27.62 kg/m²       PHYSICAL EXAM    Gen: NAD  CV: RRR  CHEST: Clear  ABD: soft, NT/ND  EXT: no edema  Neuro: AAO      ASSESSMENT/PLAN:  This is a 64 y.o. year old male here for evaluation of dyspepsia symptoms, abdominal bloating, has history " of diverticulitis and colon polyps.  Patient was explained about the risks and benefits of the procedure. Risks including but not limited to bleeding, infection, perforation were explained in detail.     PLAN:   Procedure: EGD and colonoscopy.

## 2025-03-21 PROCEDURE — 88305 TISSUE EXAM BY PATHOLOGIST: CPT | Performed by: PATHOLOGY

## 2025-03-24 ENCOUNTER — RESULTS FOLLOW-UP (OUTPATIENT)
Age: 65
End: 2025-03-24

## 2025-03-24 NOTE — TELEPHONE ENCOUNTER
----- Message from Jas Singh MD sent at 3/24/2025  1:59 PM EDT -----  Recommend colonoscopy in 5 years.  Recommend EGD in 3 months.

## 2025-03-24 NOTE — TELEPHONE ENCOUNTER
Attempted to call pt regarding EGD & colonoscopy results and provider recommendations, however, I received voicemail. Advised to return call to office to discuss. 5 year colon recall set. 3 mo EGD recall set.     Please, relay results if pt returns call. Thank you!

## 2025-03-25 ENCOUNTER — PREP FOR PROCEDURE (OUTPATIENT)
Dept: GASTROENTEROLOGY | Facility: CLINIC | Age: 65
End: 2025-03-25

## 2025-03-25 DIAGNOSIS — K25.9 GASTRIC ULCER, UNSPECIFIED CHRONICITY, UNSPECIFIED WHETHER GASTRIC ULCER HEMORRHAGE OR PERFORATION PRESENT: Primary | ICD-10-CM

## 2025-05-06 ENCOUNTER — TELEPHONE (OUTPATIENT)
Age: 65
End: 2025-05-06

## 2025-05-06 NOTE — TELEPHONE ENCOUNTER
"Patient called today in attempt to reschedule 7/28 appt with Dr Brooks. The pt will be away.  Pt was offered the next avail in October and declined. Pt asked that he be contacted if sooner appt with Dr Brooks because avail. I offered the pt the Oct appt and to add him to the waitlist to which the patient responded \"If I have to wait until October, I will just find another provider.\"    Please review.    Call back [phone]  "

## 2025-05-06 NOTE — TELEPHONE ENCOUNTER
Wanted to loop you in on this as it was routed directly to us.     Pt of Todd. With checkout notes: return in 6 months with psa prior to visit, dr warner    Should we add pt to wait list in case appt cancels?  Do we offer NP spot, let pt find another provider?    Please advise, thanks!

## 2025-05-09 ENCOUNTER — TELEPHONE (OUTPATIENT)
Dept: GASTROENTEROLOGY | Facility: CLINIC | Age: 65
End: 2025-05-09

## 2025-05-09 NOTE — TELEPHONE ENCOUNTER
Our mutual patient is scheduled for procedure: EGD    On: June 23 , 2025     With: Dr. Jas Singh MD    He/She is taking the following blood thinner: Xarelto (Rivaroxaban)    Can this be stopped  2 days prior to the procedure    Physician Approving clearance: Dr. Melendez

## 2025-06-19 ENCOUNTER — OFFICE VISIT (OUTPATIENT)
Dept: FAMILY MEDICINE CLINIC | Facility: CLINIC | Age: 65
End: 2025-06-19
Payer: COMMERCIAL

## 2025-06-19 VITALS
WEIGHT: 199.5 LBS | DIASTOLIC BLOOD PRESSURE: 76 MMHG | RESPIRATION RATE: 16 BRPM | OXYGEN SATURATION: 97 % | HEART RATE: 63 BPM | SYSTOLIC BLOOD PRESSURE: 134 MMHG | BODY MASS INDEX: 29.46 KG/M2 | TEMPERATURE: 98.3 F

## 2025-06-19 DIAGNOSIS — I10 PRIMARY HYPERTENSION: ICD-10-CM

## 2025-06-19 DIAGNOSIS — K21.9 GASTROESOPHAGEAL REFLUX DISEASE WITHOUT ESOPHAGITIS: ICD-10-CM

## 2025-06-19 DIAGNOSIS — E78.1 HYPERTRIGLYCERIDEMIA: ICD-10-CM

## 2025-06-19 DIAGNOSIS — M1A.0710 CHRONIC IDIOPATHIC GOUT INVOLVING TOE OF RIGHT FOOT WITHOUT TOPHUS: ICD-10-CM

## 2025-06-19 DIAGNOSIS — K76.0 HEPATIC STEATOSIS: ICD-10-CM

## 2025-06-19 DIAGNOSIS — R97.20 ELEVATED PSA: ICD-10-CM

## 2025-06-19 DIAGNOSIS — Z00.00 ANNUAL PHYSICAL EXAM: Primary | ICD-10-CM

## 2025-06-19 DIAGNOSIS — D68.51 FACTOR V LEIDEN MUTATION (HCC): ICD-10-CM

## 2025-06-19 PROCEDURE — 99396 PREV VISIT EST AGE 40-64: CPT | Performed by: FAMILY MEDICINE

## 2025-06-19 NOTE — ASSESSMENT & PLAN NOTE
Previous diagnosis.  Right toe.  Had received steroid injections and then started on allopurinol.  Last uric acid was from 2017 3.3.  Unclear what initial uric acid was before starting allopurinol.  Could consider decreasing dose of allopurinol in the future.  Repeat uric acid level  Orders:    Uric acid; Future

## 2025-06-19 NOTE — ASSESSMENT & PLAN NOTE
He is due for repeat EGD.  Continue omeprazole 40 mg daily.  Denies any black stools or abdominal pain at this time.  Abdominal bloating has improved

## 2025-06-19 NOTE — PROGRESS NOTES
Adult Annual Physical  Name: Juan Luis Muñoz      : 1960      MRN: [id number]  Encounter Provider: Joshua Melendez MD  Encounter Date: 2025   Encounter department: [facility]    :  Assessment & Plan  Annual physical exam         Primary hypertension    Orders:    CBC and differential; Future    Comprehensive metabolic panel; Future    Elevated PSA    Orders:    PSA Total, Diagnostic; Future    Factor V Leiden mutation (HCC)         Chronic idiopathic gout involving toe of right foot without tophus  Previous diagnosis.  Right toe.  Had received steroid injections and then started on allopurinol.  Last uric acid was from 2017 3.3.  Unclear what initial uric acid was before starting allopurinol.  Could consider decreasing dose of allopurinol in the future.  Repeat uric acid level  Orders:    Uric acid; Future    Hypertriglyceridemia    Orders:    Lipid Panel with Direct LDL reflex; Future    Gastroesophageal reflux disease without esophagitis  He is due for repeat EGD.  Continue omeprazole 40 mg daily.  Denies any black stools or abdominal pain at this time.  Abdominal bloating has improved         Hepatic steatosis  Fib 4 score 1.65.  Patient did have imaging completed and is following gastroenterology.  Imaging did show cholelithiasis without acute cholecystitis.  Continue to monitor             Preventive Screenings:    - Prostate cancer screening: screening up-to-date     Immunizations:  - Immunizations due: Prevnar 20         History of Present Illness     Adult Annual Physical:  Patient presents for annual physical.     Diet and Physical Activity:  - Diet/Nutrition: portion control and adequate fiber intake.  - Exercise: moderate cardiovascular exercise, 1-2 times a week on average and 30-60 minutes on average.    Depression Screening:  - PHQ-2 Score: 0    General Health:  - Sleep: 7-8 hours of sleep on average.  - Hearing: decreased hearing right ear and decreased hearing left  Oncology/Hematology Visit Note  Oct 27, 2023    Reason for Visit: follow up on Avastin therapy and BP    History of Present Illness: Connor Emerson is a 47 year old male with Stage IV NSCLC, Rt lung adenocarcinoma with metastasis to pleura, mediastinum , rt pleural effusion and brain . He is currently undergoing treatment with brigatinib and bevacizumab. Please see previous note by Dr. Persaud for further details on the patient's history.     He comes in today for bevacizumab, C5    Interval History:  -Still having altered sleep.  Very tired after work, last night slept 4:30-10:30 pm and then was up all night.  Unable to fall back asleep.   This is causing altered medication timing and he's had a couple days of poor pain control as well.   - started the cymbalta, this has helped with the muscle/joint pain. He could tell a difference that his pain was less from this.  -Connor feels he is doing ok today. His BP readings 140-150/.  (They were in the 170/100 + range) he has been taking the propanolol 40mg BID, lisinopril 40 mg and hydrochlorothiazide to 50mg. No headaches.   -Had one brief episode of blood in urine 1 x with C3 and 1 x C4  -He follows with palliative for pain control.  Taking methadone 1 in the AM and 1 PM, oxycodone 2 at lunch and 2 bedtime.  He cut out the morning oxycodone to see if he would feel less tired.  He uses celebrex in the AM.   -No speech changes, vision changes, paresthesias, focal weakness, leg swelling, or activity concerning for seizures.  -No chest pain or SOB.   -No cough or fever.  -Having regular BM;s, last this AM. No abdominal pain.      Physical Examination:  BP (!) 145/97 (BP Location: Right arm, Patient Position: Sitting, Cuff Size: Adult Large)   Pulse 50   Temp 98.4  F (36.9  C) (Oral)   Resp 16   Wt 98.3 kg (216 lb 11.2 oz)   SpO2 99%   BMI 32.00 kg/m    Wt Readings from Last 4 Encounters:   10/27/23 98.3 kg (216 lb 11.2 oz)   10/23/23 97.5 kg (215 lb)   10/06/23  97.9 kg (215 lb 14.4 oz)   09/15/23 98.3 kg (216 lb 11.2 oz)         General: Well-appearing male in no acute distress.  Eyes: EOMI, PERRL. No scleral icterus or conjunctival injection.  ENT: Oral mucosa is moist without lesions or thrush.   Lymphatic: Neck is supple without cervical or supraclavicular lymphadenopathy.   Cardiovascular: RRR No murmurs. No peripheral edema.  Respiratory: CTA bilaterally. No wheezes or crackles.  Gastrointestinal: BS +. Abdomen rounded, soft, non-tender.   Neurologic: Cranial nerves II through XII are grossly intact.   Skin: Peeling to skin left hand without associated erythema. No rashes, petechiae, or bruising noted on exposed skin.   Psych: Alert. Pleasant. Comprehension intact.   Laboratory Data:  Most Recent 3 CBC's:  Recent Labs   Lab Test 10/27/23  1056 10/06/23  0907 09/15/23  1233   WBC 8.7 8.6 9.9   HGB 13.9 14.4 14.2   MCV 90 89 88    187 250   ANEUTAUTO 4.7 4.2 5.0    Most Recent 3 BMP's:  Recent Labs   Lab Test 10/27/23  1056 10/06/23  0907 09/15/23  1233    143 144   POTASSIUM 3.5 3.5 3.8   CHLORIDE 104 104 102   CO2 30* 29 31*   BUN 13.1 22.3* 21.3*   CR 0.82 0.78 0.78   ANIONGAP 10 10 11   TORY 9.3 9.5 9.9   * 138* 124*   PROTTOTAL 6.9 6.8 6.9   ALBUMIN 4.4 4.5 4.5    Most Recent 2 LFT's:  Recent Labs   Lab Test 10/27/23  1056 10/06/23  0907   AST 25 16   ALT 14 11   ALKPHOS 72 72   BILITOTAL 0.5 0.5    Most Recent TSH and T4:  Recent Labs   Lab Test 09/12/22  1642   TSH 2.56   Reviewed lipase, amylase, CK, and magnesium. Lipase elevated slightly.   I reviewed the above labs today.    Imaging:  MR Brain w/o & w Contrast  Narrative: MRI BRAIN WITHOUT AND WITH CONTRAST  10/25/2023 1:10 PM     HISTORY:  Hx of brain mets and radiation necrosis; Headache;  Neurologic deficit, non-traumatic; Language deficit; Neurologic  deficit onset <= 24 hours; No known/automatically detected potential  contraindications to iodinated contrast; Malignant neoplasm of  ear.  - Vision: most recent eye exam > 1 year ago and wears glasses. Need readers  - Dental: regular dental visits and brushes teeth twice daily. occ flossing     Health:    - Urinary symptoms: none.     Advanced Care Planning:  - Has an advanced directive?: no    - Has a durable medical POA?: no      Review of Systems   Constitutional:  Negative for activity change, fatigue and fever.   Eyes:  Negative for visual disturbance.   Respiratory:  Negative for shortness of breath.    Cardiovascular:  Negative for chest pain.   Gastrointestinal:  Negative for abdominal pain, constipation, diarrhea and nausea.        Reflux     Endocrine: Negative for cold intolerance and heat intolerance.   Musculoskeletal:  Negative for back pain.   Skin:  Negative for rash.   Neurological:  Negative for headaches.   Psychiatric/Behavioral:  Negative for confusion.          Objective   /76 (BP Location: Left arm, Patient Position: Sitting, Cuff Size: Large)   Pulse 63   Temp 98.3 °F (36.8 °C) (Temporal)   Resp 16   Wt 90.5 kg (199 lb 8 oz)   SpO2 97%   BMI 29.46 kg/m²     Physical Exam  Vitals and nursing note reviewed.   Constitutional:       Appearance: Normal appearance. He is well-developed.   HENT:      Head: Normocephalic and atraumatic.     Cardiovascular:      Rate and Rhythm: Normal rate and regular rhythm.   Pulmonary:      Effort: Pulmonary effort is normal.      Breath sounds: Normal breath sounds.   Abdominal:      General: Bowel sounds are normal.      Palpations: Abdomen is soft.     Musculoskeletal:      Cervical back: Normal range of motion.     Skin:     General: Skin is warm.     Neurological:      General: No focal deficit present.      Mental Status: He is alert.     Psychiatric:         Mood and Affect: Mood normal.         Speech: Speech normal.          lower  lobe of right lung (H); Radiation therapy induced brain necrosis;  Radiation therapy induced brain necrosis     TECHNIQUE:  Multiplanar, multisequence MRI of the brain without and  with 15 mL Gadavist, including whole brain perfusion imaging.     COMPARISON: Multiple previous brain MRIs, most recent 7/27/2023.     FINDINGS: Again seen are postoperative changes of right frontal  craniotomy. A resection cavity in the right frontal lobe is again  noted. The cystic resection cavity appears smaller compared to the  prior exam. Surrounding the right frontal lobe resection cavity along  its posterior medial and inferior margins, there is T2 FLAIR  hyperintense, heterogeneously enhancing nodular soft tissue, abutting  the frontal horn of the right lateral ventricle. Compared to the most  recent study, this nodular enhancing tissue along the posterior margin  of the resection cavity appears mildly increased size/radial  thickness. The tissue now shows more discrete T2 FLAIR hyperintense  signal (series 4 image 16), new intrinsic T1 hyperintense signal, and  restricted diffusion (series 2 image 55). It measures up to  approximately 12-13 mm in radial thickness. There is no evidence for  increased cerebral blood volume in this location on the perfusion  imaging.    Again seen are changes of left anterior parietal craniotomy. Unchanged  irregular parenchymal enhancement in the left frontal lobe underlying  the resection cavity, favored to reflect posttreatment changes (series  14 image 132).    Multiple additional small scattered supratentorial and infratentorial  metastatic lesions are similar to slightly decreased in size overall.  Ring-enhancing lesion in the left cerebellar hemisphere with central  DWI hyperintense signal it measures 12 mm x 8 mm compared to 13 mm x  10 mm previously. A couple of adjacent left cerebellar enhancing  lesions are smaller/less conspicuous as well. Heterogeneously  enhancing nodular  lesion in the inferolateral right temporal lobe is  decreased in size and conspicuity (series 14 image 52) measuring 10 mm  x 9 mm the axial plane compared to 14 mm x 9 mm previously. Enhancing  cortical/subcortical nodule in the left parietal lobe (series 14 image  109) measuring approximately 4-5 mm appears slightly increased in  conspicuity compared to most recent study, but relatively similar to  exam of 6/28/2023. Enhancing lesion in the right parietal lobe  measuring 2-3 mm (series 14 image 133) appears relatively similar to  exam of 6/28/2023, but mildly increased in conspicuity compared to  7/27/2023. A previously seen left frontal white matter metastasis  evident on MRI of 6/24/2023 is not well seen on the current study;  this is similar to the most recent study.    Vasogenic edema is seen in the right frontal lobe on the previous exam  has significantly decreased. The associated mass effect is decreased.  There is no longer any midline shift. There is mild residual vasogenic  edema/T2 FLAIR hyperintense signal in the right frontal lobe without  significant mass effect. No hydrocephalus. Multiple small scattered  foci of hemosiderin staining involving previously demonstrated  metastatic lesions and the margins of the right frontal resection  cavity appear unchanged. No acute intracranial hemorrhage or  extra-axial fluid collection. Mild regional dural thickening in the  region of the right sided craniotomy flap is likely reactive,  unchanged.    Orbits appear unremarkable. The paranasal sinuses are free of  significant disease. The not operative calvarium, skull base, and  midface otherwise appear grossly unremarkable.  Impression: IMPRESSION:  1. Redemonstrated postoperative changes of right frontal craniotomy.  Decreased size of the right frontal resection cavity with significant  decrease in the surrounding right frontal lobe vasogenic edema seen on  the previous study. Mass effect has essentially  resolved in the  interim and there is no longer any midline shift.  2. In the interim, slightly increased thickness of a rind of  peripheral nodular enhancement along the posterior inferior and medial  aspects of the right frontal lobe resection cavity, indeterminate.  There is no significant elevated cerebral blood volume in this area.  The findings may represent evolving posttreatment changes; however,  residual tumor is not excluded. Recommend close interval follow-up.  3. Stable postoperative changes status post left anterior parietal  craniotomy with irregular enhancement in the left frontal lobe  parenchyma underlying the resection cavity, likely due to  posttreatment change.  4. Other scattered supratentorial and infratentorial metastatic  lesions are overall similar to slightly decreased in size, as  described.  5. No acute intracranial process.    JM SHI MD         SYSTEM ID:  [id number]    I reviewed the above imaging today.    Assessment and Plan:    Stage IV NSCLC, Rt lung adenocarcinoma with metastasis to pleura, mediastinum , rt pleural effusion and brain   -brigatinib 2/22/23, multiple different doses, currently at 120 mg, most recent reduction (July of 2023) due to myalgias.   -lipase 283 today, grade 3 elevation. No symptoms of abdominal pain, N/V.  Discussed HOLD x 1 week and recheck labs next week. Discussed with DR Persaud  -recent imaging in September stable, next images in December  - urine protein elevated.  Will hold off on 24 hour urine unless >100.  He only has 1 more dose left    NEURO  # Brain mets: reviewed brain MRI today, stable  # Radiation necrosis  -Baseline Brain MRI with several brain mets, s/p GK to 11-12 brain mets. F/u Brain MRI in June 2022 was showing enlargement of the one of the lesions along with edema, therefore had to undergo craniotomy followed by resection, the final biopsy consistent with radiation necrosis.   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    (Of note, Connor S/p 2 doses of Bevacixumab fall of 2022, stopped due HTN urgency and PE.) MRI in 4/2023 now showing contrast enhancement of lesions and a dominate lesion in the Rfrontal region with edema, several other smaller lesion. Short term MRI showing increase in size of the rt frontal lesion, underwent resection, patho again showing radiation necrosis.  Reviewed this is improved on October imaging  -BP has been more consistent in the 140/90 range.  Will ask him to continue to monitor 1-2 x daily at home.    - continue propanolol to 40 mg TID, 50 mg of hydrochlorothiazide and lisinopril 40 mg daily.  If needed could consider CCB or hydralazine.   -Check BP twice daily. Educated on risk of stroke due to uncontrolled BP and warning signs of this that warrant immediate care.   # Headache  -Present since recent craniotomy on 6/27/23. MRI brain on 7/27/23 with reduced vasogenic edema. Small dose of Dex + taper in August, these have resolved.  No further headaches      Elevated Lipase  -Elevated at 283. Grade 3, as noted above, will hold until a grade 1, then resume.    Diabetes, Type 2  Reports improved control. Currently running about 150-170 at home  --on Metformin and insulin    PE: provoked by malignancy vs bevacizumab in 11/2022  -- on rivaroxiabn 20 mg daily  -- of note, Connor not taking this.  From notes I could find he stopped (?unsure why) in April.  Given hypercoagulability of cancer and h/o of clotting on Avastin, will restart.    Grade 2-3 arthralgias  All joints affected, no morning stiffness, normal ESR and CRP  Recently has noticed more fatigue and stifnnes in body.  2/2 to brigatinib and possible worsening with Avastin.   -following with palliative;   -on methadone BID;Celecoxib 100 mg po BID  -I switched him from Celexa to Cymbalta.  This has helped his arthralgias.  He is taking 30 mg once daily, encouraged him to increase to BID, given his tolerability and that its  helping.  He was able to drop his oxycodone to twice a day (vs TID)    Hematuria: once and cleared.  Will get a UA/UC    50 minutes spent on the date of the encounter doing chart review, review of test results, interpretation of tests, patient visit, and documentation  and discussion of plan with Dr Cori Navarro PA-C  [facility] [address] [id number] [phone]     AMEND: UA + for UTI, strep, ceftinir sent to local pharm. Mychart sent to patient's wife.  LGR

## 2025-06-19 NOTE — ASSESSMENT & PLAN NOTE
Current Eye Medications: no      Subjective:  Comprehensive/diabetic eye exam.   Pt reports that she is seeing well. No eye pain or discomfort.    Lab Results   Component Value Date    A1C 6.8 11/01/2021    A1C 7.1 04/29/2019    A1C 7.7 10/01/2018    A1C 6.9 04/18/2018    A1C 7.6 11/28/2016    A1C 7.6 07/11/2016      Objective:  See Ophthalmology Exam.      Assessment:  Pam Hartman is a 86 year old female who presents with:   Encounter Diagnoses   Name Primary?     Examination of eyes and vision      Type 2 diabetes mellitus without complication, without long-term current use of insulin (H) Negative diabetic retinopathy      Pseudophakia - Both Eyes s/p YAG cap OU      Posterior vitreous detachment of right eye      Myopia of both eyes with regular astigmatism and presbyopia      Plan:  Glasses prescription given - optional to update    Keep blood sugars and blood pressure under good control.    Mariann Willingham MD  [phone]                   Fib 4 score 1.65.  Patient did have imaging completed and is following gastroenterology.  Imaging did show cholelithiasis without acute cholecystitis.  Continue to monitor

## 2025-06-22 LAB
ALBUMIN SERPL-MCNC: 4.4 G/DL (ref 3.6–5.1)
ALBUMIN/GLOB SERPL: 2.2 (CALC) (ref 1–2.5)
ALP SERPL-CCNC: 66 U/L (ref 35–144)
ALT SERPL-CCNC: 22 U/L (ref 9–46)
AST SERPL-CCNC: 24 U/L (ref 10–35)
BASOPHILS # BLD AUTO: 63 CELLS/UL (ref 0–200)
BASOPHILS NFR BLD AUTO: 1.1 %
BILIRUB SERPL-MCNC: 0.9 MG/DL (ref 0.2–1.2)
BUN SERPL-MCNC: 11 MG/DL (ref 7–25)
BUN/CREAT SERPL: NORMAL (CALC) (ref 6–22)
CALCIUM SERPL-MCNC: 9.4 MG/DL (ref 8.6–10.3)
CHLORIDE SERPL-SCNC: 105 MMOL/L (ref 98–110)
CHOLEST SERPL-MCNC: 166 MG/DL
CHOLEST/HDLC SERPL: 2.7 (CALC)
CO2 SERPL-SCNC: 30 MMOL/L (ref 20–32)
CREAT SERPL-MCNC: 1.02 MG/DL (ref 0.7–1.35)
EOSINOPHIL # BLD AUTO: 222 CELLS/UL (ref 15–500)
EOSINOPHIL NFR BLD AUTO: 3.9 %
ERYTHROCYTE [DISTWIDTH] IN BLOOD BY AUTOMATED COUNT: 13.7 % (ref 11–15)
GFR/BSA.PRED SERPLBLD CYS-BASED-ARV: 83 ML/MIN/1.73M2
GLOBULIN SER CALC-MCNC: 2 G/DL (CALC) (ref 1.9–3.7)
GLUCOSE SERPL-MCNC: 99 MG/DL (ref 65–99)
HCT VFR BLD AUTO: 52.2 % (ref 38.5–50)
HDLC SERPL-MCNC: 61 MG/DL
HGB BLD-MCNC: 17.7 G/DL (ref 13.2–17.1)
LDLC SERPL CALC-MCNC: 88 MG/DL (CALC)
LYMPHOCYTES # BLD AUTO: 2115 CELLS/UL (ref 850–3900)
LYMPHOCYTES NFR BLD AUTO: 37.1 %
MCH RBC QN AUTO: 33.5 PG (ref 27–33)
MCHC RBC AUTO-ENTMCNC: 33.9 G/DL (ref 32–36)
MCV RBC AUTO: 98.9 FL (ref 80–100)
MONOCYTES # BLD AUTO: 450 CELLS/UL (ref 200–950)
MONOCYTES NFR BLD AUTO: 7.9 %
NEUTROPHILS # BLD AUTO: 2850 CELLS/UL (ref 1500–7800)
NEUTROPHILS NFR BLD AUTO: 50 %
NONHDLC SERPL-MCNC: 105 MG/DL (CALC)
PLATELET # BLD AUTO: 114 THOUSAND/UL (ref 140–400)
PMV BLD REES-ECKER: 10.2 FL (ref 7.5–12.5)
POTASSIUM SERPL-SCNC: 4.3 MMOL/L (ref 3.5–5.3)
PROT SERPL-MCNC: 6.4 G/DL (ref 6.1–8.1)
PSA SERPL-MCNC: 5.1 NG/ML
RBC # BLD AUTO: 5.28 MILLION/UL (ref 4.2–5.8)
SERVICE CMNT-IMP: ABNORMAL
SL AMB PLATELET ESTIMATION: ABNORMAL
SODIUM SERPL-SCNC: 139 MMOL/L (ref 135–146)
TRIGL SERPL-MCNC: 80 MG/DL
URATE SERPL-MCNC: 3.9 MG/DL (ref 4–8)
WBC # BLD AUTO: 5.7 THOUSAND/UL (ref 3.8–10.8)

## 2025-07-07 NOTE — PATIENT INSTRUCTIONS
PROSTATE CANCER SCREENING OVERVIEW    Prostate cancer screening involves testing for prostate cancer in men who have no symptoms of the disease. This testing can find cancer at an early stage. However, medical experts agree that prostate cancer screening should not be routinely ordered for all men and that screening can lead to both benefits and harms.    This article is designed to review the advantages and disadvantages of prostate cancer screening. You should talk with your health care provider to decide what is best in your individual situation.    WHAT IS PROSTATE CANCER?  Prostate cancer is a cancer of the prostate, a small gland in men that is located below the bladder and in front of the rectum (figure 1). The prostate produces fluid that helps carry sperm during ejaculation.  Although many men are diagnosed with prostate cancer, most of them do not die from their cancer. Prostate cancer often grows so slowly that many men die of other causes before they even develop symptoms of prostate cancer.    PROSTATE CANCER RISK FACTORS  Age -- All men are at risk for prostate cancer, but the risk greatly increases with older age. Prostate cancer is rarely found in men younger than 50 years old.    Ethnic background --  men develop prostate cancer more often than white and  men.  men also are more likely to die of prostate cancer than white or  men.    Family medical history -- Men who have a first-degree relative (a father or brother) with prostate cancer are more likely to develop the disease. Men with female relatives with breast cancer related to the breast cancer gene (BRCA) may also be more likely to develop prostate cancer.    Diet -- A diet high in animal fat or low in vegetables may increase a man's risk of prostate cancer.    PROSTATE CANCER SCREENING TESTS  Prostate cancer screening involves blood test that measures prostate-specific antigen  "(PSA).  Prostate-specific antigen (PSA) -- PSA is a protein produced by the prostate. The PSA test measures the amount of PSA in a sample of blood. Although many men with prostate cancer have an elevated PSA concentration, a high level does not necessarily mean there is a cancer.  The most common cause for an elevated PSA is benign prostatic hyperplasia (BPH), a noncancerous enlargement of the prostate. Other causes include prostate infection (prostatitis), trauma (bicycle riding), and sexual activity. You should avoid ejaculating or riding a bike for at least 48 hours before having a PSA test. (See \"Patient education: Benign prostatic hyperplasia (BPH) (Beyond the Basics)\".)    Rectal examination -- A rectal examination is sometimes recommended, along with measurement of the PSA, to screen for prostate cancer. However, studies have not shown that rectal examination is an effective screening test for prostate cancer when used alone.    If the PSA test is positive -- A positive PSA test is not a reason to panic; noncancerous conditions are the most common causes for an abnormal test, particularly for PSA tests. On the other hand, a positive test should not be ignored.    The first step in evaluating an elevated PSA is usually to repeat the test.  You should avoid ejaculating and riding a bike for at least 48 hours before repeating the test. If the PSA remains elevated, a prostate biopsy or other testing is usually recommended.    Prostate biopsy -- A prostate biopsy involves having a rectal ultrasound and use of a needle to obtain tissue samples from the prostate gland. The biopsy is usually performed in the office by a urologist (a doctor who specializes in treatment of urinary, bladder, and prostate issues). After the procedure, most men feel sore and you may see some blood in the urine or semen, this can last for up to 4-6 weeks. Biopsies can rarely cause serious infections. Sometimes biopsies are guided by " "magnetic resonance imaging (MRI).    PROS AND CONS OF PROSTATE CANCER SCREENING    There are a number of arguments for and against prostate cancer screening.    Arguments for screening -- Experts in favor of prostate cancer screening cite the following arguments:    ?Results from a large  study of prostate cancer screening found that men who had prostate-specific antigen (PSA) testing had a 20 percent lower chance of dying from prostate cancer after 13 years compared with men who did not have prostate cancer screening [1]. Men who had PSA testing also had a 30 percent lower chance of developing metastatic disease (cancer that has spread to other parts of the body) [2].  In another  study of prostate cancer screening, the mortality benefit was even larger to prostate cancer screening  (the [place] trial)    ?A substantial number of men die from prostate cancer every year and many more suffer from the complications of advanced disease.    ?For men with an aggressive prostate cancer, the best chance for curing it is by finding it at an early stage and then treating it with surgery or radiation. Studies have shown that men who have prostate cancer detected by PSA screening tend to have earlier-stage cancer than men who have a cancer detected by other means. (See \"Patient education: Treatment for advanced prostate cancer (Beyond the Basics)\" and \"Patient education: Prostate cancer treatment; stage I to III cancer (Beyond the Basics)\".)    ?The five-year survival for men who have prostate cancer confined to the prostate gland (early stage) is nearly 100 percent; this drops to 30 percent for men whose cancer has spread to other areas of the body. However, many early-stage cancers are not aggressive, and the five-year survival for those will be nearly 100 percent even without any treatment [3].    ?The available screening tests are not perfect, but they are easy to perform and have fair accuracy.  Arguments " against screening -- Other arguments have also been made against screening:    ?Even though the  study found a benefit of prostate cancer screening, PSA testing prevented only about one prostate cancer death for every 1000 screened men after 13 years [1]. Furthermore, 75 percent of men with an abnormal PSA who had a prostate biopsy did not have prostate cancer.  However, in the [facility] study, the number needed to screen and treat was much lower indicating that prostate cancer screening is ineffective screening measure which decreases the morbidity and mortality of prostate cancer.    ?Many prostate cancers detected with screening are unlikely to cause death or disability. Thus, a number of men will be diagnosed with cancer and potentially suffer the side effects of cancer treatment for cancers that never would have been found without prostate cancer screening. In other words, even if screening finds a cancer early, it is not clear in all cases that the cancer must be treated.    IS PROSTATE CANCER SCREENING RIGHT FOR ME?    The answer to this question is not the same for everyone. The table includes some questions you can ask yourself when weighing the potential risk and benefits of screening (table 1).  Professional organizations -- Major medical associations and societies, including the [place] Preventive Services Task Force [4], American Cancer Society [5], American Urological Association [6], and many  cancer societies, agree that men should discuss screening with their health care providers. Men should be informed about the benefits and risks of prostate cancer screening and treatment and make decisions that best reflect their personal values and preferences.  Age to first consider screening -- Screening discussions should begin at age 50 years for men at average risk for developing prostate cancer. Men with risk factors for prostate cancer (such as black men or men with a father or brother who had  prostate cancer) may want to begin screening discussions at age 40 to 45 years.  How often to perform screening -- Once screening begins, it should occur every two to four years (if continued testing is desired) and should include a PSA blood test.  Age to stop screening -- Guidelines suggest stopping screening after age 69, though some experts would continue offering screening to very healthy men beyond that age.  Screening not recommended -- Screening is generally not recommended for men whose life expectancy, or ability to undergo curative treatment, is limited by serious health problems. In these situations, the potential benefits of screening are outweighed by the likely harms.  WHERE TO GET MORE INFORMATION  Your healthcare provider is the best source of information for questions and concerns related to your medical problem.  This article will be updated as needed on our web site ([email or web address]). Related topics for patients, as well as selected articles written for healthcare professionals, are also available. Some of the most relevant are listed below.  Patient level information -- [employer] offers two types of patient education materials.  The Basics -- The Basics patient education pieces answer the four or five key questions a patient might have about a given condition. These articles are best for patients who want a general overview and who prefer short, easy-to-read materials.  Patient education: Prostate cancer screening (PSA tests) (The Basics)  Patient education: Prostate cancer (The Basics)  Patient education: Cancer screening (The Basics)  Patient education: Choosing treatment for low-risk localized prostate cancer (The Basics)  Beyond the Basics -- Beyond the Basics patient education pieces are longer, more sophisticated, and more detailed. These articles are best for patients who want in-depth information and are comfortable with some medical jargon.  Patient education: Treatment for  advanced prostate cancer (Beyond the Basics)  Patient education: Prostate cancer treatment; stage I to III cancer (Beyond the Basics)  Patient education: Benign prostatic hyperplasia (BPH) (Beyond the Basics)  Professional level information -- Professional level articles are designed to keep doctors and other health professionals up-to-date on the latest medical findings. These articles are thorough, long, and complex, and they contain multiple references to the research on which they are based. Professional level articles are best for people who are comfortable with a lot of medical terminology and who want to read the same materials their doctors are reading.  Measurement of prostate-specific antigen  Screening for prostate cancer  The following organizations also provide reliable health information.  ?[employer] [place]  0-983-5-CANCER  (www.cancer.gov/types/prostate)  ?American Society for Clinical Oncology (patient information website)  (www.cancer.net)  ?National Comprehensive Cancer Network (patient and caregiver information website)  (www.nccn.org/patients)  ?American Cancer Society  7-090-UPU-2345  (www.cancer.org)  ?National Library of Medicine  (www.medlineplus.gov/healthtopics.html)  ?[employer] Prostate Cancer Education and Support  (www.ustoo.org/Detection-PSA-And-MIGUEL ANGEL)

## 2025-07-07 NOTE — ASSESSMENT & PLAN NOTE
Prostate biopsy 12/2024 - right apex, prostate tissue with small atypical glands, suspicious for well differentiated adenocarcinoma (not meeting criteria for a mike score)  PSA 5.1, down trending  Following with active surveillance  Due for MRI prostate. Pending results will plan for PSA surveillance given decreasing PSA versus MRI targeted prostate biopsy    Orders:    Basic metabolic panel; Future    MRI prostate multiparametric wo w contrast; Future    PSA Total, Diagnostic; Future

## 2025-07-07 NOTE — PROGRESS NOTES
"Name: Juan Luis Muñoz      : 1960      MRN: [id number]  Encounter Provider: Feroz Brooks MD  Encounter Date: 2025   Encounter department: [facility]  :  Assessment & Plan  Elevated PSA  Prostate biopsy 2024 - right apex, prostate tissue with small atypical glands, suspicious for well differentiated adenocarcinoma (not meeting criteria for a mike score)  PSA 5.1, down trending  Following with active surveillance  Due for MRI prostate. Pending results will plan for PSA surveillance given decreasing PSA versus MRI targeted prostate biopsy    Orders:    Basic metabolic panel; Future    MRI prostate multiparametric wo w contrast; Future    PSA Total, Diagnostic; Future        History of Present Illness   Juan Luis Muñoz is a 64 y.o. male who presents for follow up of elevated psa and abnormal prostate biopsy results    PSA 5.10 from previously being 7.9    The following portions of the patient's history were reviewed and updated as appropriate: allergies, current medications, past family history, past medical history, past social history, past surgical history and problem list.      Review of Systems   Constitutional: Negative.    HENT: Negative.     Eyes: Negative.    Respiratory: Negative.     Cardiovascular: Negative.    Gastrointestinal: Negative.    Endocrine: Negative.    Genitourinary: Negative.    Musculoskeletal: Negative.    Skin: Negative.    Allergic/Immunologic: Negative.    Neurological: Negative.    Hematological: Negative.    Psychiatric/Behavioral: Negative.            Objective   /80 (BP Location: Left arm, Patient Position: Sitting, Cuff Size: Standard)   Pulse 59   Ht 5' 9\" (1.753 m)   Wt 89.8 kg (198 lb)   SpO2 97%   BMI 29.24 kg/m²     Physical Exam  Vitals reviewed.   Constitutional:       General: He is not in acute distress.     Appearance: Normal appearance. He is well-developed. He is not ill-appearing, toxic-appearing or diaphoretic. "   HENT:      Head: Normocephalic and atraumatic.      Nose: Nose normal.      Mouth/Throat:      Mouth: Mucous membranes are moist.     Eyes:      General: No scleral icterus.        Right eye: No discharge.         Left eye: No discharge.     Neck:      Thyroid: No thyromegaly.      Trachea: No tracheal deviation.   Pulmonary:      Effort: Pulmonary effort is normal.   Chest:      Chest wall: No tenderness.   Abdominal:      General: There is no distension.      Palpations: There is no mass.      Tenderness: There is no abdominal tenderness. There is no guarding.      Hernia: No hernia is present.   Genitourinary:     Comments: Prostate smooth, no nodules    Musculoskeletal:         General: No deformity or signs of injury. Normal range of motion.      Cervical back: Normal range of motion and neck supple.   Lymphadenopathy:      Cervical: No cervical adenopathy.     Skin:     General: Skin is dry.      Coloration: Skin is not jaundiced or pale.      Findings: No erythema.     Neurological:      Mental Status: He is alert and oriented to person, place, and time.      Cranial Nerves: No cranial nerve deficit.      Motor: No abnormal muscle tone.      Coordination: Coordination normal.     Psychiatric:         Mood and Affect: Mood normal.         Behavior: Behavior normal.         Thought Content: Thought content normal.         Judgment: Judgment normal.           Results   Lab Results   Component Value Date    PSA 5.10 (H) 06/20/2025    PSA 7.9 (H) 08/02/2024    PSA 8.42 (H) 07/05/2024     Lab Results   Component Value Date    GLUCOSE 93 11/01/2015    CALCIUM 9.4 06/20/2025     12/01/2017    K 4.3 06/20/2025    CO2 30 06/20/2025     06/20/2025    BUN 11 06/20/2025    CREATININE 1.02 06/20/2025     Lab Results   Component Value Date    WBC 5.7 06/20/2025    HGB 17.7 (H) 06/20/2025    HCT 52.2 (H) 06/20/2025    MCV 98.9 06/20/2025     (L) 06/20/2025       Office Urine Dip  No results found for  this or any previous visit (from the past hour).

## 2025-07-09 ENCOUNTER — OFFICE VISIT (OUTPATIENT)
Dept: UROLOGY | Facility: CLINIC | Age: 65
End: 2025-07-09
Payer: COMMERCIAL

## 2025-07-09 VITALS
DIASTOLIC BLOOD PRESSURE: 80 MMHG | SYSTOLIC BLOOD PRESSURE: 132 MMHG | HEART RATE: 59 BPM | WEIGHT: 198 LBS | OXYGEN SATURATION: 97 % | HEIGHT: 69 IN | BODY MASS INDEX: 29.33 KG/M2

## 2025-07-09 DIAGNOSIS — R97.20 ELEVATED PSA: Primary | ICD-10-CM

## 2025-07-09 PROCEDURE — 99213 OFFICE O/P EST LOW 20 MIN: CPT | Performed by: UROLOGY

## 2025-07-16 ENCOUNTER — TELEPHONE (OUTPATIENT)
Age: 65
End: 2025-07-16

## 2025-07-16 NOTE — LETTER
July 16, 2025    Juan Luis Muñoz  [address] [id number]      Dear Mr. Muñoz:    Attached are your prep instructions for your upcoming procedure on 8/11/25. Please remember to hold your Xarelto for 2 days prior to your procedure.  If you have any questions or concerns please contact us at [phone].    Thank you,     [facility]

## 2025-07-16 NOTE — TELEPHONE ENCOUNTER
Scheduled date of EGD(as of today): 8/11/25  Physician performing EGD:   [facility]: [facility]  Instructions reviewed with patient by: md -sent via [employer]  Clearances:  2 day Xarelto hold - patient aware.

## 2025-07-16 NOTE — TELEPHONE ENCOUNTER
Scheduled date of EGD(as of today): 9-  Physician performing EGD: Dr. Singh  Location of EGD: [facility]   Instructions reviewed with patient by: ELBA devlin via [employer]   Clearances: 2 day xarelto hold

## 2025-07-29 ENCOUNTER — TELEPHONE (OUTPATIENT)
Dept: OTHER | Facility: OTHER | Age: 65
End: 2025-07-29

## 2025-08-01 ENCOUNTER — HOSPITAL ENCOUNTER (OUTPATIENT)
Dept: RADIOLOGY | Age: 65
Discharge: HOME | End: 2025-08-01
Attending: UROLOGY
Payer: COMMERCIAL

## 2025-08-01 DIAGNOSIS — R97.20 ELEVATED PSA: ICD-10-CM

## 2025-08-01 PROCEDURE — A9585 GADOBUTROL INJECTION: HCPCS | Performed by: UROLOGY

## 2025-08-01 PROCEDURE — 72197 MRI PELVIS W/O & W/DYE: CPT

## 2025-08-01 PROCEDURE — 76377 3D RENDER W/INTRP POSTPROCES: CPT

## 2025-08-01 RX ORDER — GADOBUTROL 604.72 MG/ML
9 INJECTION INTRAVENOUS
Status: COMPLETED | OUTPATIENT
Start: 2025-08-01 | End: 2025-08-01

## 2025-08-01 RX ADMIN — GADOBUTROL 9 ML: 604.72 INJECTION INTRAVENOUS at 08:46

## 2025-08-12 ENCOUNTER — TELEPHONE (OUTPATIENT)
Age: 65
End: 2025-08-12

## 2025-08-12 ENCOUNTER — TELEPHONE (OUTPATIENT)
Dept: UROLOGY | Facility: CLINIC | Age: 65
End: 2025-08-12

## 2025-08-13 ENCOUNTER — RESULTS FOLLOW-UP (OUTPATIENT)
Dept: OTHER | Facility: HOSPITAL | Age: 65
End: 2025-08-13

## 2025-08-15 ENCOUNTER — PREP FOR PROCEDURE (OUTPATIENT)
Dept: UROLOGY | Facility: MEDICAL CENTER | Age: 65
End: 2025-08-15